# Patient Record
Sex: MALE | Race: BLACK OR AFRICAN AMERICAN | NOT HISPANIC OR LATINO | Employment: FULL TIME | URBAN - METROPOLITAN AREA
[De-identification: names, ages, dates, MRNs, and addresses within clinical notes are randomized per-mention and may not be internally consistent; named-entity substitution may affect disease eponyms.]

---

## 2023-04-27 ENCOUNTER — OFFICE VISIT (OUTPATIENT)
Dept: FAMILY MEDICINE CLINIC | Facility: CLINIC | Age: 38
End: 2023-04-27
Payer: MEDICAID

## 2023-04-27 VITALS
SYSTOLIC BLOOD PRESSURE: 176 MMHG | HEART RATE: 98 BPM | BODY MASS INDEX: 33.04 KG/M2 | HEIGHT: 68 IN | TEMPERATURE: 97.7 F | WEIGHT: 218 LBS | DIASTOLIC BLOOD PRESSURE: 102 MMHG | OXYGEN SATURATION: 98 %

## 2023-04-27 DIAGNOSIS — Z00.00 ROUTINE GENERAL MEDICAL EXAMINATION AT A HEALTH CARE FACILITY: Primary | ICD-10-CM

## 2023-04-27 DIAGNOSIS — R06.83 SNORING: ICD-10-CM

## 2023-04-27 DIAGNOSIS — R03.0 ELEVATED BLOOD PRESSURE READING IN OFFICE WITHOUT DIAGNOSIS OF HYPERTENSION: ICD-10-CM

## 2023-04-27 DIAGNOSIS — R94.31 ABNORMAL EKG: ICD-10-CM

## 2023-04-27 RX ORDER — DIPHENOXYLATE HYDROCHLORIDE AND ATROPINE SULFATE 2.5; .025 MG/1; MG/1
TABLET ORAL DAILY
COMMUNITY

## 2023-04-27 RX ORDER — HYDROCHLOROTHIAZIDE 25 MG/1
25 TABLET ORAL DAILY
Qty: 30 TABLET | Refills: 1 | Status: SHIPPED | OUTPATIENT
Start: 2023-04-27

## 2023-04-27 NOTE — PROGRESS NOTES
"Chief Complaint  Establish Care (New pt, Physical )    Subjective        John Cooper presents to [facility]  History of Present Illness patient here to establish care.  He has not had a PCP recently goes to health clinics whenever he has time or needed.  Considers himself overall healthy.    He takes no medicine.  Prefers naturopathic over prescription medications.    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  This is a very busy time of year for him.  Has current female partner.  He also has 5 daughters aging and range from 2-14.  His partner has 1 dog.    Patient has significant allergies especially this time of year.  Does not like to take allergy medicine.  He has had a weird appetite recently.  Some days hungry some days not so hungry.  He has no nausea, vomiting, belly pain.  No reflux.  He denies headaches, dizziness.  He has no chest pain, palpitation, dyspnea, cough.  No melena or hematuria.  No urinary symptoms.  No myalgia, arthralgia.  No weakness.    He does have a family history of hypertension and reports \"lots of cancers\".  Grandmother  of colon cancer, dad with polyps.  No known family history of prostate cancer.  He reports his brother  of bone cancer.    Patient denies recreational drugs or tobacco.  He is a social drinker, not frequently.        Objective   Vital Signs:  BP (!) 176/102   Pulse 98   Temp 97.7 °F (36.5 °C)   Ht 172.7 cm (68\")   Wt 98.9 kg (218 lb)   SpO2 98%   BMI 33.15 kg/m²   Estimated body mass index is 33.15 kg/m² as calculated from the following:    Height as of this encounter: 172.7 cm (68\").    Weight as of this encounter: 98.9 kg (218 lb).       BMI is >= 30 and <35. (Class 1 Obesity). The following options were offered after discussion;: weight loss educational material (shared in after visit summary)      Physical Exam  Vitals and nursing note " reviewed.   Constitutional:       General: He is not in acute distress.     Appearance: He is well-developed. He is not ill-appearing.   HENT:      Head: Normocephalic and atraumatic.      Right Ear: Ear canal and external ear normal. A middle ear effusion is present. Tympanic membrane is not erythematous.      Left Ear: Ear canal and external ear normal. A middle ear effusion is present. Tympanic membrane is not erythematous.      Mouth/Throat:      Mouth: Mucous membranes are moist.      Pharynx: Oropharynx is clear. Uvula midline.      Comments: Appears to have narrow oropharynx  Eyes:      General:         Right eye: No discharge.         Left eye: No discharge.      Conjunctiva/sclera: Conjunctivae normal.      Pupils: Pupils are equal, round, and reactive to light.   Neck:      Thyroid: No thyromegaly.   Cardiovascular:      Rate and Rhythm: Normal rate and regular rhythm.      Heart sounds: Normal heart sounds. No murmur heard.  Pulmonary:      Effort: Pulmonary effort is normal.      Breath sounds: Normal breath sounds.   Abdominal:      General: Bowel sounds are normal.      Palpations: Abdomen is soft.      Tenderness: There is no abdominal tenderness.   Musculoskeletal:         General: No deformity.      Cervical back: Neck supple.      Comments: Gait smooth and steady   Lymphadenopathy:      Cervical: No cervical adenopathy.   Skin:     General: Skin is warm and dry.   Neurological:      General: No focal deficit present.      Mental Status: He is alert and oriented to person, place, and time.   Psychiatric:         Mood and Affect: Mood normal.         Behavior: Behavior normal.         Thought Content: Thought content normal.      Comments: Pleasant,conversant        Result Review :              ECG 12 Lead    Date/Time: 4/27/2023 2:59 PM  Performed by: Nancy Mendez APRN  Authorized by: Nancy Mendez APRN   Comparison: not compared with previous ECG   Previous ECG: no previous ECG  available  Rhythm: sinus rhythm and sinus arrhythmia  Rate: normal  Conduction: conduction normal  Q waves: V1 and V2    QRS axis: normal    Clinical impression: abnormal EKG  Comments: EKG faxed to [facility] and reviewed by Dr Ga-pt feeling fine-did not appear to be acute and pt has been scheduled with cardiology as work in with Dr Ga next week-              Assessment and Plan   Diagnoses and all orders for this visit:    1. Routine general medical examination at a health care facility (Primary)  -     CBC & Differential  -     Comprehensive Metabolic Panel  -     Hemoglobin A1c  -     Lipid Panel With LDL / HDL Ratio  -     Microalbumin / Creatinine Urine Ratio - Urine, Clean Catch  -     TSH Rfx On Abnormal To Free T4  -     Hepatitis C Antibody    2. Elevated blood pressure reading in office without diagnosis of hypertension  -     ECG 12 Lead  -     SCANNED EKG  -     hydroCHLOROthiazide (HYDRODIURIL) 25 MG tablet; Take 1 tablet by mouth Daily.  Dispense: 30 tablet; Refill: 1    3. Snoring  -     Ambulatory Referral to Sleep Medicine    4. Abnormal EKG  -     Ambulatory Referral [facility]  -     SCANNED EKG    Appropriate health maintenance and prevention topics specific for this patient were discussed today.  Additionally, health goals, and health concerns addressed as appropriate.  Pt was encouraged to stay up to date on recommended screenings and vaccines based on USPSTF guidelines.     EKG done today and was abnormal.  Did not need to go to [facility] and will see cardiology next week after EKG evaluated by cardiology.  However, he has been instructed on s/s that require emergency eval.     Pt is resistant to medications and prefers alternatives when possible.  He is willing to go for sleep study.      After discussion, he is willing to start HCTZ-side effects reviewed.  After labs, may add on ARB, amlodipine for better control.  My concern is that if given too many meds at once he  will not take any.  He understands need for BP control.  If we are able to get him off meds at some point, more than willing to do so.     Recommend netti pot for chronic allergy control-prefers to avoid meds.  Cautioned to use distilled water.     I will see him back week after cardiology for eval and BP check.        Follow Up   No follow-ups on file.  Patient was given instructions and counseling regarding his condition or for health maintenance advice. Please see specific information pulled into the AVS if appropriate.

## 2023-04-28 LAB
ALBUMIN SERPL-MCNC: 4.6 G/DL (ref 3.5–5.2)
ALBUMIN/CREAT UR: 232 MG/G CREAT (ref 0–29)
ALBUMIN/GLOB SERPL: 1.5 G/DL
ALP SERPL-CCNC: 81 U/L (ref 39–117)
ALT SERPL-CCNC: 26 U/L (ref 1–41)
AST SERPL-CCNC: 22 U/L (ref 1–40)
BASOPHILS # BLD AUTO: 0.05 10*3/MM3 (ref 0–0.2)
BASOPHILS NFR BLD AUTO: 0.9 % (ref 0–1.5)
BILIRUB SERPL-MCNC: 0.4 MG/DL (ref 0–1.2)
BUN SERPL-MCNC: 14 MG/DL (ref 6–20)
BUN/CREAT SERPL: 12.6 (ref 7–25)
CALCIUM SERPL-MCNC: 9.6 MG/DL (ref 8.6–10.5)
CHLORIDE SERPL-SCNC: 102 MMOL/L (ref 98–107)
CHOLEST SERPL-MCNC: 257 MG/DL (ref 0–200)
CO2 SERPL-SCNC: 28.1 MMOL/L (ref 22–29)
CREAT SERPL-MCNC: 1.11 MG/DL (ref 0.76–1.27)
CREAT UR-MCNC: 129.5 MG/DL
EGFRCR SERPLBLD CKD-EPI 2021: 86.6 ML/MIN/1.73
EOSINOPHIL # BLD AUTO: 0.17 10*3/MM3 (ref 0–0.4)
EOSINOPHIL NFR BLD AUTO: 3.2 % (ref 0.3–6.2)
ERYTHROCYTE [DISTWIDTH] IN BLOOD BY AUTOMATED COUNT: 13.7 % (ref 12.3–15.4)
GLOBULIN SER CALC-MCNC: 3.1 GM/DL
GLUCOSE SERPL-MCNC: 91 MG/DL (ref 65–99)
HBA1C MFR BLD: 5.1 % (ref 4.8–5.6)
HCT VFR BLD AUTO: 42 % (ref 37.5–51)
HCV IGG SERPL QL IA: NON REACTIVE
HDLC SERPL-MCNC: 52 MG/DL (ref 40–60)
HGB BLD-MCNC: 14.5 G/DL (ref 13–17.7)
IMM GRANULOCYTES # BLD AUTO: 0.01 10*3/MM3 (ref 0–0.05)
IMM GRANULOCYTES NFR BLD AUTO: 0.2 % (ref 0–0.5)
LDLC SERPL CALC-MCNC: 162 MG/DL (ref 0–100)
LDLC/HDLC SERPL: 3.04 {RATIO}
LYMPHOCYTES # BLD AUTO: 2.13 10*3/MM3 (ref 0.7–3.1)
LYMPHOCYTES NFR BLD AUTO: 39.8 % (ref 19.6–45.3)
MCH RBC QN AUTO: 29.2 PG (ref 26.6–33)
MCHC RBC AUTO-ENTMCNC: 34.5 G/DL (ref 31.5–35.7)
MCV RBC AUTO: 84.5 FL (ref 79–97)
MICROALBUMIN UR-MCNC: 300 UG/ML
MONOCYTES # BLD AUTO: 0.48 10*3/MM3 (ref 0.1–0.9)
MONOCYTES NFR BLD AUTO: 9 % (ref 5–12)
NEUTROPHILS # BLD AUTO: 2.51 10*3/MM3 (ref 1.7–7)
NEUTROPHILS NFR BLD AUTO: 46.9 % (ref 42.7–76)
NRBC BLD AUTO-RTO: 0 /100 WBC (ref 0–0.2)
PLATELET # BLD AUTO: 249 10*3/MM3 (ref 140–450)
POTASSIUM SERPL-SCNC: 4 MMOL/L (ref 3.5–5.2)
PROT SERPL-MCNC: 7.7 G/DL (ref 6–8.5)
RBC # BLD AUTO: 4.97 10*6/MM3 (ref 4.14–5.8)
SODIUM SERPL-SCNC: 138 MMOL/L (ref 136–145)
TRIGL SERPL-MCNC: 234 MG/DL (ref 0–150)
TSH SERPL DL<=0.005 MIU/L-ACNC: 1.45 UIU/ML (ref 0.27–4.2)
VLDLC SERPL CALC-MCNC: 43 MG/DL (ref 5–40)
WBC # BLD AUTO: 5.35 10*3/MM3 (ref 3.4–10.8)

## 2023-04-28 RX ORDER — PRAVASTATIN SODIUM 40 MG
40 TABLET ORAL DAILY
Qty: 30 TABLET | Refills: 1 | Status: SHIPPED | OUTPATIENT
Start: 2023-04-28

## 2023-04-28 RX ORDER — OLMESARTAN MEDOXOMIL 20 MG/1
20 TABLET ORAL DAILY
Qty: 30 TABLET | Refills: 1 | Status: SHIPPED | OUTPATIENT
Start: 2023-04-28

## 2023-05-16 ENCOUNTER — OFFICE VISIT (OUTPATIENT)
Dept: CARDIOLOGY | Facility: CLINIC | Age: 38
End: 2023-05-16
Payer: MEDICAID

## 2023-05-16 VITALS
SYSTOLIC BLOOD PRESSURE: 140 MMHG | WEIGHT: 219 LBS | BODY MASS INDEX: 33.19 KG/M2 | DIASTOLIC BLOOD PRESSURE: 84 MMHG | HEIGHT: 68 IN | HEART RATE: 81 BPM

## 2023-05-16 DIAGNOSIS — E66.09 CLASS 1 OBESITY DUE TO EXCESS CALORIES WITHOUT SERIOUS COMORBIDITY WITH BODY MASS INDEX (BMI) OF 33.0 TO 33.9 IN ADULT: ICD-10-CM

## 2023-05-16 DIAGNOSIS — E78.2 MIXED HYPERLIPIDEMIA: ICD-10-CM

## 2023-05-16 DIAGNOSIS — R94.31 ABNORMAL ECG: Primary | ICD-10-CM

## 2023-05-16 DIAGNOSIS — I10 PRIMARY HYPERTENSION: ICD-10-CM

## 2023-05-16 PROBLEM — E66.811 CLASS 1 OBESITY DUE TO EXCESS CALORIES WITHOUT SERIOUS COMORBIDITY WITH BODY MASS INDEX (BMI) OF 33.0 TO 33.9 IN ADULT: Status: ACTIVE | Noted: 2023-05-16

## 2023-05-16 PROCEDURE — 3079F DIAST BP 80-89 MM HG: CPT | Performed by: INTERNAL MEDICINE

## 2023-05-16 PROCEDURE — 3077F SYST BP >= 140 MM HG: CPT | Performed by: INTERNAL MEDICINE

## 2023-05-16 PROCEDURE — 1159F MED LIST DOCD IN RCRD: CPT | Performed by: INTERNAL MEDICINE

## 2023-05-16 PROCEDURE — 99204 OFFICE O/P NEW MOD 45 MIN: CPT | Performed by: INTERNAL MEDICINE

## 2023-05-16 PROCEDURE — 1160F RVW MEDS BY RX/DR IN RCRD: CPT | Performed by: INTERNAL MEDICINE

## 2023-05-16 PROCEDURE — 93000 ELECTROCARDIOGRAM COMPLETE: CPT | Performed by: INTERNAL MEDICINE

## 2023-05-16 RX ORDER — ROSUVASTATIN CALCIUM 40 MG/1
20 TABLET, COATED ORAL NIGHTLY
Qty: 90 TABLET | Refills: 3 | Status: SHIPPED | OUTPATIENT
Start: 2023-05-16

## 2023-05-16 RX ORDER — OLMESARTAN MEDOXOMIL 40 MG/1
40 TABLET ORAL DAILY
Qty: 90 TABLET | Refills: 3 | Status: SHIPPED | OUTPATIENT
Start: 2023-05-16

## 2023-05-16 NOTE — PROGRESS NOTES
Date of Office Visit: 2023  Encounter Provider: Aleida Ga MD  Place of Service: [facility] CARDIOLOGY  Patient Name: John Cooper  :1985      Patient ID:  John Cooper is a 38 y.o. male is here for abnormal ECG.           History of Present Illness    He is here for abnormal ECG.  He has a history of hypertension, obesity and hyperlipidemia.    There is a family history of hypertension, cancer and nonischemic cardiomyopathy.  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    Labs on 2023 show normal CMP with potassium 4.0, hemoglobin A1c 5.1%, TSH 1.45, total cholesterol 257, HDL 52, , triglycerides 234, VLDL 43, normal CBC.    He snores heavily.  He was having a lot of fatigue and daytime somnolence.  He saw his PCP and was noted to have an abnormal ECG and very high blood pressure.  Blood pressure medication was initiated as well as medication for hyperlipidemia.  He does feel better on this.  His energy level is improved.  He does not feels heart racing or skipping.  He has a blood pressure cuff at home but he does not check his blood pressure.  He has no orthopnea or PND.  He has no tachycardia, dizziness or syncope.  He has no exertional chest tightness or pressure and no exertional dyspnea.    Past Medical History:   Diagnosis Date   • Allergic          Past Surgical History:   Procedure Laterality Date   • ANKLE SURGERY Right 2015   • MANDIBLE SURGERY Right 2019       Current Outpatient Medications on File Prior to Visit   Medication Sig Dispense Refill   • BLACK CURRANT SEED OIL PO Take  by mouth.     • hydroCHLOROthiazide (HYDRODIURIL) 25 MG tablet Take 1 tablet by mouth Daily. 30 tablet 1   • multivitamin (THERAGRAN) tablet tablet Take  by mouth Daily.     • [DISCONTINUED] olmesartan  "(Benicar) 20 MG tablet Take 1 tablet by mouth Daily. 30 tablet 1   • [DISCONTINUED] pravastatin (Pravachol) 40 MG tablet Take 1 tablet by mouth Daily. 30 tablet 1     No current facility-administered medications on file prior to visit.       Social History     Socioeconomic History   • Marital status: Single   Tobacco Use   • Smoking status: Unknown   • Smokeless tobacco: Never   Vaping Use   • Vaping Use: Never used   Substance and Sexual Activity   • Alcohol use: Yes     Comment: social   • Drug use: Not Currently           ROS    Procedures    ECG 12 Lead    Date/Time: 5/16/2023 9:09 AM  Performed by: Aleida Ga MD  Authorized by: Aleida Ga MD   Comparison: compared with previous ECG   Similar to previous ECG  Rhythm: sinus rhythm  Q waves: V1, V2 and V3    T inversion: V6 and aVF  Other findings: left ventricular hypertrophy    Clinical impression: abnormal EKG                Objective:      Vitals:    05/16/23 0855   BP: 140/84   Pulse: 81   Weight: 99.3 kg (219 lb)   Height: 172.7 cm (68\")     Body mass index is 33.3 kg/m².    Vitals reviewed.   Constitutional:       General: Not in acute distress.     Appearance: Well-developed. Obese. Not diaphoretic.   Eyes:      General: No scleral icterus.     Conjunctiva/sclera: Conjunctivae normal.   HENT:      Head: Normocephalic and atraumatic.   Neck:      Thyroid: No thyromegaly.      Vascular: No carotid bruit or JVD.      Lymphadenopathy: No cervical adenopathy.   Pulmonary:      Effort: Pulmonary effort is normal. No respiratory distress.      Breath sounds: Normal breath sounds. No wheezing. No rhonchi. No rales.   Chest:      Chest wall: Not tender to palpatation.   Cardiovascular:      Normal rate. Regular rhythm.      Murmurs: There is no murmur.      No gallop.   Pulses:     Intact distal pulses.   Edema:     Peripheral edema absent.   Abdominal:      General: Bowel sounds are normal. There is no distension or abdominal bruit.      " Palpations: Abdomen is soft. There is no abdominal mass.      Tenderness: There is no abdominal tenderness.   Musculoskeletal:         General: No deformity.      Extremities: No clubbing present.     Cervical back: Neck supple. Skin:     General: Skin is warm and dry. There is no cyanosis.      Coloration: Skin is not pale.      Findings: No rash.   Neurological:      Mental Status: Alert and oriented to person, place, and time.      Cranial Nerves: No cranial nerve deficit.   Psychiatric:         Judgment: Judgment normal.         Lab Review:       Assessment:      Diagnosis Plan   1. Abnormal ECG  Adult Transthoracic Echo Complete W/ Cont if Necessary Per Protocol    Treadmill Stress Test      2. Primary hypertension  Adult Transthoracic Echo Complete W/ Cont if Necessary Per Protocol    Treadmill Stress Test      3. Mixed hyperlipidemia  Adult Transthoracic Echo Complete W/ Cont if Necessary Per Protocol    Treadmill Stress Test      4. Class 1 obesity due to excess calories without serious comorbidity with body mass index (BMI) of 33.0 to 33.9 in adult          1. Hypertension, BP goal <120/80.   2. Hyperlipidemia, change pravastatin to rosuvastatin given his level of hyperlipidemia  3. Abnormal ECG, set up treadmill stress study and echocardiogram  4. Overweight  5. Possible sleep apnea  6. Family history of cardiomyopathy     Plan:       See Linda in 6 weeks, increase olmesartan to 40 mg daily to reach blood pressure goal- maintain HCTZ.  Change pravastatin to rosuvastatin since he has significant hyperlipidemia.  Set up testing for abnormal ECG and hypertension.

## 2023-06-06 ENCOUNTER — HOSPITAL ENCOUNTER (OUTPATIENT)
Dept: CARDIOLOGY | Facility: HOSPITAL | Age: 38
Discharge: HOME | End: 2023-06-06
Payer: MEDICAID

## 2023-06-06 VITALS
DIASTOLIC BLOOD PRESSURE: 90 MMHG | WEIGHT: 219 LBS | HEART RATE: 76 BPM | BODY MASS INDEX: 33.19 KG/M2 | SYSTOLIC BLOOD PRESSURE: 180 MMHG | HEIGHT: 68 IN

## 2023-06-06 DIAGNOSIS — E78.2 MIXED HYPERLIPIDEMIA: ICD-10-CM

## 2023-06-06 DIAGNOSIS — I10 PRIMARY HYPERTENSION: ICD-10-CM

## 2023-06-06 DIAGNOSIS — R06.09 DYSPNEA ON EXERTION: ICD-10-CM

## 2023-06-06 DIAGNOSIS — I10 PRIMARY HYPERTENSION: Primary | ICD-10-CM

## 2023-06-06 DIAGNOSIS — R94.31 ABNORMAL ECG: ICD-10-CM

## 2023-06-06 LAB
AORTIC ARCH: 2.4 CM
ASCENDING AORTA: 3.3 CM
BH CV ECHO LEFT VENTRICLE GLOBAL LONGITUDINAL STRAIN: -9.9 %
BH CV ECHO MEAS - ACS: 2.12 CM
BH CV ECHO MEAS - AO MAX PG: 8.4 MMHG
BH CV ECHO MEAS - AO MEAN PG: 5 MMHG
BH CV ECHO MEAS - AO ROOT DIAM: 3.5 CM
BH CV ECHO MEAS - AO V2 MAX: 145 CM/SEC
BH CV ECHO MEAS - AO V2 VTI: 25.8 CM
BH CV ECHO MEAS - AVA(I,D): 2.3 CM2
BH CV ECHO MEAS - EDV(CUBED): 166.4 ML
BH CV ECHO MEAS - EDV(MOD-SP2): 117 ML
BH CV ECHO MEAS - EDV(MOD-SP4): 142 ML
BH CV ECHO MEAS - EF(MOD-BP): 37.1 %
BH CV ECHO MEAS - EF(MOD-SP2): 31.6 %
BH CV ECHO MEAS - EF(MOD-SP4): 41.5 %
BH CV ECHO MEAS - ESV(CUBED): 75.2 ML
BH CV ECHO MEAS - ESV(MOD-SP2): 80 ML
BH CV ECHO MEAS - ESV(MOD-SP4): 83 ML
BH CV ECHO MEAS - FS: 23.2 %
BH CV ECHO MEAS - IVS/LVPW: 1 CM
BH CV ECHO MEAS - IVSD: 1.3 CM
BH CV ECHO MEAS - LAT PEAK E' VEL: 9.1 CM/SEC
BH CV ECHO MEAS - LV DIASTOLIC VOL/BSA (35-75): 66.8 CM2
BH CV ECHO MEAS - LV MASS(C)D: 304.3 GRAMS
BH CV ECHO MEAS - LV MAX PG: 2.5 MMHG
BH CV ECHO MEAS - LV MEAN PG: 1 MMHG
BH CV ECHO MEAS - LV SYSTOLIC VOL/BSA (12-30): 39.1 CM2
BH CV ECHO MEAS - LV V1 MAX: 79.7 CM/SEC
BH CV ECHO MEAS - LV V1 VTI: 14.2 CM
BH CV ECHO MEAS - LVIDD: 5.5 CM
BH CV ECHO MEAS - LVIDS: 4.2 CM
BH CV ECHO MEAS - LVOT AREA: 4.2 CM2
BH CV ECHO MEAS - LVOT DIAM: 2.31 CM
BH CV ECHO MEAS - LVPWD: 1.3 CM
BH CV ECHO MEAS - MED PEAK E' VEL: 7.3 CM/SEC
BH CV ECHO MEAS - MV A DUR: 0.1 SEC
BH CV ECHO MEAS - MV A MAX VEL: 62.6 CM/SEC
BH CV ECHO MEAS - MV DEC SLOPE: 293.4 CM/SEC2
BH CV ECHO MEAS - MV DEC TIME: 0.15 MSEC
BH CV ECHO MEAS - MV E MAX VEL: 67.3 CM/SEC
BH CV ECHO MEAS - MV E/A: 1.08
BH CV ECHO MEAS - MV MAX PG: 2.6 MMHG
BH CV ECHO MEAS - MV MEAN PG: 1.22 MMHG
BH CV ECHO MEAS - MV P1/2T: 81.8 MSEC
BH CV ECHO MEAS - MV V2 VTI: 24.4 CM
BH CV ECHO MEAS - MVA(P1/2T): 2.7 CM2
BH CV ECHO MEAS - MVA(VTI): 2.43 CM2
BH CV ECHO MEAS - PA ACC TIME: 0.12 SEC
BH CV ECHO MEAS - PA PR(ACCEL): 24.3 MMHG
BH CV ECHO MEAS - PA V2 MAX: 79.4 CM/SEC
BH CV ECHO MEAS - PULM A REVS DUR: 0.1 SEC
BH CV ECHO MEAS - PULM A REVS VEL: 25.3 CM/SEC
BH CV ECHO MEAS - PULM DIAS VEL: 70.4 CM/SEC
BH CV ECHO MEAS - PULM S/D: 0.53
BH CV ECHO MEAS - PULM SYS VEL: 37 CM/SEC
BH CV ECHO MEAS - QP/QS: 0.55
BH CV ECHO MEAS - RAP SYSTOLE: 3 MMHG
BH CV ECHO MEAS - RV MAX PG: 1.48 MMHG
BH CV ECHO MEAS - RV V1 MAX: 60.8 CM/SEC
BH CV ECHO MEAS - RV V1 VTI: 12 CM
BH CV ECHO MEAS - RVOT DIAM: 1.87 CM
BH CV ECHO MEAS - RVSP: 19 MMHG
BH CV ECHO MEAS - SI(MOD-SP2): 17.4 ML/M2
BH CV ECHO MEAS - SI(MOD-SP4): 27.8 ML/M2
BH CV ECHO MEAS - SUP REN AO DIAM: 1.8 CM
BH CV ECHO MEAS - SV(LVOT): 59.3 ML
BH CV ECHO MEAS - SV(MOD-SP2): 37 ML
BH CV ECHO MEAS - SV(MOD-SP4): 59 ML
BH CV ECHO MEAS - SV(RVOT): 32.9 ML
BH CV ECHO MEAS - TAPSE (>1.6): 2.03 CM
BH CV ECHO MEAS - TR MAX PG: 15.8 MMHG
BH CV ECHO MEAS - TR MAX VEL: 198.9 CM/SEC
BH CV ECHO MEASUREMENTS AVERAGE E/E' RATIO: 8.21
BH CV XLRA - RV BASE: 3.4 CM
BH CV XLRA - RV MID: 2.8 CM
BH CV XLRA - TDI S': 17.7 CM/SEC
LEFT ATRIUM VOLUME INDEX: 24 ML/M2
SINUS: 3.3 CM
STJ: 2.8 CM

## 2023-06-06 PROCEDURE — 93306 TTE W/DOPPLER COMPLETE: CPT | Performed by: INTERNAL MEDICINE

## 2023-06-06 PROCEDURE — 93356 MYOCRD STRAIN IMG SPCKL TRCK: CPT

## 2023-06-06 PROCEDURE — 93356 MYOCRD STRAIN IMG SPCKL TRCK: CPT | Performed by: INTERNAL MEDICINE

## 2023-06-06 PROCEDURE — 93306 TTE W/DOPPLER COMPLETE: CPT

## 2023-06-07 ENCOUNTER — TELEPHONE (OUTPATIENT)
Dept: CARDIOLOGY | Facility: CLINIC | Age: 38
End: 2023-06-07
Payer: MEDICAID

## 2023-06-07 RX ORDER — AMLODIPINE BESYLATE 5 MG/1
5 TABLET ORAL NIGHTLY
Qty: 90 TABLET | Refills: 3 | Status: SHIPPED | OUTPATIENT
Start: 2023-06-07

## 2023-06-07 NOTE — TELEPHONE ENCOUNTER
Set up stress nuclear, make sure that I ordered this- start amlodipine 5mg nightly.      May need cardiac mri  May need genetic testing for CMP and FH.   May need renin/kyrie - 24 urine for pheo for htn    Does he have appt with leonarda 6 wks after my last visit?    I called and talked with him about all of this.    RM

## 2023-06-08 ENCOUNTER — TELEPHONE (OUTPATIENT)
Dept: CARDIOLOGY | Facility: CLINIC | Age: 38
End: 2023-06-08
Payer: MEDICAID

## 2023-06-08 NOTE — TELEPHONE ENCOUNTER
06.08 PATIENT IS SCHEDULED ----- Message from MINESH Acosta sent at 6/7/2023 11:01 AM EDT -----  Please get him scheduled to see me the week of 7/17. OK to use any appointment at the main office. I also have openings at [facility] and [place].     Thanks!  MINESH Jessica

## 2023-07-18 PROBLEM — I50.21 ACUTE HFREF (HEART FAILURE WITH REDUCED EJECTION FRACTION): Status: ACTIVE | Noted: 2023-07-18

## 2023-07-18 PROBLEM — I42.0 DILATED CARDIOMYOPATHY: Status: ACTIVE | Noted: 2023-07-18

## 2023-07-20 ENCOUNTER — TELEPHONE (OUTPATIENT)
Dept: FAMILY MEDICINE CLINIC | Facility: CLINIC | Age: 38
End: 2023-07-20

## 2023-07-20 NOTE — TELEPHONE ENCOUNTER
Caller: John Cooper    Relationship to patient: Self    Best call back number: [phone]     Patient is needing: PATIENT STATES THAT HE WOULD LIKE A CALLBACK WITH STATUS ON HIS HERNIA PROCEDURE.    PLEASE ADVISE.

## 2023-07-27 ENCOUNTER — TELEPHONE (OUTPATIENT)
Dept: CARDIOLOGY | Facility: CLINIC | Age: 38
End: 2023-07-27

## 2023-07-27 ENCOUNTER — OFFICE VISIT (OUTPATIENT)
Dept: SURGERY | Facility: CLINIC | Age: 38
End: 2023-07-27
Payer: MEDICAID

## 2023-07-27 ENCOUNTER — PREP FOR SURGERY (OUTPATIENT)
Dept: OTHER | Facility: HOSPITAL | Age: 38
End: 2023-07-27
Payer: MEDICAID

## 2023-07-27 VITALS — BODY MASS INDEX: 33.77 KG/M2 | WEIGHT: 222.8 LBS | HEIGHT: 68 IN

## 2023-07-27 DIAGNOSIS — K43.6 INCARCERATED EPIGASTRIC HERNIA: Primary | ICD-10-CM

## 2023-07-27 RX ORDER — CEFAZOLIN SODIUM 2 G/100ML
2000 INJECTION, SOLUTION INTRAVENOUS ONCE
OUTPATIENT
Start: 2023-07-27 | End: 2023-07-27

## 2023-07-27 NOTE — TELEPHONE ENCOUNTER
Caller: John Cooper    Relationship: Self    Best call back number: [phone]    What is the best time to reach you: ANYTIME    Who are you requesting to speak with (clinical staff, provider,  specific staff member):     Do you know the name of the person who called: NOA    What was the call regarding: [facility] CALLED TO SEE IF PT CAN BE SEEN SOONER THAN 08/29/2023. HE NEEDS A CARDIAC CLEARANCE FOR HERNIA SURGERY.  HE IS AT INCREASED RISK OF INCARCERATION . PLEASE CALL PT WITH APPT.  IF YOU HAVE ANY QUESTIONS YOU CAN CALL [email or web address]    Is it okay if the provider responds through MyChart: NO

## 2023-07-28 NOTE — PROGRESS NOTES
General Surgery H&P/Consultation    Impression/Plan:    Mr. John Cooper is a 38 y.o. with a chronically incarcerated epigastric hernia.  He had an episode of worsening incarceration versus strangulation which has since resolved.  I recommended proceeding with open epigastric hernia repair.  He is scheduled to see cardiology for reduced ejection fraction heart failure.  We will attempt to move this appointment up to have him evaluated to determine if any optimization is necessary prior to proceeding with epigastric hernia repair.  The risk, benefits, and alternatives of the procedure were discussed with him and he is in agreement with proceeding.  We discussed the utilization of mesh if the defect is over 2 cm that time of the operation.  He expressed understanding.  We also discussed the risk of mesh complications should it be required.  He was counseled on signs and symptoms of worsening incarceration and strangulation and encouraged to present to the emergency room for evaluation should these occur.    Referring Provider: Omar Dalton MD    Chief Complaint:    Abdominal pain, hernia    History of Present Illness:    Mr. John Cooper is a 38 y.o. gentleman presenting for evaluation of a an abdominal wall hernia.  He reports that he has had a protrusion in his upper abdomen which acutely worsened last week.  It became more swollen and he was evaluated by Dr. Galo and referred to general surgery for the hernia.  He reports that the area softened up and returned to its regular size spontaneously.  He denies any emesis.    Past Medical History:   Past Medical History:   Diagnosis Date    Allergic           Past Surgical History:    Past Surgical History:   Procedure Laterality Date    ANKLE SURGERY Right 2015    MANDIBLE SURGERY Right 2019         Family History:    Family History   Problem Relation Age of Onset    Hypertension Father     Bone cancer Brother     Colon cancer Maternal Grandmother          Social  History:    Social History     Socioeconomic History    Marital status: Single   Tobacco Use    Smoking status: Some Days     Types: Cigars    Smokeless tobacco: Never    Tobacco comments:     Social cigar smoker   Vaping Use    Vaping Use: Never used   Substance and Sexual Activity    Alcohol use: Yes     Comment: social    Drug use: Not Currently    Sexual activity: Defer         Allergies:   Allergies   Allergen Reactions    Other Swelling     BANANAS, STATES LIPS BEGAN TO SWELL AND TINGLE       Medications:     Current Outpatient Medications:     amLODIPine (NORVASC) 5 MG tablet, Take 1 tablet by mouth Every Night., Disp: 90 tablet, Rfl: 3    BLACK CURRANT SEED OIL PO, Take  by mouth., Disp: , Rfl:     empagliflozin (JARDIANCE) 10 MG tablet tablet, Take 1 tablet by mouth Daily., Disp: 30 tablet, Rfl: 11    hydroCHLOROthiazide (HYDRODIURIL) 12.5 MG tablet, Take 2 tablets by mouth Daily., Disp: 30 tablet, Rfl: 11    rosuvastatin (CRESTOR) 40 MG tablet, Take 0.5 tablets by mouth Every Night., Disp: 90 tablet, Rfl: 3    sacubitril-valsartan (Entresto) 49-51 MG tablet, Take 1 tablet by mouth 2 (Two) Times a Day., Disp: 60 tablet, Rfl: 11    Radiology/Endoscopy:    No relevant radiology to review related to the hernia  Recent nuclear stress test with reduced ejection fraction    Physical Exam:   Constitutional: Well-developed well-nourished, no acute distress  Eyes: Conjunctiva normal, sclera nonicteric  ENMT: Hearing grossly normal, oral mucosa moist  Neck: Supple, trachea midline  Respiratory: No increased work of breathing, Symmetric excursion  Cardiovascular: Well pefursed, no jugular venous distention evident   Gastrointestinal: Soft, mild tenderness overlying incarcerated epigastric hernia, no overlying skin changes  Skin:  Warm, dry, no rash on visualized skin surfaces  Musculoskeletal: Symmetric strength, normal gait  Psychiatric: Alert and oriented ×3, normal affect       Davy Beebe MD  General and  Endoscopic Surgery  [facility]    [address], [address]  [place], [place], [address]  P: [phone]  F: [phone]

## 2023-08-08 ENCOUNTER — OFFICE VISIT (OUTPATIENT)
Dept: CARDIOLOGY | Facility: CLINIC | Age: 38
End: 2023-08-08
Payer: MEDICAID

## 2023-08-08 VITALS
WEIGHT: 217.4 LBS | HEIGHT: 68 IN | HEART RATE: 53 BPM | DIASTOLIC BLOOD PRESSURE: 118 MMHG | SYSTOLIC BLOOD PRESSURE: 168 MMHG | OXYGEN SATURATION: 99 % | BODY MASS INDEX: 32.95 KG/M2

## 2023-08-08 DIAGNOSIS — I50.21 ACUTE HFREF (HEART FAILURE WITH REDUCED EJECTION FRACTION): ICD-10-CM

## 2023-08-08 DIAGNOSIS — I10 PRIMARY HYPERTENSION: ICD-10-CM

## 2023-08-08 DIAGNOSIS — I42.0 DILATED CARDIOMYOPATHY: ICD-10-CM

## 2023-08-08 DIAGNOSIS — E78.2 MIXED HYPERLIPIDEMIA: ICD-10-CM

## 2023-08-08 DIAGNOSIS — R94.31 ABNORMAL ECG: Primary | ICD-10-CM

## 2023-08-08 DIAGNOSIS — E66.09 CLASS 1 OBESITY DUE TO EXCESS CALORIES WITHOUT SERIOUS COMORBIDITY WITH BODY MASS INDEX (BMI) OF 33.0 TO 33.9 IN ADULT: ICD-10-CM

## 2023-08-08 RX ORDER — SPIRONOLACTONE 25 MG/1
25 TABLET ORAL DAILY
Qty: 30 TABLET | Refills: 11 | Status: SHIPPED | OUTPATIENT
Start: 2023-08-08

## 2023-08-08 NOTE — PROGRESS NOTES
"      [facility] CARDIOLOGY  [address]  [facility] [address]  Phone: [phone]  Fax: [phone]  Patient Name: John Cooper  :1985  Age: 38 y.o.  Primary Cardiologist: Aleida Ga MD  Encounter Provider:  MINESH Acosta    History of Present Illness     John Cooper is a 38 y.o. Black male whose medical history includes hypertension, hyperlipidemia, obesity.  He is followed in our office by Dr. Ga for dilated cardiomyopathy and hypertension. I have reviewed the past medical records in preparation of today's visit.     23 Follow-up:  He is here for 2-month follow-up.  At last visit he was started on Entresto and Jardiance.  His blood pressure is quite high today but he again reports he has not had his morning medicines.  He does admit that he has trouble sticking to the medication regimen and often does not have time to check his blood pressure at home.  He is having a lot of pain and is going to require hernia repair.  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  He does not have any chest pain, dyspnea with exertion, orthopnea, palpitations, lightheadedness, or leg swelling.    Data Review     The following data was reviewed by MINESH Acosta on 23:    Vital Signs:   BP (!) 168/118 (BP Location: Left arm, Patient Position: Sitting, Cuff Size: Large Adult)   Pulse 53   Ht 172.7 cm (68\")   Wt 98.6 kg (217 lb 6.4 oz)   SpO2 99%   BMI 33.06 kg/mý       Weight:  Wt Readings from Last 3 Encounters:   23 98.6 kg (217 lb 6.4 oz)   23 101 kg (222 lb 12.8 oz)   23 98.2 kg (216 lb 9.6 oz)     Body mass index is 33.06 kg/mý.    Below is a summary of pertinent cardiology findings:  Dr. Ga also sees his father of the same name for cardiomyopathy.   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 He does not smoke, drink alcohol daily, or " use drugs.   May 2023 seen for abnormal EKG and hypertension.  Olmesartan was increased.  June 2023 echocardiogram shows EF 37%, moderately dilated LV cavity, moderate concentric LVH, normal LV diastolic function, and normal RVSP.  July 2023 pharmacological myocardial perfusion stress study showed EF 34%, abnormal LV wall motion consistent with moderate hypokinesis and global hypokinesis with dilated LV cavity, and no evidence of ischemia.    Labs:  04/27/2023:  cr 1.1, K 4.0, otherwise unremarkable CMP HgbA1c 5.1, TSH 1.450, Chol 257, HDL 52, , Trig 234, Hgb 14.5, Plt 249      ECG 12 Lead    Date/Time: 8/8/2023 9:40 AM  Performed by: Nataly Magallon APRN  Authorized by: Nataly Magallon APRN   Comparison: compared with previous ECG from 7/18/2023  Similar to previous ECG  Rhythm: sinus rhythm  Rate: normal  BPM: 87  T inversion: III, aVF, V5 and V6  Other findings: left ventricular hypertrophy        Medications     Allergies as of 08/08/2023 - Reviewed 08/08/2023   Allergen Reaction Noted    Other Swelling 02/20/2015       Current Outpatient Medications   Medication Instructions    amLODIPine (NORVASC) 5 mg, Oral, Nightly    empagliflozin (JARDIANCE) 10 mg, Oral, Daily    rosuvastatin (CRESTOR) 20 mg, Oral, Nightly    sacubitril-valsartan (Entresto) 49-51 MG tablet 1 tablet, Oral, 2 Times Daily        Past History, Review of Systems, Exam     Past Medical History:   Diagnosis Date    Allergic        Past Surgical History:   has a past surgical history that includes Mandible surgery (Right, 2019) and Ankle surgery (Right, 2015).     Social History     Socioeconomic History    Marital status: Single   Tobacco Use    Smoking status: Some Days     Types: Cigars    Smokeless tobacco: Never    Tobacco comments:     Social cigar smoker   Vaping Use    Vaping Use: Never used   Substance and Sexual Activity    Alcohol use: Yes     Comment: social    Drug use: Not Currently    Sexual activity:  Defer       Review of Systems   Cardiovascular: Negative.    Gastrointestinal:  Positive for abdominal pain.     Vitals reviewed.   Constitutional:       Appearance: Not in distress.   Eyes:      Conjunctiva/sclera: Conjunctivae normal.      Pupils: Pupils are equal, round, and reactive to light.   HENT:      Head: Normocephalic.      Nose: Nose normal.    Mouth/Throat:      Pharynx: Oropharynx is clear.   Neck:      Vascular: JVD normal.   Pulmonary:      Effort: Pulmonary effort is normal.      Breath sounds: Normal breath sounds. No wheezing. No rhonchi. No rales.   Cardiovascular:      Normal rate. Regular rhythm. Normal S1. Normal S2.       Murmurs: There is no murmur.   Pulses:     Intact distal pulses.   Edema:     Peripheral edema absent.   Abdominal:      General: Bowel sounds are normal. There is no distension.      Palpations: Abdomen is soft.      Tenderness: There is no abdominal tenderness.   Musculoskeletal: Normal range of motion.      Cervical back: Normal range of motion and neck supple. Skin:     General: Skin is warm and dry.   Neurological:      Mental Status: Alert and oriented to person, place and time.   Psychiatric:         Attention and Perception: Attention normal.         Mood and Affect: Mood normal.         Speech: Speech normal.         Behavior: Behavior is cooperative.        Assessment and Plan     Assessment:  1. Abnormal ECG    2. Dilated cardiomyopathy    3. Acute HFrEF (heart failure with reduced ejection fraction)    4. Primary hypertension    5. Mixed hyperlipidemia    6. Class 1 obesity due to excess calories without serious comorbidity with body mass index (BMI) of 33.0 to 33.9 in adult         Abnormal ECG: He was noted to have T wave abnormalities on EKG.  July 2023 pharmacological stress study showed no evidence of ischemia.  He does have deep inferior T wave inversions today which are likely due to poorly controlled blood pressure.  Stable today.  Dilated cardiomyopathy:  June 2023 echocardiogram shows EF 37%, and moderately dilated LV cavity with moderate concentric LVH.  His father also has a cardiomyopathy.  July 2023 myocardial perfusion stress study showed no evidence of ischemia but did show EF 34% and global hypokinesis with dilated LV cavity.  He is on Entresto and Jardiance.  He does not always take them as prescribed.  Acute HFrEF: Newly diagnosed cardiomyopathy with EF about 37%.  He is on Entresto and Jardiance.  Hypertension: Poorly controlled again today; he has not yet had any of his medications.  Hyperlipidemia: Lipids were elevated when checked in April 2023.  He is now on rosuvastatin.  Obesity: His weight remains stable.    Mr. Cooper is a patient of Dr. Ga's with dilated cardiomyopathy diagnosed in June 2023.  His father also has a cardiomyopathy.  We discussed this today and that he will need medication to help his heart muscle.  We also discussed that he will need to take his medication on a regular basis.    Dr. Ga recommends genetic testing; referral has been placed for this.    She also recommends cardiac MRI and I will order this after his upcoming hernia surgery.    Of asked him to try to maintain to his medication regimen so that blood pressure will be better controlled.  I am going to stop HCTZ and add 25 mg spironolactone for better BP control. I will follow up with his preop labs. I will see him back in 6 weeks for BP check. He will keep his January 2024 appointment with Dr. Ga.    Patient is considered at acceptable risk for surgery from a cardiovascular standpoint. According to the Brody's Revised Cardiac Risk Index, patient is considered low risk (0.9%) of adverse cardiovascular events occurring with moderate risk surgery.  His risk factors are nonmodifiable.    Return for 6 weeks BP check.  Orders Placed This Encounter   Procedures    ECG 12 Lead      No orders of the defined types were placed in this encounter.        Thank you  for the opportunity to participate in this patient's care.    MINESH Jessica    This office note has been dictated.

## 2023-08-16 ENCOUNTER — TELEPHONE (OUTPATIENT)
Dept: SURGERY | Facility: CLINIC | Age: 38
End: 2023-08-16
Payer: MEDICAID

## 2023-08-16 NOTE — TELEPHONE ENCOUNTER
I attempted to contact the patient to schedule his surgery with Dr. Beebe. Mr. Cooper does not have voicemail setup on his phone therefore I could not leave a message.  I verified his verbal release for alternative contacts. Voicemail box was full on [place]. I was able to leave a voicemail on Lakeshia Cooper's number and asked that she have Mr. Cooper contact our office to schedule surgery.

## 2023-11-17 ENCOUNTER — TELEPHONE (OUTPATIENT)
Dept: CARDIOLOGY | Facility: CLINIC | Age: 38
End: 2023-11-17

## 2023-11-17 NOTE — TELEPHONE ENCOUNTER
Caller: JEFF FARRELL    Relationship: Self    Best call back number: [phone]    What is the best time to reach you: ANY    Who are you requesting to speak with (clinical staff, provider,  specific staff member):ANY    What was the call regarding: LETTING  KNOW HE IS AT [facility], HE HAD A STROKE.

## 2024-01-19 ENCOUNTER — TELEPHONE (OUTPATIENT)
Dept: FAMILY MEDICINE CLINIC | Facility: CLINIC | Age: 39
End: 2024-01-19

## 2024-01-19 NOTE — TELEPHONE ENCOUNTER
Called to schedule hospital fu. No answer straight to voicemail. VM not set up to leave a message will try again later     Hub to relay

## 2024-01-19 NOTE — TELEPHONE ENCOUNTER
UNABLE TO WARM TRANSFER.     Caller: JEFF FARRELL    Relationship to patient: Mother    Best call back number: [phone]     Patient is needing:PATIENT IS NEEDING A HOSPITAL FOLLOW UP. HE WAS DISCHARGED FROM [facility] 1/18/24 HE HAD A STROKE. PLEASE CALL

## 2024-01-26 ENCOUNTER — OFFICE VISIT (OUTPATIENT)
Dept: FAMILY MEDICINE CLINIC | Facility: CLINIC | Age: 39
End: 2024-01-26
Payer: COMMERCIAL

## 2024-01-26 ENCOUNTER — TELEPHONE (OUTPATIENT)
Dept: FAMILY MEDICINE CLINIC | Facility: CLINIC | Age: 39
End: 2024-01-26

## 2024-01-26 VITALS
RESPIRATION RATE: 18 BRPM | SYSTOLIC BLOOD PRESSURE: 122 MMHG | HEIGHT: 68 IN | OXYGEN SATURATION: 98 % | TEMPERATURE: 96.6 F | DIASTOLIC BLOOD PRESSURE: 70 MMHG | WEIGHT: 187 LBS | BODY MASS INDEX: 28.34 KG/M2 | HEART RATE: 91 BPM

## 2024-01-26 DIAGNOSIS — I10 PRIMARY HYPERTENSION: ICD-10-CM

## 2024-01-26 DIAGNOSIS — Z86.73 HISTORY OF STROKE: ICD-10-CM

## 2024-01-26 DIAGNOSIS — K59.00 CONSTIPATION, UNSPECIFIED CONSTIPATION TYPE: ICD-10-CM

## 2024-01-26 DIAGNOSIS — Z74.09 LIMITED MOBILITY: ICD-10-CM

## 2024-01-26 DIAGNOSIS — Z09 HOSPITAL DISCHARGE FOLLOW-UP: Primary | ICD-10-CM

## 2024-01-26 RX ORDER — GABAPENTIN 100 MG/1
100 CAPSULE ORAL 2 TIMES DAILY
COMMUNITY

## 2024-01-26 RX ORDER — ACETAMINOPHEN 325 MG/1
650 TABLET ORAL EVERY 6 HOURS PRN
COMMUNITY

## 2024-01-26 RX ORDER — METHYLPHENIDATE HYDROCHLORIDE 10 MG/1
10 TABLET ORAL 2 TIMES DAILY
COMMUNITY

## 2024-01-26 RX ORDER — UREA 10 %
1 LOTION (ML) TOPICAL NIGHTLY PRN
COMMUNITY

## 2024-01-26 RX ORDER — OXYCODONE HYDROCHLORIDE 5 MG/1
5 TABLET ORAL EVERY 4 HOURS PRN
COMMUNITY

## 2024-01-26 RX ORDER — TIZANIDINE 4 MG/1
4 TABLET ORAL 3 TIMES DAILY
COMMUNITY

## 2024-01-26 RX ORDER — HYDRALAZINE HYDROCHLORIDE 50 MG/1
50 TABLET, FILM COATED ORAL EVERY 6 HOURS
COMMUNITY

## 2024-01-26 RX ORDER — AMLODIPINE BESYLATE 10 MG/1
10 TABLET ORAL DAILY
COMMUNITY

## 2024-01-26 RX ORDER — FLUOXETINE HYDROCHLORIDE 20 MG/1
20 CAPSULE ORAL DAILY
COMMUNITY

## 2024-01-26 RX ORDER — CARVEDILOL 12.5 MG/1
12.5 TABLET ORAL 2 TIMES DAILY WITH MEALS
COMMUNITY

## 2024-01-26 RX ORDER — TRAZODONE HYDROCHLORIDE 50 MG/1
50 TABLET ORAL NIGHTLY
COMMUNITY

## 2024-01-26 RX ORDER — HYDROCHLOROTHIAZIDE 25 MG/1
25 TABLET ORAL DAILY
COMMUNITY

## 2024-01-26 RX ORDER — AMOXICILLIN 250 MG
1 CAPSULE ORAL DAILY
COMMUNITY
End: 2024-01-26

## 2024-01-26 RX ORDER — MELATONIN
1000 DAILY
COMMUNITY

## 2024-01-26 RX ORDER — ONDANSETRON 4 MG/1
4 TABLET, FILM COATED ORAL EVERY 6 HOURS PRN
COMMUNITY

## 2024-01-26 RX ORDER — CLONIDINE HYDROCHLORIDE 0.2 MG/1
0.2 TABLET ORAL EVERY 6 HOURS
COMMUNITY

## 2024-01-26 RX ORDER — DOXAZOSIN MESYLATE 4 MG/1
4 TABLET ORAL NIGHTLY
COMMUNITY

## 2024-01-26 NOTE — PROGRESS NOTES
Transitional Care Follow Up Visit  Subjective     John Cooper is a 39 y.o. male who presents for a transitional care management visit.[facility] S/P CVA 11/18/23.  He is accompanied by mom and dad who help with caregiving.   Doing well since CVA secondary to right sided brain bleed November 18.  Patient had been at work and had had a headache and then found to have stroke.  He had significant swelling and had due to bleed and required ventilation via trach x 3 weeks as well as G-tube feeds.  He is currently wearing helmet to protect the head until plate put back in.  He does have headaches and nerve pain on his left side but medications seem to be helping this.    Patient's main complaint right now is that he is cold all the time and that he feels like he takes too much medication.      Within 48 business hours after discharge our office contacted him via telephone to coordinate his care and needs.      I reviewed and discussed the details of that call along with the discharge summary, hospital problems, inpatient lab results, inpatient diagnostic studies, and consultation reports with John.     Current outpatient and discharge medications have been reconciled for the patient.  Reviewed by: MINESH Gupta           No data to display              Risk for Readmission (LACE) No data recorded    History of Present Illness   Course During Hospital Stay:  as above     The following portions of the patient's history were reviewed and updated as appropriate: allergies, current medications, past family history, past medical history, past social history, past surgical history, and problem list.    Review of Systems patient's sleep is okay on trazodone.  Blood pressure was hard to manage and seems to be managed pretty well with current medications.  Patient's family members take it regularly and have a chart with all his blood pressure readings.  He has no chest pain or palpitations.  Sometimes maybe a little  lightheaded but not significant and able to do physical therapy.  Pain seems to be controlled pretty well on current medications.  He did have constipation on pain medication but this is being managed currently.  He is on Ritalin for focus without side effects.    Objective   Physical Exam  Vitals and nursing note reviewed.   Constitutional:       General: He is not in acute distress.     Appearance: He is well-developed. He is ill-appearing (chronically). He is not diaphoretic.   HENT:      Head: Normocephalic.      Comments: Right sided temporal fluid collection that is soft and nontender     Mouth/Throat:      Mouth: Mucous membranes are moist.      Pharynx: No posterior oropharyngeal erythema.   Eyes:      General: No scleral icterus.        Right eye: No discharge.         Left eye: No discharge.      Conjunctiva/sclera: Conjunctivae normal.   Cardiovascular:      Rate and Rhythm: Normal rate and regular rhythm.   Pulmonary:      Effort: Pulmonary effort is normal.      Breath sounds: Normal breath sounds.   Abdominal:      General: There is no distension.      Palpations: Abdomen is soft.      Comments: BS little sluggish   Musculoskeletal:         General: No deformity.      Comments: W/c   Left arm braced       Skin:     General: Skin is warm and dry.      Comments: Well healed right cranial incision   Neurological:      Mental Status: He is alert and oriented to person, place, and time.      Comments: Left sided weakness  Answers questions appropriately  Some gaps in memory and history   Psychiatric:         Mood and Affect: Mood normal.         Behavior: Behavior normal.         Assessment & Plan       Initially some confusion over meds.  We discussed pt concerns about too many meds and I reviewed discharge summary-both [facility] and hospital, plans, labs, radiology.  I have given parents copies of med lists and what meds are for.  I do recommend stopping stool softener in favor of miralax and titrate as  needed for BM and increase H2O intake.  We discussed which BP meds they may be able to go down on first and times to take as well as side effects.  All have an excellent understanding of meds, ongoing care.  Mom and dad are very engaged and taking excellent care of patient.  He has not received any HHC and will reach out to CM to see if they are able to get HHC while going to OP at [facility].  Would like to see if bathing assistance, skin care to prevent breakdown.  Mom looks at skin daily.     30 mins spent with pt counseling, getting history, reviewing chart, discussing plans

## 2024-01-26 NOTE — TELEPHONE ENCOUNTER
Spoke with Urmila morales pt does not need statins at this time pt is anemic at 10.7 but has appt to come get labs in next week and all notes reviewed and are correct and if bp is low they can consider d/c clonidine and hydralazine pt has fup appt 02/08/24 and to check with neuro if ok and if meds need any changes

## 2024-02-05 ENCOUNTER — TELEPHONE (OUTPATIENT)
Dept: FAMILY MEDICINE CLINIC | Facility: CLINIC | Age: 39
End: 2024-02-05

## 2024-02-05 NOTE — TELEPHONE ENCOUNTER
Caller: JEFF FARRELL    Relationship: Mother    Best call back number: [phone]    What is the best time to reach you: ANYTIME    Who are you requesting to speak with (clinical staff, provider,  specific staff member): NURSE    Do you know the name of the person who called: PATIENTS MOM    What was the call regarding: PATIENT HASN'T HAD A BOWEL MOVEMENT IN 9-10 DAYS    Is it okay if the provider responds through MyChart: NO

## 2024-02-05 NOTE — TELEPHONE ENCOUNTER
Called and spoke w/Lakeshia/Pt's Mom, more than likely he'll need an appt to be seen even, he does have an appt w/Andrea on Thursday, but if possible would need an Appt sooner.  She sttd would have to give us a call back.

## 2024-02-08 ENCOUNTER — TELEPHONE (OUTPATIENT)
Dept: FAMILY MEDICINE CLINIC | Facility: CLINIC | Age: 39
End: 2024-02-08

## 2024-02-08 NOTE — TELEPHONE ENCOUNTER
Caller: JEFF FARRELL    Relationship to patient: Mother    Best call back number: [phone]    Patient is needing: SHE STATES THAT HIS SURGEON IS GOING TO FILL THIS MEDICATION, THAT CANDY DOESN'T NEED TOO.

## 2024-02-08 NOTE — TELEPHONE ENCOUNTER
Lakeshia Cooper called regarding refilling medication gabapentin (NEURONTIN) 100 MG capsule. Pt will be completely out on Sunday 2/11. The pharmacy stated it not due for a refill until 2/15. Pt scheduled to see Andrea on 2/27.

## 2024-02-09 ENCOUNTER — HOME HEALTH ADMISSION (OUTPATIENT)
Dept: HOME HEALTH SERVICES | Facility: HOME HEALTHCARE | Age: 39
End: 2024-02-09
Payer: COMMERCIAL

## 2024-02-14 ENCOUNTER — TELEPHONE (OUTPATIENT)
Dept: FAMILY MEDICINE CLINIC | Facility: CLINIC | Age: 39
End: 2024-02-14
Payer: COMMERCIAL

## 2024-02-14 NOTE — TELEPHONE ENCOUNTER
Lakeshia Cooper called requesting an order for pt to get a mattress with a pump for his hospital bed. [facility] stated an order will be needed

## 2024-02-21 ENCOUNTER — TELEPHONE (OUTPATIENT)
Dept: FAMILY MEDICINE CLINIC | Facility: CLINIC | Age: 39
End: 2024-02-21
Payer: COMMERCIAL

## 2024-02-21 NOTE — TELEPHONE ENCOUNTER
Caller: JEFF FARRELL    Relationship: Mother    Best call back number: [phone]    Who are you requesting to speak with (clinical staff, provider,  specific staff member): CLINICAL     What was the call regarding: CHECKING STATUS OF ORDER FOR MATTRESS   PLEASE ADVISE

## 2024-02-21 NOTE — TELEPHONE ENCOUNTER
April called from [facility] regarding an order for a low air loss mattress insurance will not cover until a gel overlay is ruled out.     April [phone]

## 2024-02-22 NOTE — TELEPHONE ENCOUNTER
Spoke with [employer] as the order was drafted and waiting on provider signature. This was faxed back yesterday.

## 2024-02-29 ENCOUNTER — OFFICE VISIT (OUTPATIENT)
Dept: FAMILY MEDICINE CLINIC | Facility: CLINIC | Age: 39
End: 2024-02-29
Payer: COMMERCIAL

## 2024-02-29 VITALS
DIASTOLIC BLOOD PRESSURE: 82 MMHG | OXYGEN SATURATION: 98 % | TEMPERATURE: 98.4 F | WEIGHT: 187 LBS | HEIGHT: 68 IN | HEART RATE: 88 BPM | BODY MASS INDEX: 28.34 KG/M2 | RESPIRATION RATE: 14 BRPM | SYSTOLIC BLOOD PRESSURE: 111 MMHG

## 2024-02-29 DIAGNOSIS — Z74.09 LIMITED MOBILITY: ICD-10-CM

## 2024-02-29 DIAGNOSIS — Z86.73 HISTORY OF STROKE: ICD-10-CM

## 2024-02-29 DIAGNOSIS — B37.0 THRUSH: ICD-10-CM

## 2024-02-29 DIAGNOSIS — D64.9 ANEMIA, UNSPECIFIED TYPE: ICD-10-CM

## 2024-02-29 DIAGNOSIS — I10 PRIMARY HYPERTENSION: Primary | ICD-10-CM

## 2024-02-29 RX ORDER — SPIRONOLACTONE 25 MG/1
25 TABLET ORAL DAILY
Qty: 30 TABLET | Refills: 11 | Status: CANCELLED | OUTPATIENT
Start: 2024-02-29

## 2024-02-29 RX ORDER — FLUOXETINE HYDROCHLORIDE 20 MG/1
20 CAPSULE ORAL DAILY
Status: CANCELLED | OUTPATIENT
Start: 2024-02-29

## 2024-02-29 RX ORDER — DOXAZOSIN MESYLATE 4 MG/1
4 TABLET ORAL NIGHTLY
Status: CANCELLED | OUTPATIENT
Start: 2024-02-29

## 2024-02-29 RX ORDER — UREA 10 %
1 LOTION (ML) TOPICAL NIGHTLY PRN
Qty: 30 TABLET | Status: CANCELLED | OUTPATIENT
Start: 2024-02-29

## 2024-02-29 RX ORDER — ROSUVASTATIN CALCIUM 40 MG/1
20 TABLET, COATED ORAL NIGHTLY
Qty: 90 TABLET | Refills: 3 | Status: CANCELLED | OUTPATIENT
Start: 2024-02-29

## 2024-02-29 RX ORDER — HYDROCHLOROTHIAZIDE 25 MG/1
25 TABLET ORAL DAILY
Status: CANCELLED | OUTPATIENT
Start: 2024-02-29

## 2024-02-29 RX ORDER — CLONIDINE HYDROCHLORIDE 0.2 MG/1
0.2 TABLET ORAL EVERY 6 HOURS
Status: CANCELLED | OUTPATIENT
Start: 2024-02-29

## 2024-02-29 RX ORDER — TRAZODONE HYDROCHLORIDE 50 MG/1
50 TABLET ORAL NIGHTLY
Status: CANCELLED | OUTPATIENT
Start: 2024-02-29

## 2024-02-29 RX ORDER — TIZANIDINE 4 MG/1
2 TABLET ORAL 3 TIMES DAILY
Status: CANCELLED | OUTPATIENT
Start: 2024-02-29

## 2024-02-29 RX ORDER — CARVEDILOL 12.5 MG/1
12.5 TABLET ORAL 2 TIMES DAILY WITH MEALS
Status: CANCELLED | OUTPATIENT
Start: 2024-02-29

## 2024-02-29 RX ORDER — MELATONIN
1000 DAILY
Status: CANCELLED | OUTPATIENT
Start: 2024-02-29

## 2024-02-29 RX ORDER — METHYLPHENIDATE HYDROCHLORIDE 10 MG/1
10 TABLET ORAL 2 TIMES DAILY
Status: CANCELLED | OUTPATIENT
Start: 2024-02-29

## 2024-02-29 RX ORDER — HYDRALAZINE HYDROCHLORIDE 50 MG/1
50 TABLET, FILM COATED ORAL EVERY 6 HOURS
Status: CANCELLED | OUTPATIENT
Start: 2024-02-29

## 2024-02-29 RX ORDER — GABAPENTIN 100 MG/1
100 CAPSULE ORAL 2 TIMES DAILY
Status: CANCELLED | OUTPATIENT
Start: 2024-02-29

## 2024-02-29 RX ORDER — AMLODIPINE BESYLATE 10 MG/1
10 TABLET ORAL DAILY
Status: CANCELLED | OUTPATIENT
Start: 2024-02-29

## 2024-02-29 NOTE — PATIENT INSTRUCTIONS
Stop clonidine  Stop hydralazine    Carvedilol twice per day  Amlodipine at night  Doxazosin at night      Spironalactone in am  Hydroxyzine in am

## 2024-02-29 NOTE — PROGRESS NOTES
Chief Complaint  Hospital Follow Up Visit, Thrush, and Med Management (Pt is wanting to discuss blood pressure meds. Pt mother states bp has ran really low)    Subjective        John Cooper Jr. presents to [facility]  History of Present Illness patient returns today accompanied by mom and dad for follow-up on hypertension, status post stroke.  Patient continues physical therapy via [facility].  He has recently had some episodes of syncope due to orthostatic hypotension.  Mom and dad have been monitoring blood pressure which has been running low.  Patient reports that he is quite cold most of the time and also has new cramp  hamstring of left leg.  Has been working with physical therapy on stretching but this seems to worsen symptoms.    Since last visit patient had staples removed and good healing of scalp.  He has 1 suture that is sticking up that they were told should be removed by primary.  Patient continues to have left-sided weakness and has 3 times weekly follow-up with physical therapy.    Mom and dad are requesting bed to help patient with movement while in bed and to prevent any bedsores as they are not able to get any home health care with physical therapy at [facility].  They are also requesting a pillow to support legs better in wheelchair.    Mom and dad have been adjusting his blood pressure medications due to hypotension.  They were not aware that they were to decrease clonidine to twice daily but essentially have stopped it.  They have decreased carvedilol to just in the morning and hydralazine to daily.  They have continued the doxazosin and the a.m. diuretics.  Admit patient probably is not drinking enough water.  300 mg dose of gabapentin makes patient excessively drowsy and evening and hard to wake up in the morning especially when he has physical therapy.  His pain has been very manageable and have decreased gabapentin 200 mg at night.  He has continued the trazodone.   "They have been unable to get Ritalin which he was taking for focus.      Objective   Vital Signs:  /82   Pulse 88   Temp 98.4 °F (36.9 °C) (Temporal)   Resp 14   Ht 172.7 cm (68\")   Wt 84.8 kg (187 lb)   SpO2 98%   BMI 28.43 kg/m²   Estimated body mass index is 28.43 kg/m² as calculated from the following:    Height as of this encounter: 172.7 cm (68\").    Weight as of this encounter: 84.8 kg (187 lb).               Physical Exam  Vitals and nursing note reviewed.   Constitutional:       General: He is not in acute distress.     Appearance: He is well-developed. He is ill-appearing (Chronically). He is not diaphoretic.   HENT:      Head: Normocephalic and atraumatic.      Mouth/Throat:      Mouth: Mucous membranes are dry.      Comments: White coating on tongue-cottage cheese appearing  Eyes:      General: No scleral icterus.        Right eye: No discharge.         Left eye: No discharge.      Conjunctiva/sclera: Conjunctivae normal.   Cardiovascular:      Rate and Rhythm: Normal rate and regular rhythm.   Pulmonary:      Effort: Pulmonary effort is normal.      Breath sounds: Normal breath sounds. No wheezing or rales.   Abdominal:      General: Bowel sounds are normal.      Palpations: Abdomen is soft.   Musculoskeletal:      Comments: Wheelchair with flaccid left arm, left leg weakness   Skin:     General: Skin is warm and dry.      Comments: Well-healed scalp incision on right  1 suture removed from scalp by MA without problem and patient tolerated well.   Neurological:      Mental Status: He is alert and oriented to person, place, and time.   Psychiatric:      Comments: Patient is conversant and mostly pleasant but does seem mildly irritable        Result Review :                     Assessment and Plan     Diagnoses and all orders for this visit:    1. Primary hypertension (Primary)    2. History of stroke    3. Limited mobility    4. Thrush  -     nystatin (MYCOSTATIN) 100,000 unit/mL suspension; " Swish and swallow 5 mL 4 (Four) Times a Day.  Dispense: 473 mL; Refill: 0    5. Anemia, unspecified type  -     CBC & Differential  -     Iron Profile  -     Vitamin B12    Primary hypertension: Patient has been more orthostatic and hypotensive recently.  We again discussed blood pressure medications and MOA of each.  They reported higher blood pressure last night but did not give p.m. dose of Coreg which I encouraged them to give regularly twice daily.  Will have them also continue doxazosin.  They will give clonidine only if needed for hypertension.  They have decreased the hydralazine to once per day and I do not think it has seemed to make much difference in looking at the blood pressure readings.  Will have them go ahead and hold it and monitor blood pressure but may need to be restarted but may be at a lower dose to avoid hypotension.  Will have them continue diuretics.  In reviewing recent labs from [facility] it looks like his hemoglobin is still low at 8.5 which I discussed is likely causing him to feel cold and fatigued.  He had a pretty significant drop over 2 weeks and hemoglobin from 10.8-8.5.  I do not know if this has been addressed.  I cannot tell if this is being addressed by neuro surge.  Although his recent sodium was normal it was only 136 and may be causing some of his leg cramping.  His potassium is also on the lower side of normal.  Creatinine was normal.  Recommend a couple sugar-free Gatorade's or such to see if this is helpful in improving his muscle spasms.  They have found that tizanidine to be extremely helpful.    Will see if we can get assistive items for home use.    Thrush: I do not think he can use lozenge with risk of choking and will give nystatin swish and spit 4 times a day.  Keep mouth moist and adequate hydration discussed.    I agree with decreasing dosing of gabapentin 100 mg at night if patient's pain is managed and he is easier to arouse in AM.  They are agreeable to let  provider know of dosing change since it is controlled.  I discussed to they should contact for both Ritalin and gabapentin.    Later entry:  spoke with mom and dad re:  drop in hgb over 2 weeks is concerning.  They report that nobody is following up on this and they were told that I will be ordering all assistive devices and that he also needs a brace for his left hand.  They do not think that anybody has addressed his anemia.  I have ordered labs which dad will come and  so that he can have them done at [facility] when they are there for therapy tomorrow or Monday.  Is very difficult for patient to come to office without appointment I had due to transportation.         Follow Up     No follow-ups on file.  Patient was given instructions and counseling regarding his condition or for health maintenance advice. Please see specific information pulled into the AVS if appropriate.

## 2024-03-01 ENCOUNTER — TELEPHONE (OUTPATIENT)
Dept: FAMILY MEDICINE CLINIC | Facility: CLINIC | Age: 39
End: 2024-03-01

## 2024-03-01 NOTE — TELEPHONE ENCOUNTER
Caller: JEFF FARRELL    Relationship to patient: Mother    Best call back number: [phone]    Patient is needing: PATIENT'S MOTHER IS NEEDING HIS LABS ORDERS FAXED [phone] . PLEASE ADVISE PATIENT'S MOTHER WHEN THIS HAS BEEN SENT.

## 2024-03-01 NOTE — TELEPHONE ENCOUNTER
Caller: JEFF FARRELL    Relationship to patient: Mother    Best call back number: [phone]      Patient is needing: PATIENT'S MOTHER (ON [facility] VERBAL) STATES THAT THE LAB ORDERS WHICH WERE FAXED WERE MISSING PROVIDER'S ELECTRONIC SIGNATURE.  SHE REQUESTS THAT THE LAB ORDERS BE SIGNED AND RE-FAXED.    SHE DID NOT PROVIDE ANY FURTHER DETAILS.    PLEASE ADVISE.

## 2024-03-04 ENCOUNTER — TELEPHONE (OUTPATIENT)
Dept: FAMILY MEDICINE CLINIC | Facility: CLINIC | Age: 39
End: 2024-03-04

## 2024-03-04 RX ORDER — FERROUS GLUCONATE 324(38)MG
324 TABLET ORAL
Qty: 90 TABLET | Refills: 0 | Status: SHIPPED | OUTPATIENT
Start: 2024-03-04 | End: 2024-03-05 | Stop reason: SDUPTHER

## 2024-03-04 RX ORDER — MULTIVIT WITH MINERALS/LUTEIN
250 TABLET ORAL DAILY
Qty: 90 TABLET | Refills: 0 | Status: SHIPPED | OUTPATIENT
Start: 2024-03-04 | End: 2024-03-05 | Stop reason: SDUPTHER

## 2024-03-04 NOTE — TELEPHONE ENCOUNTER
Caller: JEFF FARRELL    Relationship to patient: Mother    Best call back number: [phone]     Patient is needing: PATIENT MOTHER REQUESTING A CALL BACK TO DISCUSS LABS THAT WERE DONE. PLEASE CALL AND ADVISE.

## 2024-03-05 NOTE — TELEPHONE ENCOUNTER
Called spoke to pt mother and pt in detail regarding lab results. Pt and pt mother both voiced understanding

## 2024-03-05 NOTE — TELEPHONE ENCOUNTER
Rx Refill Note  Requested Prescriptions     Pending Prescriptions Disp Refills    ferrous gluconate (FERGON) 324 MG tablet 90 tablet 0     Sig: Take 1 tablet by mouth Daily With Breakfast. Take with vitamin C    vitamin C (ASCORBIC ACID) 250 MG tablet 90 tablet 0     Sig: Take 1 tablet by mouth Daily. Take with iron      Last office visit with prescribing clinician: 2/29/2024   Last telemedicine visit with prescribing clinician: Visit date not found   Next office visit with prescribing clinician: Visit date not found                         Would you like a call back once the refill request has been completed: [] Yes [] No    If the office needs to give you a call back, can they leave a voicemail: [] Yes [] No    Geovanny Mckeon Rep  03/05/24, 13:53 EST

## 2024-03-06 ENCOUNTER — TELEPHONE (OUTPATIENT)
Dept: FAMILY MEDICINE CLINIC | Facility: CLINIC | Age: 39
End: 2024-03-06

## 2024-03-06 RX ORDER — MULTIVIT WITH MINERALS/LUTEIN
250 TABLET ORAL DAILY
Qty: 90 TABLET | Refills: 0 | Status: SHIPPED | OUTPATIENT
Start: 2024-03-06

## 2024-03-06 RX ORDER — FERROUS GLUCONATE 324(38)MG
324 TABLET ORAL
Qty: 90 TABLET | Refills: 0 | Status: SHIPPED | OUTPATIENT
Start: 2024-03-06

## 2024-03-06 NOTE — TELEPHONE ENCOUNTER
Caller: JEFF FARRELL    Relationship to patient: Mother    Best call back number: [phone]     Patient is needing: PATIENT MOTHER CALLING TO CHECK THE STATUS OF THE MATTRESS ORDERED FOR HIS BED. COMPANY RECEIVED THE CUSHION ORDER FOR WHEELCHAIR BUT NOT FOR MATTRESS.

## 2024-03-07 NOTE — TELEPHONE ENCOUNTER
Called spoke to pt mother in detail regarding the mattress for pt. Spoke to [facility] and let pt mother know that pt is needing to try and fail the gel mattress before a order can be placed for the low air loss mattress. Pt mother was made aware of this and voiced understanding. Faxed over new order to [employer] for a gel mattress.

## 2024-03-08 ENCOUNTER — TELEPHONE (OUTPATIENT)
Dept: FAMILY MEDICINE CLINIC | Facility: CLINIC | Age: 39
End: 2024-03-08

## 2024-07-02 ENCOUNTER — OFFICE VISIT (OUTPATIENT)
Dept: FAMILY MEDICINE CLINIC | Facility: CLINIC | Age: 39
End: 2024-07-02
Payer: MEDICAID

## 2024-07-02 VITALS
BODY MASS INDEX: 28.34 KG/M2 | DIASTOLIC BLOOD PRESSURE: 79 MMHG | TEMPERATURE: 98 F | OXYGEN SATURATION: 98 % | SYSTOLIC BLOOD PRESSURE: 119 MMHG | WEIGHT: 187 LBS | HEIGHT: 68 IN | RESPIRATION RATE: 12 BRPM | HEART RATE: 65 BPM

## 2024-07-02 DIAGNOSIS — Z74.09 LIMITED MOBILITY: ICD-10-CM

## 2024-07-02 DIAGNOSIS — R73.9 HYPERGLYCEMIA: ICD-10-CM

## 2024-07-02 DIAGNOSIS — R53.83 FATIGUE, UNSPECIFIED TYPE: ICD-10-CM

## 2024-07-02 DIAGNOSIS — I10 PRIMARY HYPERTENSION: ICD-10-CM

## 2024-07-02 DIAGNOSIS — Z86.73 HISTORY OF STROKE: Primary | ICD-10-CM

## 2024-07-02 DIAGNOSIS — H61.23 IMPACTED CERUMEN OF BOTH EARS: ICD-10-CM

## 2024-07-02 DIAGNOSIS — F51.04 PSYCHOPHYSIOLOGICAL INSOMNIA: ICD-10-CM

## 2024-07-02 DIAGNOSIS — D50.8 OTHER IRON DEFICIENCY ANEMIA: ICD-10-CM

## 2024-07-02 DIAGNOSIS — M79.2 NERVE PAIN: ICD-10-CM

## 2024-07-02 PROCEDURE — 1159F MED LIST DOCD IN RCRD: CPT | Performed by: NURSE PRACTITIONER

## 2024-07-02 PROCEDURE — 3078F DIAST BP <80 MM HG: CPT | Performed by: NURSE PRACTITIONER

## 2024-07-02 PROCEDURE — 1125F AMNT PAIN NOTED PAIN PRSNT: CPT | Performed by: NURSE PRACTITIONER

## 2024-07-02 PROCEDURE — 1160F RVW MEDS BY RX/DR IN RCRD: CPT | Performed by: NURSE PRACTITIONER

## 2024-07-02 PROCEDURE — 99214 OFFICE O/P EST MOD 30 MIN: CPT | Performed by: NURSE PRACTITIONER

## 2024-07-02 PROCEDURE — 3074F SYST BP LT 130 MM HG: CPT | Performed by: NURSE PRACTITIONER

## 2024-07-02 RX ORDER — CLONIDINE HYDROCHLORIDE 0.2 MG/1
0.2 TABLET ORAL EVERY 6 HOURS
Qty: 90 TABLET | Refills: 1 | Status: CANCELLED | OUTPATIENT
Start: 2024-07-02

## 2024-07-02 RX ORDER — DOXAZOSIN MESYLATE 4 MG/1
4 TABLET ORAL NIGHTLY
Qty: 90 TABLET | Refills: 1 | Status: SHIPPED | OUTPATIENT
Start: 2024-07-02

## 2024-07-02 RX ORDER — TRAZODONE HYDROCHLORIDE 50 MG/1
50-100 TABLET ORAL NIGHTLY
Qty: 180 TABLET | Refills: 1 | Status: SHIPPED | OUTPATIENT
Start: 2024-07-02

## 2024-07-02 RX ORDER — HYDROCHLOROTHIAZIDE 25 MG/1
25 TABLET ORAL DAILY
Qty: 90 TABLET | Refills: 1 | Status: SHIPPED | OUTPATIENT
Start: 2024-07-02 | End: 2024-07-18

## 2024-07-02 RX ORDER — CARVEDILOL 12.5 MG/1
12.5 TABLET ORAL 2 TIMES DAILY WITH MEALS
Qty: 180 TABLET | Refills: 1 | Status: SHIPPED | OUTPATIENT
Start: 2024-07-02

## 2024-07-02 RX ORDER — AMLODIPINE BESYLATE 10 MG/1
10 TABLET ORAL DAILY
Qty: 90 TABLET | Refills: 1 | Status: SHIPPED | OUTPATIENT
Start: 2024-07-02

## 2024-07-02 RX ORDER — GABAPENTIN 100 MG/1
100 CAPSULE ORAL 2 TIMES DAILY
Status: CANCELLED | OUTPATIENT
Start: 2024-07-02

## 2024-07-02 RX ORDER — ZOLPIDEM TARTRATE 5 MG/1
5 TABLET ORAL
COMMUNITY
Start: 2024-05-03 | End: 2025-05-08

## 2024-07-02 RX ORDER — DULOXETINE 40 MG/1
40 CAPSULE, DELAYED RELEASE ORAL DAILY
Qty: 90 CAPSULE | Refills: 1 | Status: SHIPPED | OUTPATIENT
Start: 2024-07-02

## 2024-07-02 RX ORDER — ZOLPIDEM TARTRATE 5 MG/1
5 TABLET ORAL NIGHTLY PRN
Qty: 90 TABLET | Refills: 1 | Status: CANCELLED | OUTPATIENT
Start: 2024-07-02 | End: 2025-07-07

## 2024-07-02 RX ORDER — TIZANIDINE 4 MG/1
4 TABLET ORAL 3 TIMES DAILY
Qty: 180 TABLET | Refills: 1 | Status: SHIPPED | OUTPATIENT
Start: 2024-07-02

## 2024-07-02 RX ORDER — SPIRONOLACTONE 25 MG/1
25 TABLET ORAL DAILY
Qty: 90 TABLET | Refills: 1 | Status: SHIPPED | OUTPATIENT
Start: 2024-07-02

## 2024-07-02 RX ORDER — DULOXETINE 40 MG/1
40 CAPSULE, DELAYED RELEASE ORAL DAILY
COMMUNITY
Start: 2024-05-08 | End: 2024-07-02 | Stop reason: SDUPTHER

## 2024-07-02 NOTE — PROGRESS NOTES
Chief Complaint  Follow-up    Candace Cooper Jr. presents to [facility]  History of Present Illness    History of Present Illness  The patient is a 38-year-old male who presents for evaluation of multiple medical concerns. He is accompanied by his 8-year-old daughter and mother who assists with history.    The patient recently commenced aquatic therapy, followed by an hour-long gym session at [facility] tomorrow. He resumed [facility] on 09/01/2023 due to a therapeutic break, which resulted in a 3-month hiatus. Despite this, he continues to experience pain in his left leg and foot, particularly in his foot, ankle, and toes. His left arm mobility is limited, but his condition is improving daily.    He has recently acquired a new brace for and a boot for foot elevation. He is scheduled to return on 08/08/2023 for fitting.     He remains cold.  Taking iron and vitamin C daily.  No side effects.    He has been advised to stretch his toes and ankles. He receives Botox injections for his arm and leg every 3 months, which he reports as beneficial.    The patient monitors his blood pressure at home, which typically ranges from 106/75. He denies experiencing dizziness. His current medication regimen includes amlodipine, doxazosin, spironolactone, and carvedilol.    The patient's mother reports that he snores frequently and has gained weight. She requests a sleep study.    The patient recently received new glasses. He reports dry and peeling skin. He maintains adequate hydration and sugar-free Gatorade. He denies any issues with urination or bowel movements, including hematuria or hematochezia. He denies any skin breakdown. His gabapentin prescription is refilled by Dr. Figueroa. His Prozac was switched to Cymbalta, and he takes tizanidine 3 times a day, which aids in sleep. He denies daytime somnolence. He takes trazodone at night and Ambien as needed. He wakes up at 2:00 AM for approximately  "an hour before returning to sleep. He denies experiencing chest pain, palpitations, or shortness of breath.    The patient occasionally experiences ear blockage, which he manages with earwax removal.       Objective   Vital Signs:  /79   Pulse 65   Temp 98 °F (36.7 °C) (Temporal)   Resp 12   Ht 172.7 cm (68\")   Wt 84.8 kg (187 lb)   SpO2 98%   BMI 28.43 kg/m²   Estimated body mass index is 28.43 kg/m² as calculated from the following:    Height as of this encounter: 172.7 cm (68\").    Weight as of this encounter: 84.8 kg (187 lb).       BMI is >= 25 and <30. (Overweight) The following options were offered after discussion;: Information on healthy weight added to patient's after visit summary.      Physical Exam  Vitals and nursing note reviewed.   Constitutional:       General: He is not in acute distress.     Appearance: He is well-developed. He is not ill-appearing or diaphoretic.   HENT:      Head:      Comments: Well-healed scalp incision     Right Ear: External ear normal.      Left Ear: External ear normal. There is impacted cerumen.      Mouth/Throat:      Mouth: Mucous membranes are moist.      Pharynx: No posterior oropharyngeal erythema.   Eyes:      General: No scleral icterus.        Right eye: No discharge.         Left eye: No discharge.      Conjunctiva/sclera: Conjunctivae normal.   Cardiovascular:      Rate and Rhythm: Normal rate and regular rhythm.      Heart sounds: Normal heart sounds.   Pulmonary:      Effort: Pulmonary effort is normal.      Breath sounds: Normal breath sounds.   Abdominal:      General: Bowel sounds are normal.      Palpations: Abdomen is soft.      Tenderness: There is no abdominal tenderness.   Musculoskeletal:      Cervical back: Neck supple.      Comments: Wheelchair  Has a little mobility of left arm which is new since I saw him last  More strength in lower extremities but still very weak and not ambulatory   Lymphadenopathy:      Cervical: No cervical " adenopathy.   Skin:     General: Skin is warm and dry.   Neurological:      Mental Status: He is alert and oriented to person, place, and time.   Psychiatric:         Mood and Affect: Mood normal.         Behavior: Behavior normal.          Physical Exam       Result Review :            Results  Laboratory Studies  B12 levels were good. Kidney function was good.                Assessment and Plan     Diagnoses and all orders for this visit:    1. History of stroke (Primary)    2. Primary hypertension  -     amLODIPine (NORVASC) 10 MG tablet; Take 1 tablet by mouth Daily.  Dispense: 90 tablet; Refill: 1  -     carvedilol (COREG) 12.5 MG tablet; Take 1 tablet by mouth 2 (Two) Times a Day With Meals.  Dispense: 180 tablet; Refill: 1  -     doxazosin (CARDURA) 4 MG tablet; Take 1 tablet by mouth Every Night.  Dispense: 90 tablet; Refill: 1  -     hydroCHLOROthiazide 25 MG tablet; Take 1 tablet by mouth Daily.  Dispense: 90 tablet; Refill: 1  -     spironolactone (ALDACTONE) 25 MG tablet; Take 1 tablet by mouth Daily.  Dispense: 90 tablet; Refill: 1  -     CBC (No Diff)  -     Comprehensive Metabolic Panel    3. Limited mobility    4. Psychophysiological insomnia  -     traZODone (DESYREL) 50 MG tablet; Take 1-2 tablets by mouth Every Night.  Dispense: 180 tablet; Refill: 1    5. Other iron deficiency anemia  -     Iron Profile    6. Nerve pain  -     DULoxetine HCl 40 MG capsule delayed-release particles; Take 1 capsule by mouth Daily.  Dispense: 90 capsule; Refill: 1  -     Vitamin B12    7. Fatigue, unspecified type  -     CBC (No Diff)  -     TSH Rfx On Abnormal To Free T4  -     Iron Profile  -     Vitamin D,25-Hydroxy  -     Ambulatory Referral to Sleep Medicine    8. Hyperglycemia  -     Hemoglobin A1c    9. Impacted cerumen of both ears    Other orders  -     tiZANidine (ZANAFLEX) 4 MG tablet; Take 1 tablet by mouth 3 (Three) Times a Day.  Dispense: 180 tablet; Refill: 1        Assessment & Plan  Patient  continues to improve with PT, OT, speech therapy status post stroke.  Continues follow-up with [facility] and neurosurg.    Hypertension is well-controlled with current medications.  Will continue.  Family is monitoring blood pressure regularly and doing well with making sure patient is getting blood pressure medications.  Will check labs today.    A sleep study will be ordered to assess for sleep apnea     Insomnia.  The dosage of trazodone will be increased to 1.5 tablets.  They could even increase to 2 tablets.  Would rather him use the trazodone then the Ambien.  The controlled substances are being filled by neurosurg.  Family and patient are on board with this.  They will avoid giving multiple agents causing drowsiness together.    Patient is still fatigued and was iron deficient.  Will check iron and thyroid, CBC and vitamin D.  Fatigue may also be caused by sleep apnea and have referred him.  Also he is on multiple agents that can cause drowsiness which we discussed.    He has had some hyperglycemia previously and we will check an A1c today.    Cerumen impaction  MA removed cerumen bilateral ears using lavage.  Patient tolerated well.            Follow Up     No follow-ups on file.  Patient was given instructions and counseling regarding his condition or for health maintenance advice. Please see specific information pulled into the AVS if appropriate.    Patient or patient representative verbalized consent for the use of Ambient Listening during the visit with  MINESH Gupta for chart documentation. 7/16/2024  07:30 EDT

## 2024-07-03 LAB
25(OH)D3+25(OH)D2 SERPL-MCNC: 30.8 NG/ML (ref 30–100)
ALBUMIN SERPL-MCNC: 4.6 G/DL (ref 3.5–5.2)
ALBUMIN/GLOB SERPL: 1.4 G/DL
ALP SERPL-CCNC: 95 U/L (ref 39–117)
ALT SERPL-CCNC: 11 U/L (ref 1–41)
AST SERPL-CCNC: 12 U/L (ref 1–40)
BILIRUB SERPL-MCNC: 0.2 MG/DL (ref 0–1.2)
BUN SERPL-MCNC: 24 MG/DL (ref 6–20)
BUN/CREAT SERPL: 14.1 (ref 7–25)
CALCIUM SERPL-MCNC: 10.2 MG/DL (ref 8.6–10.5)
CHLORIDE SERPL-SCNC: 100 MMOL/L (ref 98–107)
CO2 SERPL-SCNC: 26.5 MMOL/L (ref 22–29)
CREAT SERPL-MCNC: 1.7 MG/DL (ref 0.76–1.27)
EGFRCR SERPLBLD CKD-EPI 2021: 51.6 ML/MIN/1.73
ERYTHROCYTE [DISTWIDTH] IN BLOOD BY AUTOMATED COUNT: 14.8 % (ref 12.3–15.4)
GLOBULIN SER CALC-MCNC: 3.3 GM/DL
GLUCOSE SERPL-MCNC: 83 MG/DL (ref 65–99)
HBA1C MFR BLD: 5.3 % (ref 4.8–5.6)
HCT VFR BLD AUTO: 37.6 % (ref 37.5–51)
HGB BLD-MCNC: 12.3 G/DL (ref 13–17.7)
IRON SATN MFR SERPL: 15 % (ref 20–50)
IRON SERPL-MCNC: 73 MCG/DL (ref 59–158)
MCH RBC QN AUTO: 27 PG (ref 26.6–33)
MCHC RBC AUTO-ENTMCNC: 32.7 G/DL (ref 31.5–35.7)
MCV RBC AUTO: 82.6 FL (ref 79–97)
PLATELET # BLD AUTO: 236 10*3/MM3 (ref 140–450)
POTASSIUM SERPL-SCNC: 4.7 MMOL/L (ref 3.5–5.2)
PROT SERPL-MCNC: 7.9 G/DL (ref 6–8.5)
RBC # BLD AUTO: 4.55 10*6/MM3 (ref 4.14–5.8)
SODIUM SERPL-SCNC: 139 MMOL/L (ref 136–145)
TIBC SERPL-MCNC: 474 MCG/DL
TSH SERPL DL<=0.005 MIU/L-ACNC: 2.69 UIU/ML (ref 0.27–4.2)
UIBC SERPL-MCNC: 401 MCG/DL (ref 112–346)
VIT B12 SERPL-MCNC: 626 PG/ML (ref 211–946)
WBC # BLD AUTO: 5.06 10*3/MM3 (ref 3.4–10.8)

## 2024-07-11 RX ORDER — FERROUS GLUCONATE 324(38)MG
324 TABLET ORAL
Qty: 90 TABLET | Refills: 0 | Status: SHIPPED | OUTPATIENT
Start: 2024-07-11 | End: 2024-07-12 | Stop reason: SDUPTHER

## 2024-07-11 RX ORDER — MULTIVIT WITH MINERALS/LUTEIN
250 TABLET ORAL DAILY
Qty: 90 TABLET | Refills: 0 | Status: SHIPPED | OUTPATIENT
Start: 2024-07-11 | End: 2024-07-12 | Stop reason: SDUPTHER

## 2024-07-12 RX ORDER — MULTIVIT WITH MINERALS/LUTEIN
250 TABLET ORAL DAILY
Qty: 90 TABLET | Refills: 0 | Status: SHIPPED | OUTPATIENT
Start: 2024-07-12

## 2024-07-12 RX ORDER — FERROUS GLUCONATE 324(38)MG
324 TABLET ORAL
Qty: 90 TABLET | Refills: 0 | Status: SHIPPED | OUTPATIENT
Start: 2024-07-12

## 2024-07-18 ENCOUNTER — OFFICE VISIT (OUTPATIENT)
Dept: FAMILY MEDICINE CLINIC | Facility: CLINIC | Age: 39
End: 2024-07-18
Payer: MEDICAID

## 2024-07-18 VITALS
TEMPERATURE: 97.5 F | HEART RATE: 72 BPM | HEIGHT: 68 IN | BODY MASS INDEX: 28.34 KG/M2 | WEIGHT: 187 LBS | RESPIRATION RATE: 12 BRPM | OXYGEN SATURATION: 98 % | DIASTOLIC BLOOD PRESSURE: 69 MMHG | SYSTOLIC BLOOD PRESSURE: 102 MMHG

## 2024-07-18 DIAGNOSIS — Z86.73 HISTORY OF STROKE: ICD-10-CM

## 2024-07-18 DIAGNOSIS — I95.2 HYPOTENSION DUE TO DRUGS: ICD-10-CM

## 2024-07-18 DIAGNOSIS — K59.00 CONSTIPATION, UNSPECIFIED CONSTIPATION TYPE: ICD-10-CM

## 2024-07-18 DIAGNOSIS — I10 PRIMARY HYPERTENSION: ICD-10-CM

## 2024-07-18 DIAGNOSIS — R79.89 ELEVATED SERUM CREATININE: Primary | ICD-10-CM

## 2024-07-18 LAB
BUN SERPL-MCNC: 19 MG/DL (ref 6–20)
BUN/CREAT SERPL: 12 (ref 7–25)
CALCIUM SERPL-MCNC: 9.3 MG/DL (ref 8.6–10.5)
CHLORIDE SERPL-SCNC: 105 MMOL/L (ref 98–107)
CO2 SERPL-SCNC: 28.1 MMOL/L (ref 22–29)
CREAT SERPL-MCNC: 1.58 MG/DL (ref 0.76–1.27)
EGFRCR SERPLBLD CKD-EPI 2021: 56.4 ML/MIN/1.73
GLUCOSE SERPL-MCNC: 93 MG/DL (ref 65–99)
POTASSIUM SERPL-SCNC: 4.6 MMOL/L (ref 3.5–5.2)
SODIUM SERPL-SCNC: 141 MMOL/L (ref 136–145)

## 2024-07-18 PROCEDURE — 99214 OFFICE O/P EST MOD 30 MIN: CPT | Performed by: NURSE PRACTITIONER

## 2024-07-18 PROCEDURE — 1125F AMNT PAIN NOTED PAIN PRSNT: CPT | Performed by: NURSE PRACTITIONER

## 2024-07-18 PROCEDURE — 1160F RVW MEDS BY RX/DR IN RCRD: CPT | Performed by: NURSE PRACTITIONER

## 2024-07-18 PROCEDURE — 3078F DIAST BP <80 MM HG: CPT | Performed by: NURSE PRACTITIONER

## 2024-07-18 PROCEDURE — 1159F MED LIST DOCD IN RCRD: CPT | Performed by: NURSE PRACTITIONER

## 2024-07-18 PROCEDURE — 3074F SYST BP LT 130 MM HG: CPT | Performed by: NURSE PRACTITIONER

## 2024-07-18 RX ORDER — DOXAZOSIN MESYLATE 4 MG/1
4 TABLET ORAL 2 TIMES DAILY
Qty: 90 TABLET | Refills: 1 | Status: CANCELLED | OUTPATIENT
Start: 2024-07-18

## 2024-07-18 NOTE — PROGRESS NOTES
"Chief Complaint  Results (Discuss lab results)    Subjective        John Cooper Jr. presents to [facility]  History of Present Illness    History of Present Illness  The patient is a 38-year-old male who is here for follow-up with labs. He is accompanied by his parents.    He reports feeling unusually cold but denies any presence of blood in his stool or dark, tarry stools. His last lab tests were conducted in 03/2024 prior to recent. He frequently consumes sugar-free Gatorade with electrolytes and has increased his water intake since receiving his lab results. His parents believe his frequent urination is due to excessive diuretic use. Since discontinuing hydrochlorothiazide, his blood pressure has remained low, around 107/70 at home. His current medications include carvedilol twice daily, amlodipine, and doxazosin 16 mg daily which is not what I have on current med list. He denies any leg swelling since discontinuing hydrochlorothiazide. He denies experiencing chest pain, heart palpitations, headaches, dizziness, shortness of breath, or cough.    He is experiencing constipation and occasional bloating, with his last bowel movement occurring 2 days ago. He is not currently taking MiraLAX.    He has been experiencing low blood pressure. His parents have been advised to maintain a blood pressure of 110ish/70 at home or lower due to stroke. He stopped taking hydrochlorothiazide a week ago. His parents have been monitoring his blood pressure at home.    Supplemental Information  He is scheduled for a sleep study on 09/28/2024.       Objective   Vital Signs:  /69   Pulse 72   Temp 97.5 °F (36.4 °C) (Temporal)   Resp 12   Ht 172.7 cm (68\")   Wt 84.8 kg (187 lb)   SpO2 98%   BMI 28.43 kg/m²   Estimated body mass index is 28.43 kg/m² as calculated from the following:    Height as of this encounter: 172.7 cm (68\").    Weight as of this encounter: 84.8 kg (187 lb).         "       Physical Exam  Vitals and nursing note reviewed.   Constitutional:       General: He is not in acute distress.     Appearance: He is well-developed. He is not ill-appearing or diaphoretic.      Comments: Dulce on due to feeling cold   HENT:      Head: Normocephalic and atraumatic.   Eyes:      General:         Right eye: No discharge.         Left eye: No discharge.      Conjunctiva/sclera: Conjunctivae normal.   Cardiovascular:      Rate and Rhythm: Normal rate and regular rhythm.      Heart sounds: Normal heart sounds.   Pulmonary:      Effort: Pulmonary effort is normal.      Breath sounds: Normal breath sounds.   Abdominal:      General: Bowel sounds are normal. There is no distension.      Palpations: Abdomen is soft.   Musculoskeletal:      Comments: W/C   Skin:     General: Skin is warm and dry.   Neurological:      Mental Status: He is alert and oriented to person, place, and time.   Psychiatric:         Mood and Affect: Mood normal.         Behavior: Behavior normal.          Physical Exam       Result Review :            Results  Laboratory Studies  Iron levels were low, indicating anemia. Kidney function has decreased.                Assessment and Plan     Diagnoses and all orders for this visit:    1. Elevated serum creatinine (Primary)    2. Hypotension due to drugs    3. Primary hypertension  -     Basic Metabolic Panel    4. Constipation, unspecified constipation type    5. History of stroke        Assessment & Plan      Decreased kidney function.  His kidney function has decreased compared to the previous year, likely due to dehydration and extensive diuretic use. He was advised to increase his fluid intake, particularly water, and to add Crystal Light to his water if he dislikes plain water. Avoid Gatorade or other electrolyte drinks. Should his kidney function remain high, a referral to a nephrologist will be made. Recheck  BMP already ordered.     Hypotension.  His blood pressure remains  low despite discontinuing hydrochlorothiazide. He was advised to monitor his blood pressure and provide readings next week. The doxazosin dosage was reduced from 16 mg to 4 mg daily. If his blood pressure increases with the change in doxazosin, he should restart the previous dose and inform me, and we will consider reducing other medication. Recheck labs.      Anemia.  His iron levels were previously low, leading to anemia. A daily regimen of Flintstones chewable with iron was recommended.     Constipation.  He was advised to take MiraLAX every other day. Titrate for effect.          Follow Up     No follow-ups on file.  Patient was given instructions and counseling regarding his condition or for health maintenance advice. Please see specific information pulled into the AVS if appropriate.    Patient or patient representative verbalized consent for the use of Ambient Listening during the visit with  MINESH Gupta for chart documentation. 7/29/2024  13:22 EDT

## 2024-07-23 ENCOUNTER — TELEPHONE (OUTPATIENT)
Dept: FAMILY MEDICINE CLINIC | Facility: CLINIC | Age: 39
End: 2024-07-23
Payer: MEDICAID

## 2024-07-23 ENCOUNTER — TELEPHONE (OUTPATIENT)
Dept: FAMILY MEDICINE CLINIC | Facility: CLINIC | Age: 39
End: 2024-07-23

## 2024-07-23 NOTE — TELEPHONE ENCOUNTER
When calling and going over pts lab results. Pts mother had a questions about his Carvedilol.     Mother states he was taking 25mg twice a day from previous doctor    Your most recent script says 12.5mg twice a day.    Please clarify on what pt needs to be taking

## 2024-07-23 NOTE — TELEPHONE ENCOUNTER
Hub staff attempted to follow warm transfer process and was unsuccessful     Caller: JEFF FARRELL    Relationship to patient: Mother    Best call back number: [phone]     Patient is needing: PATIENTS MOTHER WAS CALLING IN AND ASKING TO SPEAK WITH THE MA. THEY WERE TOLD TO CALL BACK TO GET LAB RESULTS. ALSO, HAVING AN ISSUE WITH SOME PRESCRIPTIONS PLEASE CALL.

## 2024-07-30 DIAGNOSIS — I10 PRIMARY HYPERTENSION: ICD-10-CM

## 2024-07-30 RX ORDER — DOXAZOSIN MESYLATE 4 MG/1
8 TABLET ORAL NIGHTLY
Qty: 180 TABLET | Refills: 1 | Status: SHIPPED | OUTPATIENT
Start: 2024-07-30 | End: 2024-08-01 | Stop reason: SDUPTHER

## 2024-08-01 DIAGNOSIS — I10 PRIMARY HYPERTENSION: ICD-10-CM

## 2024-08-01 RX ORDER — DOXAZOSIN MESYLATE 4 MG/1
8 TABLET ORAL 2 TIMES DAILY
Qty: 120 TABLET | Refills: 3 | Status: SHIPPED | OUTPATIENT
Start: 2024-08-01

## 2024-08-12 ENCOUNTER — OFFICE VISIT (OUTPATIENT)
Dept: FAMILY MEDICINE CLINIC | Facility: CLINIC | Age: 39
End: 2024-08-12
Payer: MEDICAID

## 2024-08-12 VITALS
TEMPERATURE: 97.3 F | HEART RATE: 67 BPM | BODY MASS INDEX: 28.34 KG/M2 | WEIGHT: 187 LBS | SYSTOLIC BLOOD PRESSURE: 120 MMHG | RESPIRATION RATE: 12 BRPM | DIASTOLIC BLOOD PRESSURE: 78 MMHG | HEIGHT: 68 IN | OXYGEN SATURATION: 98 %

## 2024-08-12 DIAGNOSIS — R79.89 ELEVATED SERUM CREATININE: ICD-10-CM

## 2024-08-12 DIAGNOSIS — D64.9 ANEMIA, UNSPECIFIED TYPE: ICD-10-CM

## 2024-08-12 DIAGNOSIS — I10 PRIMARY HYPERTENSION: Primary | ICD-10-CM

## 2024-08-12 PROCEDURE — 3078F DIAST BP <80 MM HG: CPT | Performed by: NURSE PRACTITIONER

## 2024-08-12 PROCEDURE — 1159F MED LIST DOCD IN RCRD: CPT | Performed by: NURSE PRACTITIONER

## 2024-08-12 PROCEDURE — 1126F AMNT PAIN NOTED NONE PRSNT: CPT | Performed by: NURSE PRACTITIONER

## 2024-08-12 PROCEDURE — 3074F SYST BP LT 130 MM HG: CPT | Performed by: NURSE PRACTITIONER

## 2024-08-12 PROCEDURE — 1160F RVW MEDS BY RX/DR IN RCRD: CPT | Performed by: NURSE PRACTITIONER

## 2024-08-12 PROCEDURE — 99214 OFFICE O/P EST MOD 30 MIN: CPT | Performed by: NURSE PRACTITIONER

## 2024-08-12 RX ORDER — FERROUS GLUCONATE 324(38)MG
324 TABLET ORAL
Qty: 90 TABLET | Refills: 0 | Status: SHIPPED | OUTPATIENT
Start: 2024-08-12

## 2024-08-12 RX ORDER — DOXAZOSIN 8 MG/1
8 TABLET ORAL 2 TIMES DAILY
Qty: 180 TABLET | Refills: 1 | Status: SHIPPED | OUTPATIENT
Start: 2024-08-12

## 2024-08-12 RX ORDER — DOXAZOSIN 8 MG/1
TABLET ORAL
Qty: 90 TABLET | Refills: 1 | Status: CANCELLED | OUTPATIENT
Start: 2024-08-12

## 2024-08-19 ENCOUNTER — TELEPHONE (OUTPATIENT)
Dept: FAMILY MEDICINE CLINIC | Facility: CLINIC | Age: 39
End: 2024-08-19
Payer: MEDICAID

## 2024-08-19 DIAGNOSIS — I10 PRIMARY HYPERTENSION: Primary | ICD-10-CM

## 2024-08-19 RX ORDER — BLOOD PRESSURE TEST KIT
KIT MISCELLANEOUS
Qty: 1 EACH | Refills: 0 | Status: SHIPPED | OUTPATIENT
Start: 2024-08-19

## 2024-08-26 ENCOUNTER — OFFICE VISIT (OUTPATIENT)
Dept: SLEEP MEDICINE | Facility: HOSPITAL | Age: 39
End: 2024-08-26
Payer: MEDICAID

## 2024-08-26 VITALS — OXYGEN SATURATION: 95 % | HEART RATE: 66 BPM | BODY MASS INDEX: 28.34 KG/M2 | HEIGHT: 68 IN | WEIGHT: 187 LBS

## 2024-08-26 DIAGNOSIS — I42.0 DILATED CARDIOMYOPATHY: ICD-10-CM

## 2024-08-26 DIAGNOSIS — G47.33 OSA (OBSTRUCTIVE SLEEP APNEA): ICD-10-CM

## 2024-08-26 DIAGNOSIS — I61.9 HEMORRHAGIC STROKE: ICD-10-CM

## 2024-08-26 DIAGNOSIS — E66.09 CLASS 1 OBESITY DUE TO EXCESS CALORIES WITHOUT SERIOUS COMORBIDITY WITH BODY MASS INDEX (BMI) OF 33.0 TO 33.9 IN ADULT: ICD-10-CM

## 2024-08-26 DIAGNOSIS — I50.21 ACUTE HFREF (HEART FAILURE WITH REDUCED EJECTION FRACTION): ICD-10-CM

## 2024-08-26 DIAGNOSIS — R94.31 ABNORMAL ECG: ICD-10-CM

## 2024-08-26 DIAGNOSIS — R53.83 FATIGUE, UNSPECIFIED TYPE: Primary | ICD-10-CM

## 2024-08-26 PROCEDURE — G0463 HOSPITAL OUTPT CLINIC VISIT: HCPCS

## 2024-08-26 PROCEDURE — 99204 OFFICE O/P NEW MOD 45 MIN: CPT | Performed by: PSYCHIATRY & NEUROLOGY

## 2024-08-26 NOTE — PROGRESS NOTES
Chief Complaint  Primary hypertension (Go over medications and bp readings)    Candace Cooper Jr. presents to [facility]  History of Present Illness    History of Present Illness  The patient presents for evaluation of blood pressure. He is accompanied by mom and dad    He is seeking an order for a new blood pressure monitor. Previously, he was on 8 mg of doxazosin, which was later decreased to 4 mg upon refill. The dosage was reduced by half, as his blood pressure was decreased during a recent check.   Consequently, he was previously put on carvedilol. Clonidine was prescribed previously for blood pressure spikes, but he has not needed to take it. He denies experiencing dizziness, but occasionally experiences fatigue depending on his activity level or prolonged sitting. Overall, he feels well. His current medications include spironolactone 25 mg daily and amlodipine in the morning. He takes doxazosin 8 mg twice daily, had blood pressure spikes at lower dose. Hydrochlorothiazide was discontinued. He monitors his blood pressure twice daily, once in the morning between 7:00 AM and 8:00 AM and again between 8:00 and 9:00 PM. His leg cramps have improved. He has adjusted to drinking Gatorade, and his bloating has improved. He is taking iron pills and reports normal bowel movements. He maintains good hydration.    He occasionally experiences mood swings and feelings of sadness. He is considering mental health therapy and physical therapy until his return to [facility] on 09/01/2024. He attends [facility] sessions 3 times a week and enjoys yard work. His sleep is satisfactory, getting 7 to 7.5 hours of sleep per night. He finds being alone stressful at times. He takes trazodone at night for sleep, seems to work well.    He experiences back pain. He takes tizanidine for muscle spasms. Seems to help.     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     "    Objective   Vital Signs:  /78   Pulse 67   Temp 97.3 °F (36.3 °C) (Temporal)   Resp 12   Ht 172.7 cm (68\")   Wt 84.8 kg (187 lb)   SpO2 98%   BMI 28.43 kg/m²   Estimated body mass index is 28.43 kg/m² as calculated from the following:    Height as of this encounter: 172.7 cm (68\").    Weight as of this encounter: 84.8 kg (187 lb).               Physical Exam  Vitals and nursing note reviewed.   Constitutional:       General: He is not in acute distress.     Appearance: He is well-developed. He is not ill-appearing or diaphoretic.   HENT:      Head: Normocephalic and atraumatic.   Eyes:      General: No scleral icterus.        Right eye: No discharge.         Left eye: No discharge.      Conjunctiva/sclera: Conjunctivae normal.   Cardiovascular:      Rate and Rhythm: Normal rate and regular rhythm.      Heart sounds: Normal heart sounds.   Pulmonary:      Effort: Pulmonary effort is normal.      Breath sounds: Normal breath sounds.   Abdominal:      General: Bowel sounds are normal.      Palpations: Abdomen is soft.      Tenderness: There is no abdominal tenderness.   Musculoskeletal:      Comments: W/C   Skin:     General: Skin is warm and dry.   Neurological:      Mental Status: He is alert and oriented to person, place, and time. Mental status is at baseline.   Psychiatric:         Mood and Affect: Mood normal.         Behavior: Behavior normal.          Physical Exam  Vital Signs  Vitals show a blood pressure of 120/78.     Result Review :            Results                  Assessment and Plan     Diagnoses and all orders for this visit:    1. Primary hypertension (Primary)  -     doxazosin (CARDURA) 8 MG tablet; Take 1 tablet by mouth 2 (Two) Times a Day.  Dispense: 180 tablet; Refill: 1  -     CBC (No Diff); Future  -     Basic Metabolic Panel; Future    2. Anemia, unspecified type  -     ferrous gluconate (FERGON) 324 MG tablet; Take 1 tablet by mouth Daily With Breakfast. Take with vitamin C " " Dispense: 90 tablet; Refill: 0  -     CBC (No Diff); Future  -     Iron Profile; Future    3. Elevated serum creatinine  -     Basic Metabolic Panel; Future        Assessment & Plan  1. Hypertension.  His blood pressure readings are satisfactory, with only a few high diastolic readings. The target diastolic reading is closer to 70. The current medication regimen will be maintained, with doxazosin 8 mg twice daily. His blood pressure will be monitored in the afternoon and a few hours after taking both medications in the morning. A blood pressure cuff will be ordered tomorrow. If his blood pressure does not remain at the target range, adjustments to his medication regimen will be considered.    2. Leg cramps.  The cramping is likely due to dehydration and diuretic use. If the cramps resolve, Gatorade daily is not necessary. Once his iron pills are completed, a multivitamin should be started.    3. Mood swings.  Counseling was recommended. He was advised to consider what he enjoys doing and discussed goal setting and what he would like to do with future now that he is not so much in \"survival mode\"     4. Muscle spasms.  Massage and physical therapy were recommended. He was advised to moderate his Gatorade intake to no more than twice a week and maintain hydration.    5.  Elevated creatinine   Has been improving with better hydration and decreasing diuretics and improving hypotension.  Continue to monitor.     Follow-up  A follow-up visit is scheduled in 3 months for iron levels recheck.            Follow Up     No follow-ups on file.  Patient was given instructions and counseling regarding his condition or for health maintenance advice. Please see specific information pulled into the AVS if appropriate.    Patient or patient representative verbalized consent for the use of Ambient Listening during the visit with  MINESH Gupta for chart documentation. 8/26/2024  07:20 EDT      "

## 2024-08-26 NOTE — PROGRESS NOTES
Reason for Consultation: Sleep apnea        Patient Care Team:  Nancy Mendez APRN as PCP - General (Nurse Practitioner)  Ramos Mehta MD, MPH as Consulting Physician (Sleep Medicine)      History of present illness:    Thank you for asking me to see your patient.  The patient is a 39 y.o. male has a history of sonorous snoring but recently had a hemorrhagic stroke in the right hemisphere and concern that he might now have worsening apnea or at least another risk factor for his stroke.  He is at the visit with his wife and they provide collateral history that is concordant.  The patient's is able to communicate without apparent difficulty.  He has hypertension hyperlipidemia and dilated cardiomyopathy with reduced ejection fraction.  He had to have craniotomy to relieve pressure from the hemorrhage on the right hemisphere.  Habitual bedtime is 9:30 PM and gets up at 5:30 AM estimates he slept 7 and half hours and feels reasonably well rested in the morning.  He does wet the bed now he sometimes sleeps better when he is away from home.  He has difficulty staying asleep.  And he has pain when he sleeping.  He snores loudly and does wake up with morning headaches sometimes.    Ingalls: 3    Data Reviewed: Reviewed his medical chart, neuroimaging and sleep questionnaire.      PMH:  Past Medical History:   Diagnosis Date    Allergic     Stroke           Allergies:  Latex and Other     Medication Review:   Current Outpatient Medications on File Prior to Visit   Medication Sig Dispense Refill    acetaminophen (TYLENOL) 325 MG tablet Take 2 tablets by mouth Every 6 (Six) Hours As Needed for Mild Pain.      amLODIPine (NORVASC) 10 MG tablet Take 1 tablet by mouth Daily. 90 tablet 1    Blood Pressure kit Use as directed. Check blood pressure daily 1 each 0    carvedilol (COREG) 12.5 MG tablet Take 1 tablet by mouth 2 (Two) Times a Day With Meals. 180 tablet 1    cloNIDine (CATAPRES) 0.2 MG tablet Take 1 tablet by  "mouth Every 6 (Six) Hours. 3 Tabs po q6hrs      doxazosin (CARDURA) 8 MG tablet Take 1 tablet by mouth 2 (Two) Times a Day. 180 tablet 1    DULoxetine HCl 40 MG capsule delayed-release particles Take 1 capsule by mouth Daily. 90 capsule 1    ferrous gluconate (FERGON) 324 MG tablet Take 1 tablet by mouth Daily With Breakfast. Take with vitamin C 90 tablet 0    gabapentin (NEURONTIN) 100 MG capsule Take 1 capsule by mouth 2 (Two) Times a Day.      spironolactone (ALDACTONE) 25 MG tablet Take 1 tablet by mouth Daily. 90 tablet 1    tiZANidine (ZANAFLEX) 4 MG tablet Take 1 tablet by mouth 3 (Three) Times a Day. 180 tablet 1    traZODone (DESYREL) 50 MG tablet Take 1-2 tablets by mouth Every Night. 180 tablet 1    vitamin C (ASCORBIC ACID) 250 MG tablet Take 1 tablet by mouth Daily. Take with iron 90 tablet 0    [DISCONTINUED] zolpidem (AMBIEN) 5 MG tablet Take 1 tablet by mouth.       No current facility-administered medications on file prior to visit.         Vital Signs:    Vitals:    08/26/24 1119   Pulse: 66   SpO2: 95%   Weight: 84.8 kg (187 lb)   Height: 172.7 cm (68\")        Body mass index is 28.43 kg/m².  Neck Circumference: 16 inches  .BMIFOLLOWUP         Physical Exam:    Constitutional:  Well developed 39 y.o. male that appears in no apparent distress.  Awake & oriented times 3.  Normal mood with normal recent and remote memory and normal judgement.  Eyes:  Conjunctivae normal.  Oropharynx: Moist mucous membranes without exudate and Mallampati 4 with macroglossia  Neck: Trachea midline  Respiratory: Effort is not labored  Cardiovascular: Radial pulse regular  Musculoskeletal: Gait appears normal, no digital clubbing evident, no pre-tibial edema        Impression:   Encounter Diagnoses   Name Primary?    Fatigue, unspecified type Yes    Acute HFrEF (heart failure with reduced ejection fraction)     Abnormal ECG     Dilated cardiomyopathy     Class 1 obesity due to excess calories without serious comorbidity " with body mass index (BMI) of 33.0 to 33.9 in adult     FAUSTINO (obstructive sleep apnea)     Hemorrhagic stroke      Patient's BMI is Body mass index is 28.43 kg/m².    I think on balance given his cardiac problems and his risk for central sleep apnea after stroke that we should do an in lab split-night polysomnogram.    Plan:    In lab split-night polysomnogram because I am worried about the possibility of central sleep apnea after the right hemorrhagic.    The patient should practice good sleep hygiene measures.      Weight loss might be beneficial in this patient who has a Body mass index is 28.43 kg/m².      Pathophysiology of FAUSTINO described to the patient.  Cardiovascular complications of untreated FAUSTINO also reviewed.      The patient was cautioned about the dangers of drowsy driving.    Robi Mehta MD  Sleep Medicine  08/26/24  11:51 EDT

## 2024-09-19 ENCOUNTER — TELEPHONE (OUTPATIENT)
Dept: FAMILY MEDICINE CLINIC | Facility: CLINIC | Age: 39
End: 2024-09-19
Payer: MEDICAID

## 2024-09-19 DIAGNOSIS — Z86.73 HISTORY OF STROKE: Primary | ICD-10-CM

## 2024-09-30 ENCOUNTER — HOSPITAL ENCOUNTER (OUTPATIENT)
Dept: SLEEP MEDICINE | Facility: HOSPITAL | Age: 39
Discharge: HOME | End: 2024-09-30
Admitting: PSYCHIATRY & NEUROLOGY
Payer: MEDICAID

## 2024-09-30 DIAGNOSIS — I61.9 HEMORRHAGIC STROKE: ICD-10-CM

## 2024-09-30 DIAGNOSIS — E66.09 CLASS 1 OBESITY DUE TO EXCESS CALORIES WITHOUT SERIOUS COMORBIDITY WITH BODY MASS INDEX (BMI) OF 33.0 TO 33.9 IN ADULT: ICD-10-CM

## 2024-09-30 DIAGNOSIS — I50.21 ACUTE HFREF (HEART FAILURE WITH REDUCED EJECTION FRACTION): ICD-10-CM

## 2024-09-30 DIAGNOSIS — E66.811 CLASS 1 OBESITY DUE TO EXCESS CALORIES WITHOUT SERIOUS COMORBIDITY WITH BODY MASS INDEX (BMI) OF 33.0 TO 33.9 IN ADULT: ICD-10-CM

## 2024-09-30 DIAGNOSIS — I42.0 DILATED CARDIOMYOPATHY: ICD-10-CM

## 2024-09-30 DIAGNOSIS — R94.31 ABNORMAL ECG: ICD-10-CM

## 2024-09-30 DIAGNOSIS — G47.33 OSA (OBSTRUCTIVE SLEEP APNEA): ICD-10-CM

## 2024-09-30 PROCEDURE — 95810 POLYSOM 6/> YRS 4/> PARAM: CPT

## 2024-10-01 ENCOUNTER — TELEPHONE (OUTPATIENT)
Dept: SLEEP MEDICINE | Facility: HOSPITAL | Age: 39
End: 2024-10-01
Payer: MEDICAID

## 2024-10-01 DIAGNOSIS — I50.21 ACUTE HFREF (HEART FAILURE WITH REDUCED EJECTION FRACTION): Primary | ICD-10-CM

## 2024-10-01 DIAGNOSIS — E66.09 CLASS 1 OBESITY DUE TO EXCESS CALORIES WITHOUT SERIOUS COMORBIDITY WITH BODY MASS INDEX (BMI) OF 33.0 TO 33.9 IN ADULT: ICD-10-CM

## 2024-10-01 DIAGNOSIS — I42.0 DILATED CARDIOMYOPATHY: ICD-10-CM

## 2024-10-01 DIAGNOSIS — E66.811 CLASS 1 OBESITY DUE TO EXCESS CALORIES WITHOUT SERIOUS COMORBIDITY WITH BODY MASS INDEX (BMI) OF 33.0 TO 33.9 IN ADULT: ICD-10-CM

## 2024-10-01 DIAGNOSIS — G47.33 OSA (OBSTRUCTIVE SLEEP APNEA): ICD-10-CM

## 2024-10-01 DIAGNOSIS — I61.9 HEMORRHAGIC STROKE: ICD-10-CM

## 2024-10-01 DIAGNOSIS — R53.83 FATIGUE, UNSPECIFIED TYPE: ICD-10-CM

## 2024-10-01 PROCEDURE — 95810 POLYSOM 6/> YRS 4/> PARAM: CPT | Performed by: PSYCHIATRY & NEUROLOGY

## 2024-10-23 ENCOUNTER — TELEPHONE (OUTPATIENT)
Dept: FAMILY MEDICINE CLINIC | Facility: CLINIC | Age: 39
End: 2024-10-23

## 2024-10-23 NOTE — TELEPHONE ENCOUNTER
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  MEDICAID WILL NOT ACCEPT THE SIGNATURE OF A APRN OR A NPT.  NEEDS TO BE A MD.    ANY QUESTIONS PLEASE CALL EDITH SHE WILL BE GLAD TO ASSIST.

## 2024-11-06 ENCOUNTER — TELEPHONE (OUTPATIENT)
Dept: CARDIOLOGY | Facility: CLINIC | Age: 39
End: 2024-11-06

## 2024-11-06 NOTE — TELEPHONE ENCOUNTER
Caller: JEFF FARRELL    Relationship to patient: Mother    Best call back number: [phone]     Chief complaint: FU FROM SLEEP STUDY    Type of visit: FU    Requested date: ASAP     Additional notes: PT HAD SLEEP STUDY DONE AND THEY WAS ADVISED TO GET IN AND SEE DR VICTORIA. PT WAS LAST SEEN IN 2023, PLS CALL TO SCHEDULE.

## 2024-11-11 ENCOUNTER — OFFICE VISIT (OUTPATIENT)
Dept: FAMILY MEDICINE CLINIC | Facility: CLINIC | Age: 39
End: 2024-11-11
Payer: MEDICAID

## 2024-11-11 VITALS
DIASTOLIC BLOOD PRESSURE: 82 MMHG | TEMPERATURE: 97.1 F | BODY MASS INDEX: 28.34 KG/M2 | OXYGEN SATURATION: 100 % | HEART RATE: 81 BPM | RESPIRATION RATE: 12 BRPM | HEIGHT: 68 IN | SYSTOLIC BLOOD PRESSURE: 122 MMHG | WEIGHT: 187 LBS

## 2024-11-11 DIAGNOSIS — M79.2 NERVE PAIN: Chronic | ICD-10-CM

## 2024-11-11 DIAGNOSIS — I69.398: Chronic | ICD-10-CM

## 2024-11-11 DIAGNOSIS — F51.04 PSYCHOPHYSIOLOGICAL INSOMNIA: Chronic | ICD-10-CM

## 2024-11-11 DIAGNOSIS — G47.37: Chronic | ICD-10-CM

## 2024-11-11 DIAGNOSIS — I10 PRIMARY HYPERTENSION: Primary | Chronic | ICD-10-CM

## 2024-11-11 DIAGNOSIS — I61.9 HEMORRHAGIC STROKE: Chronic | ICD-10-CM

## 2024-11-11 PROCEDURE — 1159F MED LIST DOCD IN RCRD: CPT | Performed by: NURSE PRACTITIONER

## 2024-11-11 PROCEDURE — 1125F AMNT PAIN NOTED PAIN PRSNT: CPT | Performed by: NURSE PRACTITIONER

## 2024-11-11 PROCEDURE — 3074F SYST BP LT 130 MM HG: CPT | Performed by: NURSE PRACTITIONER

## 2024-11-11 PROCEDURE — 3079F DIAST BP 80-89 MM HG: CPT | Performed by: NURSE PRACTITIONER

## 2024-11-11 PROCEDURE — 99214 OFFICE O/P EST MOD 30 MIN: CPT | Performed by: NURSE PRACTITIONER

## 2024-11-11 PROCEDURE — 1160F RVW MEDS BY RX/DR IN RCRD: CPT | Performed by: NURSE PRACTITIONER

## 2024-11-11 RX ORDER — BACLOFEN 10 MG/1
10 TABLET ORAL 3 TIMES DAILY
COMMUNITY
Start: 2024-06-11 | End: 2024-12-06

## 2024-11-11 RX ORDER — GABAPENTIN 300 MG/1
300 CAPSULE ORAL NIGHTLY
COMMUNITY

## 2024-11-11 RX ORDER — AMLODIPINE BESYLATE 10 MG/1
10 TABLET ORAL DAILY
Qty: 90 TABLET | Refills: 1 | Status: SHIPPED | OUTPATIENT
Start: 2024-11-11

## 2024-11-11 RX ORDER — DOXAZOSIN 8 MG/1
8 TABLET ORAL 2 TIMES DAILY
Qty: 180 TABLET | Refills: 1 | Status: SHIPPED | OUTPATIENT
Start: 2024-11-11

## 2024-11-11 RX ORDER — SPIRONOLACTONE 25 MG/1
25 TABLET ORAL DAILY
Qty: 90 TABLET | Refills: 1 | Status: SHIPPED | OUTPATIENT
Start: 2024-11-11

## 2024-11-11 RX ORDER — TRAZODONE HYDROCHLORIDE 50 MG/1
50-100 TABLET, FILM COATED ORAL NIGHTLY
Qty: 180 TABLET | Refills: 1 | Status: SHIPPED | OUTPATIENT
Start: 2024-11-11

## 2024-11-11 RX ORDER — CARVEDILOL 12.5 MG/1
12.5 TABLET ORAL 2 TIMES DAILY WITH MEALS
Qty: 180 TABLET | Refills: 1 | Status: SHIPPED | OUTPATIENT
Start: 2024-11-11

## 2024-11-11 RX ORDER — DULOXETINE 40 MG/1
40 CAPSULE, DELAYED RELEASE ORAL DAILY
Qty: 90 CAPSULE | Refills: 1 | Status: SHIPPED | OUTPATIENT
Start: 2024-11-11

## 2024-11-11 NOTE — PROGRESS NOTES
"Chief Complaint  Annual Exam    Subjective        John Cooper Jr. presents to [facility]  History of Present Illness    History of Present Illness  The patient presents for a follow-up visit. He is accompanied by his mother.    He reports no issues with his blood pressure, which has been stable. He is currently undergoing physical therapy five days a week, which he finds beneficial. His mobility has improved, although he still struggles with left hand movement. He can now raise his arms, a significant improvement.    His mother expresses concern about his weight gain and inquires about potential weight loss medications.    He has been diagnosed with sleep apnea and uses a CPAP machine at home for short durations. He has switched to a different CPAP machine due to headaches caused by the first one. He finds the CPAP machine uncomfortable due to the presence of a hand brace and foot boot. He is currently taking trazodone for sleep, which he finds helpful. He breathes through his nose and does not have a runny nose or feel congested.    He has been experiencing whistling from lungs/nose? for about a week but has no history of asthma. No cough, dyspnea, recent illness    He reports no swelling in his legs. He is not experiencing chest pain or heart palpitations. He reports no gastrointestinal issues such as nausea, vomiting, diarrhea, or constipation.     He has an appointment with his cardiologist, Dr. Ga, on 11/19/2024.    FAMILY HISTORY  He denies any family history of thyroid medullary carcinoma or neuroendocrine tumors.       Objective   Vital Signs:  /82   Pulse 81   Temp 97.1 °F (36.2 °C) (Infrared)   Resp 12   Ht 172.7 cm (68\")   Wt 84.8 kg (187 lb)   SpO2 100%   BMI 28.43 kg/m²   Estimated body mass index is 28.43 kg/m² as calculated from the following:    Height as of this encounter: 172.7 cm (68\").    Weight as of this encounter: 84.8 kg (187 lb).         "       Physical Exam  Vitals and nursing note reviewed.   Constitutional:       General: He is not in acute distress.     Appearance: He is well-developed. He is not ill-appearing or diaphoretic.   HENT:      Head: Normocephalic and atraumatic.   Eyes:      General:         Right eye: No discharge.         Left eye: No discharge.      Conjunctiva/sclera: Conjunctivae normal.   Cardiovascular:      Rate and Rhythm: Normal rate and regular rhythm.   Pulmonary:      Effort: Pulmonary effort is normal.      Comments: Does not seem to be breathing as deeply and when asked to do so, breathing seemed a little disordered-whistling sound noted intermittently-no distinct wheeze  Abdominal:      General: Bowel sounds are normal. There is no distension.      Palpations: Abdomen is soft.      Tenderness: There is no abdominal tenderness.   Musculoskeletal:      Comments: Wheel chair  Immobility left arm   Skin:     General: Skin is warm and dry.   Neurological:      Mental Status: He is alert and oriented to person, place, and time.   Psychiatric:         Mood and Affect: Mood normal.         Behavior: Behavior normal.          Physical Exam  Vital Signs  Pt not been on scale-so unclear what his weight is     Result Review :            Results  Testing  Sleep study indicates mild sleep apnea                Assessment and Plan     Diagnoses and all orders for this visit:    1. Primary hypertension (Primary)  -     spironolactone (ALDACTONE) 25 MG tablet; Take 1 tablet by mouth Daily.  Dispense: 90 tablet; Refill: 1  -     doxazosin (CARDURA) 8 MG tablet; Take 1 tablet by mouth 2 (Two) Times a Day.  Dispense: 180 tablet; Refill: 1  -     carvedilol (COREG) 12.5 MG tablet; Take 1 tablet by mouth 2 (Two) Times a Day With Meals.  Dispense: 180 tablet; Refill: 1  -     amLODIPine (NORVASC) 10 MG tablet; Take 1 tablet by mouth Daily.  Dispense: 90 tablet; Refill: 1    2. Hemorrhagic stroke    3. Central sleep apnea secondary to  cerebrovascular accident (CVA)    4. Psychophysiological insomnia  -     traZODone (DESYREL) 50 MG tablet; Take 1-2 tablets by mouth Every Night.  Dispense: 180 tablet; Refill: 1    5. Nerve pain  -     DULoxetine HCl 40 MG capsule delayed-release particles; Take 1 capsule by mouth Daily.  Dispense: 90 capsule; Refill: 1        Assessment & Plan  1. Hypertension-continue current meds  His blood pressure readings are within the normal range. He should continue his current blood pressure medication. All prescriptions will be refilled. Mom monitors at home regularly    2. Weight Gain.  The use of metformin for weight loss was discussed, can aid in managing insulin resistance. He was advised to weigh himself at home regularly. The use of a CPAP machine could potentially assist in weight loss. He should gradually increase the duration of CPAP usage.     3. Disordered Breathing.  His breathing pattern appears disordered, which could be due to prolonged sitting affecting lung function. He was advised to see his cardiologist, Dr. Ga, on November 19, 2024. Would  be concerned for any worsening HF first. Immobility is a concern as well.  Likely may benefit from referral to a pulmonologist. Weight mgmt also would be helpful    4. Central Sleep Apnea.  He has been using a CPAP machine at home. He was advised to gradually increase the duration of CPAP usage. Increasing the dosage of trazodone may help improve sleep quality while getting accustomed to sleep apnea.              Follow Up     No follow-ups on file.  Patient was given instructions and counseling regarding his condition or for health maintenance advice. Please see specific information pulled into the AVS if appropriate.    Patient or patient representative verbalized consent for the use of Ambient Listening during the visit with  MINESH Gupta for chart documentation. 11/11/2024  18:02 EST

## 2024-11-19 ENCOUNTER — OFFICE VISIT (OUTPATIENT)
Dept: CARDIOLOGY | Facility: CLINIC | Age: 39
End: 2024-11-19
Payer: MEDICAID

## 2024-11-19 VITALS
HEIGHT: 68 IN | BODY MASS INDEX: 31.83 KG/M2 | WEIGHT: 210 LBS | DIASTOLIC BLOOD PRESSURE: 86 MMHG | SYSTOLIC BLOOD PRESSURE: 120 MMHG | HEART RATE: 60 BPM

## 2024-11-19 DIAGNOSIS — G47.33 OSA (OBSTRUCTIVE SLEEP APNEA): ICD-10-CM

## 2024-11-19 DIAGNOSIS — I63.9 CEREBROVASCULAR ACCIDENT (CVA), UNSPECIFIED MECHANISM: ICD-10-CM

## 2024-11-19 DIAGNOSIS — R06.89 NOISY BREATHING: Primary | ICD-10-CM

## 2024-11-19 DIAGNOSIS — I42.0 DILATED CARDIOMYOPATHY: Primary | ICD-10-CM

## 2024-11-19 DIAGNOSIS — Z00.00 ROUTINE GENERAL MEDICAL EXAMINATION AT A HEALTH CARE FACILITY: ICD-10-CM

## 2024-11-19 DIAGNOSIS — Z12.5 SCREENING FOR PROSTATE CANCER: ICD-10-CM

## 2024-11-19 DIAGNOSIS — I10 PRIMARY HYPERTENSION: ICD-10-CM

## 2024-11-19 PROCEDURE — 3074F SYST BP LT 130 MM HG: CPT | Performed by: INTERNAL MEDICINE

## 2024-11-19 PROCEDURE — 93000 ELECTROCARDIOGRAM COMPLETE: CPT | Performed by: INTERNAL MEDICINE

## 2024-11-19 PROCEDURE — 3079F DIAST BP 80-89 MM HG: CPT | Performed by: INTERNAL MEDICINE

## 2024-11-19 PROCEDURE — 99214 OFFICE O/P EST MOD 30 MIN: CPT | Performed by: INTERNAL MEDICINE

## 2024-11-19 PROCEDURE — 1159F MED LIST DOCD IN RCRD: CPT | Performed by: INTERNAL MEDICINE

## 2024-11-19 PROCEDURE — 1160F RVW MEDS BY RX/DR IN RCRD: CPT | Performed by: INTERNAL MEDICINE

## 2024-11-19 RX ORDER — AMLODIPINE BESYLATE 5 MG/1
5 TABLET ORAL NIGHTLY
Qty: 90 TABLET | Refills: 3 | Status: SHIPPED | OUTPATIENT
Start: 2024-11-19 | End: 2024-11-20 | Stop reason: SDUPTHER

## 2024-11-19 RX ORDER — SACUBITRIL AND VALSARTAN 24; 26 MG/1; MG/1
1 TABLET, FILM COATED ORAL 2 TIMES DAILY
Qty: 180 TABLET | Refills: 3 | Status: SHIPPED | OUTPATIENT
Start: 2024-11-19

## 2024-11-19 NOTE — PROGRESS NOTES
Date of Office Visit: 2024  Encounter Provider: Aleida Ga MD  Place of Service: [facility]  Patient Name: John Cooper Jr.  :1985      Patient ID:  John Cooper Jr. is a 39 y.o. male is here for  followup for hypertension, stroke.        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     There is a family history of hypertension, cancer and nonischemic cardiomyopathy.  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    Stress nuclear perfusion study done 2023 showed ejection fraction of 34% with left ventricular dilation and moderate hypokinesis, no evidence of ischemia.  Echocardiogram done 2023 showed ejection action of 37% with moderate left ventricular dilation, moderate concentric left ventricle hypertrophy, normal diastolic function, normal RVSP.    He presented with unsteady gait 2023 and developed left-sided weakness and jumbled speech and was brought to the emergency department at that time.  He has left hemiplegia with spasticity after a right basal ganglia intracranial hemorrhage 2023 requiring decompression evacuation 2023. He required tracheostomy and PEG tube placement.  He developed a severe tongue ulcer. He underwent elective right cranioplasty 2024.  He has impaired mobility and impaired ADLs.  He lives with his mom and dad.  He sees Dr. Figueroa from [facility] in physical medicine.  He does therapy every day.  He has been able to take a few steps.  He denies chest pain or difficulty breathing.  His blood pressures been well-controlled.  He does not feels heart racing or skipping.    He has sleep apnea and is being followed by sleep  medicine.Laboratory values on 2/20/2024 show normal CMP and magnesium.    Past Medical History:   Diagnosis Date    Allergic     Stroke          Past Surgical History:   Procedure Laterality Date    ANKLE SURGERY Right 2015    MANDIBLE SURGERY Right 2019       Current Outpatient Medications on File Prior to Visit   Medication Sig Dispense Refill    acetaminophen (TYLENOL) 325 MG tablet Take 2 tablets by mouth Every 6 (Six) Hours As Needed for Mild Pain.      baclofen (LIORESAL) 10 MG tablet Take 1 tablet by mouth 3 (Three) Times a Day.      Blood Pressure kit Use as directed. Check blood pressure daily 1 each 0    carvedilol (COREG) 12.5 MG tablet Take 1 tablet by mouth 2 (Two) Times a Day With Meals. 180 tablet 1    doxazosin (CARDURA) 8 MG tablet Take 1 tablet by mouth 2 (Two) Times a Day. 180 tablet 1    ferrous gluconate (FERGON) 324 MG tablet Take 1 tablet by mouth Daily With Breakfast. Take with vitamin C 90 tablet 0    gabapentin (NEURONTIN) 300 MG capsule Take 1 capsule by mouth Every Night.      spironolactone (ALDACTONE) 25 MG tablet Take 1 tablet by mouth Daily. 90 tablet 1    tiZANidine (ZANAFLEX) 4 MG tablet Take 1 tablet by mouth 3 (Three) Times a Day. 180 tablet 1    traZODone (DESYREL) 50 MG tablet Take 1-2 tablets by mouth Every Night. 180 tablet 1    vitamin C (ASCORBIC ACID) 250 MG tablet Take 1 tablet by mouth Daily. Take with iron 90 tablet 0    [DISCONTINUED] amLODIPine (NORVASC) 10 MG tablet Take 1 tablet by mouth Daily. 90 tablet 1    [DISCONTINUED] cloNIDine (CATAPRES) 0.2 MG tablet Take 1 tablet by mouth Every 6 (Six) Hours. 3 Tabs po q6hrs      [DISCONTINUED] DULoxetine HCl 40 MG capsule delayed-release particles Take 1 capsule by mouth Daily. 90 capsule 1     No current facility-administered medications on file prior to visit.       Social History     Socioeconomic History    Marital status: Single   Tobacco Use    Smoking status: Never     Passive exposure: Never    Smokeless tobacco:  "Never    Tobacco comments:     Social cigar smoker   Vaping Use    Vaping status: Never Used   Substance and Sexual Activity    Alcohol use: Yes     Comment: social    Drug use: Not Currently    Sexual activity: Defer             Procedures    ECG 12 Lead    Date/Time: 11/19/2024 11:27 AM  Performed by: Aleida Ga MD    Authorized by: Aleida Ga MD  Comparison: compared with previous ECG   Comparison to previous ECG: Upright T waves now  Rhythm: sinus rhythm  Other findings: poor R wave progression    Clinical impression: non-specific ECG            Objective:      Vitals:    11/19/24 1015   BP: 120/86   Pulse: 60   Weight: 95.3 kg (210 lb)   Height: 172.7 cm (68\")     Body mass index is 31.93 kg/m².    Vitals reviewed.   Constitutional:       General: Not in acute distress.     Appearance: Not diaphoretic.   Neck:      Vascular: No carotid bruit or JVD.      Comments: Upper airway noise  Pulmonary:      Effort: Pulmonary effort is normal.      Breath sounds: Normal breath sounds.   Cardiovascular:      Normal rate. Regular rhythm.      Murmurs: There is no murmur.      No gallop.  No rub.   Pulses:     Intact distal pulses.      Carotid: 2+ bilaterally.     Radial: 2+ bilaterally.     Dorsalis pedis: 2+ bilaterally.     Posterior tibial: 2+ bilaterally.  Edema:     Peripheral edema absent.   Neurological:      Cranial Nerves: No cranial nerve deficit.         Lab Review:       Assessment:      Diagnosis Plan   1. Dilated cardiomyopathy  Adult Transthoracic Echo Complete W/ Cont if Necessary Per Protocol      2. Primary hypertension  ECG 12 Lead    amLODIPine (NORVASC) 5 MG tablet      3. FAUSTINO (obstructive sleep apnea)        4. Cerebrovascular accident (CVA), unspecified mechanism            Hemorrhagic stroke 11/2023, status postcraniotomy with mild left-sided hemiaplasia.  Has daily therapy and is getting Botox for spasticity.    Nonischemic cardiomyopathy likely due to hypertension.  "   Hypertension, BP goal <120/80.  Now well-controlled.  Hyperlipidemia, he is on no lipid medications.  Overweight  FAUSTINO, on CPAP.  Family history of cardiomyopathy  History of tracheostomy, may have possible stenosis, recommend that he see ENT if he has not.     Plan:       He needs his lipids checked and treated if they are high.  See Isabel or myself in 8 weeks with an echocardiogram the same day.  Decrease amlodipine to 5 mg nightly and start Entresto 24/26 twice daily.  I spoke with his family about this.

## 2024-11-20 ENCOUNTER — TELEPHONE (OUTPATIENT)
Dept: FAMILY MEDICINE CLINIC | Facility: CLINIC | Age: 39
End: 2024-11-20

## 2024-11-20 DIAGNOSIS — I10 PRIMARY HYPERTENSION: ICD-10-CM

## 2024-11-20 RX ORDER — AMLODIPINE BESYLATE 5 MG/1
5 TABLET ORAL NIGHTLY
Qty: 90 TABLET | Refills: 3 | Status: SHIPPED | OUTPATIENT
Start: 2024-11-20

## 2024-11-20 NOTE — TELEPHONE ENCOUNTER
Caller: JEFF FARRELL    Relationship: Mother    Best call back number: [phone]     What orders are you requesting (i.e. lab or imaging): LIGHT WEIGHT WHEELCHAIR    Where will you receive your lab/imaging services: [facility]    Additional notes: MOTHER FOLLOWING UP ON WHEELCHAIR ORDER THAT WAS REQUESTED ON LIST OFFICE VISIT.  PLEASE CALL TO FOLLOW UP IF NEEDED.

## 2024-12-10 ENCOUNTER — TELEPHONE (OUTPATIENT)
Dept: FAMILY MEDICINE CLINIC | Facility: CLINIC | Age: 39
End: 2024-12-10

## 2024-12-10 NOTE — TELEPHONE ENCOUNTER
Caller: JEFF FARRELL    Relationship: Mother    Best call back number: [phone]     What is the best time to reach you: ANY    Who are you requesting to speak with (clinical staff, provider,  specific staff member): CANDY AQUINO OR MA        What was the call regarding: THE WHEEL CHAIR WILL NEED A PRIOR AUTHORIZATION DUE TO PATIENT NOW HAVING DIFFERENT INSURANCE.      ALSO, NEEDING A DRGalen STATEMENT THAT SAYS THAT PATIENT HAD A STROKE - BRAIN BLEED, AND IS NOT ABLE TO WORK FOR THE MOMENT DUE TO THIS.

## 2024-12-11 NOTE — TELEPHONE ENCOUNTER
Spoke with pt mother yesterday pt is needing latest office visit note stating somewhere that he is wheelchair bound, has had a stroke and head injury

## 2024-12-19 ENCOUNTER — TELEPHONE (OUTPATIENT)
Age: 39
End: 2024-12-19

## 2024-12-19 NOTE — TELEPHONE ENCOUNTER
Insurance not valid. I called pharmacy and got the BIN [id number] PNC MOHMKP, GRP [id number] ID [id number]    Maybe this insurance starts Jan 1, 2025? I faxed info back to pharmacy letting them know I cannot do PA w/o correct or active insurance.    Curt contreras  12/19/24

## 2024-12-20 NOTE — TELEPHONE ENCOUNTER
Spoke to pt mother, states that pt stroke team told pt mother that pt PCP would have to write disability letter.

## 2024-12-26 ENCOUNTER — TELEPHONE (OUTPATIENT)
Dept: CARDIOLOGY | Facility: CLINIC | Age: 39
End: 2024-12-26
Payer: COMMERCIAL

## 2024-12-26 NOTE — TELEPHONE ENCOUNTER
Patient's mom called into office to reschedule echo and appointment that was scheduled for 1/16/25. We are currently out of network. Patient's mom said he will be getting a new [employer] plan that they say Dr. Ga is in network. Spoke to pre cert and they said it would be best if they call with the new plan information at the first of the year when the new plan takes effect and we will reschedule then. Also, patient is wanting to  samples of the Entresto 24-26 at the [place] office. They would also like paperwork on financial assistance to help with the cost of the Entresto. Can we set samples and paperwork aside for patient and call when they are ready to be picked up? Thank you.

## 2025-01-27 ENCOUNTER — TELEPHONE (OUTPATIENT)
Dept: CARDIOLOGY | Facility: CLINIC | Age: 40
End: 2025-01-27
Payer: COMMERCIAL

## 2025-01-28 ENCOUNTER — HOSPITAL ENCOUNTER (OUTPATIENT)
Dept: CARDIOLOGY | Facility: HOSPITAL | Age: 40
Discharge: HOME | End: 2025-01-28
Admitting: INTERNAL MEDICINE
Payer: COMMERCIAL

## 2025-01-28 ENCOUNTER — OFFICE VISIT (OUTPATIENT)
Dept: CARDIOLOGY | Facility: CLINIC | Age: 40
End: 2025-01-28
Payer: COMMERCIAL

## 2025-01-28 ENCOUNTER — TELEPHONE (OUTPATIENT)
Dept: CARDIOLOGY | Facility: CLINIC | Age: 40
End: 2025-01-28

## 2025-01-28 VITALS
OXYGEN SATURATION: 96 % | HEART RATE: 66 BPM | SYSTOLIC BLOOD PRESSURE: 125 MMHG | WEIGHT: 195 LBS | HEIGHT: 68 IN | DIASTOLIC BLOOD PRESSURE: 80 MMHG | BODY MASS INDEX: 29.55 KG/M2

## 2025-01-28 VITALS
BODY MASS INDEX: 31.83 KG/M2 | HEART RATE: 80 BPM | HEIGHT: 68 IN | WEIGHT: 210 LBS | SYSTOLIC BLOOD PRESSURE: 130 MMHG | DIASTOLIC BLOOD PRESSURE: 86 MMHG

## 2025-01-28 DIAGNOSIS — I10 PRIMARY HYPERTENSION: ICD-10-CM

## 2025-01-28 DIAGNOSIS — I42.8 NICM (NONISCHEMIC CARDIOMYOPATHY): Primary | ICD-10-CM

## 2025-01-28 DIAGNOSIS — I42.0 DILATED CARDIOMYOPATHY: ICD-10-CM

## 2025-01-28 DIAGNOSIS — E78.2 MIXED HYPERLIPIDEMIA: ICD-10-CM

## 2025-01-28 DIAGNOSIS — R94.31 ABNORMAL ECG: ICD-10-CM

## 2025-01-28 LAB
AORTIC ARCH: 2.8 CM
ASCENDING AORTA: 2.9 CM
AV MEAN PRESS GRAD SYS DOP V1V2: 3 MMHG
AV VMAX SYS DOP: 119 CM/SEC
BH CV ECHO LEFT VENTRICLE GLOBAL LONGITUDINAL STRAIN: -21.4 %
BH CV ECHO MEAS - ACS: 2.14 CM
BH CV ECHO MEAS - AO MAX PG: 5.7 MMHG
BH CV ECHO MEAS - AO ROOT DIAM: 3.8 CM
BH CV ECHO MEAS - AO V2 VTI: 17.3 CM
BH CV ECHO MEAS - AVA(I,D): 2.22 CM2
BH CV ECHO MEAS - EDV(CUBED): 132.7 ML
BH CV ECHO MEAS - EDV(MOD-SP2): 117 ML
BH CV ECHO MEAS - EDV(MOD-SP4): 122 ML
BH CV ECHO MEAS - EF(MOD-SP2): 59.8 %
BH CV ECHO MEAS - EF(MOD-SP4): 57.4 %
BH CV ECHO MEAS - ESV(CUBED): 40.7 ML
BH CV ECHO MEAS - ESV(MOD-SP2): 47 ML
BH CV ECHO MEAS - ESV(MOD-SP4): 52 ML
BH CV ECHO MEAS - FS: 32.5 %
BH CV ECHO MEAS - IVS/LVPW: 1 CM
BH CV ECHO MEAS - IVSD: 1.1 CM
BH CV ECHO MEAS - LAT PEAK E' VEL: 8.9 CM/SEC
BH CV ECHO MEAS - LV DIASTOLIC VOL/BSA (35-75): 58.5 CM2
BH CV ECHO MEAS - LV MASS(C)D: 213.9 GRAMS
BH CV ECHO MEAS - LV MAX PG: 1.34 MMHG
BH CV ECHO MEAS - LV MEAN PG: 1 MMHG
BH CV ECHO MEAS - LV SYSTOLIC VOL/BSA (12-30): 24.9 CM2
BH CV ECHO MEAS - LV V1 MAX: 57.8 CM/SEC
BH CV ECHO MEAS - LV V1 VTI: 11.4 CM
BH CV ECHO MEAS - LVIDD: 5.1 CM
BH CV ECHO MEAS - LVIDS: 3.4 CM
BH CV ECHO MEAS - LVOT AREA: 3.4 CM2
BH CV ECHO MEAS - LVOT DIAM: 2.07 CM
BH CV ECHO MEAS - LVPWD: 1.1 CM
BH CV ECHO MEAS - MED PEAK E' VEL: 11 CM/SEC
BH CV ECHO MEAS - MR MAX PG: 10.6 MMHG
BH CV ECHO MEAS - MR MAX VEL: 162.9 CM/SEC
BH CV ECHO MEAS - MV A DUR: 0.1 SEC
BH CV ECHO MEAS - MV A MAX VEL: 63.4 CM/SEC
BH CV ECHO MEAS - MV DEC SLOPE: 342.7 CM/SEC2
BH CV ECHO MEAS - MV DEC TIME: 0.18 SEC
BH CV ECHO MEAS - MV E MAX VEL: 60.4 CM/SEC
BH CV ECHO MEAS - MV E/A: 0.95
BH CV ECHO MEAS - MV MAX PG: 2.39 MMHG
BH CV ECHO MEAS - MV MEAN PG: 0.96 MMHG
BH CV ECHO MEAS - MV P1/2T: 61.5 MSEC
BH CV ECHO MEAS - MV V2 VTI: 26.1 CM
BH CV ECHO MEAS - MVA(P1/2T): 3.6 CM2
BH CV ECHO MEAS - MVA(VTI): 1.47 CM2
BH CV ECHO MEAS - PA ACC TIME: 0.11 SEC
BH CV ECHO MEAS - PA V2 MAX: 96.4 CM/SEC
BH CV ECHO MEAS - PULM A REVS DUR: 0.1 SEC
BH CV ECHO MEAS - PULM A REVS VEL: 24.4 CM/SEC
BH CV ECHO MEAS - PULM DIAS VEL: 32.5 CM/SEC
BH CV ECHO MEAS - PULM S/D: 1.17
BH CV ECHO MEAS - PULM SYS VEL: 37.9 CM/SEC
BH CV ECHO MEAS - QP/QS: 1.07
BH CV ECHO MEAS - RAP SYSTOLE: 3 MMHG
BH CV ECHO MEAS - RV MAX PG: 1.59 MMHG
BH CV ECHO MEAS - RV V1 MAX: 63 CM/SEC
BH CV ECHO MEAS - RV V1 VTI: 14 CM
BH CV ECHO MEAS - RVOT DIAM: 1.93 CM
BH CV ECHO MEAS - RVSP: 23.3 MMHG
BH CV ECHO MEAS - SUP REN AO DIAM: 2 CM
BH CV ECHO MEAS - SV(LVOT): 38.4 ML
BH CV ECHO MEAS - SV(MOD-SP2): 70 ML
BH CV ECHO MEAS - SV(MOD-SP4): 70 ML
BH CV ECHO MEAS - SV(RVOT): 40.9 ML
BH CV ECHO MEAS - SVI(LVOT): 18.4 ML/M2
BH CV ECHO MEAS - SVI(MOD-SP2): 33.5 ML/M2
BH CV ECHO MEAS - SVI(MOD-SP4): 33.5 ML/M2
BH CV ECHO MEAS - TAPSE (>1.6): 1.85 CM
BH CV ECHO MEAS - TR MAX PG: 20.3 MMHG
BH CV ECHO MEAS - TR MAX VEL: 225.3 CM/SEC
BH CV ECHO MEASUREMENTS AVERAGE E/E' RATIO: 6.07
BH CV XLRA - RV BASE: 2.9 CM
BH CV XLRA - RV LENGTH: 8.2 CM
BH CV XLRA - RV MID: 2.9 CM
BH CV XLRA - TDI S': 14.6 CM/SEC
LEFT ATRIUM VOLUME INDEX: 16.6 ML/M2
LV EF BIPLANE MOD: 57.3 %
SINUS: 3.1 CM
STJ: 3 CM

## 2025-01-28 PROCEDURE — 93306 TTE W/DOPPLER COMPLETE: CPT | Performed by: INTERNAL MEDICINE

## 2025-01-28 PROCEDURE — 93356 MYOCRD STRAIN IMG SPCKL TRCK: CPT | Performed by: INTERNAL MEDICINE

## 2025-01-28 PROCEDURE — 99214 OFFICE O/P EST MOD 30 MIN: CPT | Performed by: FAMILY MEDICINE

## 2025-01-28 PROCEDURE — 93000 ELECTROCARDIOGRAM COMPLETE: CPT | Performed by: FAMILY MEDICINE

## 2025-01-28 PROCEDURE — 93356 MYOCRD STRAIN IMG SPCKL TRCK: CPT

## 2025-01-28 PROCEDURE — 93306 TTE W/DOPPLER COMPLETE: CPT

## 2025-01-28 NOTE — PROGRESS NOTES
Date of Office Visit: 2025  Encounter Provider: MINESH Shields  Place of Service: [facility] CARDIOLOGY  Established cardiologist: Aleida Ga MD  Patient Name: John Cooper Jr.  :1985      Patient ID:  John Cooper Jr. is a 40 y.o. male is here for  followup    With a pertinent medical history of hypertension, nonischemic cardiomyopathy, intracranial hemorrhage 2023 with left-sided hemiaplasia, hyperlipidemia    There is a family history of hypertension, cancer and nonischemic cardiomyopathy. 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     History of Present Illness  Echocardiogram 2023 with an ejection fraction 37%, moderate LV dilation, moderate concentric LVH, normal RVSP.    Stress nuclear study 2023 showed ejection fraction of 34% with LV dilation and moderate hypokinesis, no evidence of ischemia.    In 2023 he presented with an unsteady gait and developed left-sided weakness and jumbled speech.  He was brought to the emergency department and had left hemiplegia with spasticity and a right basal ganglia intracranial hemorrhage requiring decompression evacuation 2023.  He required tracheostomy and a PEG tube placement.  He developed a severe tongue ulcer.  He underwent elective right cranioplasty 2024.  Following this he has had impaired mobility and impaired ADLs.  He has been living with his mother and father.  He follows with Dr. Figueroa at [facility] physical medicine.    Echocardiogram done at [facility] 2023 showed an ejection fraction of 30 to 35%, moderate LVH, moderate global hypokinesis of the left ventricle.  Trace mitral regurgitation.  Trace tricuspid regurgitation.  Trace pulmonic regurgitation.    He has sleep apnea and is being followed by sleep  medicine     He saw Dr. Ga in the office 11/19/2024.  Amlodipine was decreased to 5 mg nightly and Entresto 24/26 mg was added twice daily.  He had vocal sounds present during this exam and it was recommended he follow with ENT for questionable stenosis.     He completed an echocardiogram today prior to our appointment 1/28/2025 and this shows recovered ejection fraction 57.3%.  Normal LV cavity size, mild concentric left ventricular hypertrophy, normal diastolic function.  Calcified aortic valve.  Mild MAC and mild anterior mitral leaflet thickening.  Trace tricuspid regurgitation.  Normal RVSP.    Kenneth is here with his parents today.  He is feeling well, he is undergoing more intense physical therapy and has been tolerating that.  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  Entresto has been quite cost prohibitive and in October 2025 he will have new insurance coverage.  Sometimes there is short windedness with physical exertion during therapy, this is stable to him.  He has not had any chest pain or pressure.  There has not been any heart racing or palpitations.  No lightheadedness or dizziness.  No presyncope/syncope.  He has not had any leg swelling.     Current Outpatient Medications on File Prior to Visit   Medication Sig Dispense Refill    acetaminophen (TYLENOL) 325 MG tablet Take 2 tablets by mouth Every 6 (Six) Hours As Needed for Mild Pain.      amLODIPine (NORVASC) 5 MG tablet Take 1 tablet by mouth Every Night. 90 tablet 3    carvedilol (COREG) 12.5 MG tablet Take 1 tablet by mouth 2 (Two) Times a Day With Meals. 180 tablet 1    doxazosin (CARDURA) 8 MG tablet Take 1 tablet by mouth 2 (Two) Times a Day. 180 tablet 1    gabapentin (NEURONTIN) 300 MG capsule Take 1 capsule by mouth Every Night.      sacubitril-valsartan (Entresto) 24-26 MG tablet Take 1 tablet by mouth 2 (Two) Times a Day. 180 tablet 3     "spironolactone (ALDACTONE) 25 MG tablet Take 1 tablet by mouth Daily. 90 tablet 1    tiZANidine (ZANAFLEX) 4 MG tablet Take 1 tablet by mouth 3 (Three) Times a Day. 180 tablet 1    traZODone (DESYREL) 50 MG tablet Take 1-2 tablets by mouth Every Night. 180 tablet 1    vitamin C (ASCORBIC ACID) 250 MG tablet Take 1 tablet by mouth Daily. Take with iron 90 tablet 0    [DISCONTINUED] Blood Pressure kit Use as directed. Check blood pressure daily 1 each 0    [DISCONTINUED] ferrous gluconate (FERGON) 324 MG tablet Take 1 tablet by mouth Daily With Breakfast. Take with vitamin C 90 tablet 0     No current facility-administered medications on file prior to visit.         Procedures    ECG 12 Lead    Date/Time: 1/28/2025 3:02 PM  Performed by: Isabel Mendoza APRN    Authorized by: Isabel Mendoza APRN  Comparison: compared with previous ECG from 8/8/2023  Comparison to previous ECG: T wave inversion in leads III and aVF is again seen.  Nonspecific ST-T wave segment abnormalities are still seen and may be slightly improved and leads V5 and V6.  Rhythm: sinus rhythm  Rate: normal  BPM: 66  QRS axis: normal              Objective:      Vitals:    01/28/25 1414   BP: 125/80   Pulse: 66   SpO2: 96%   Weight: 88.5 kg (195 lb)   Height: 172.7 cm (68\")     Body mass index is 29.65 kg/m².  Wt Readings from Last 3 Encounters:   01/28/25 88.5 kg (195 lb)   01/28/25 95.3 kg (210 lb)   11/19/24 95.3 kg (210 lb)         Constitutional:       Comments: Kenneth is a 40-year-old -American male who is well-appearing, in a wheelchair today, he with evident left sided residual weakness, he is in no acute distress   Eyes:      Pupils: Pupils are equal, round, and reactive to light.   Pulmonary:      Effort: Pulmonary effort is normal.      Breath sounds: Normal breath sounds.   Cardiovascular:      Normal rate. Regular rhythm.      Murmurs: There is no murmur.   Pulses:     Radial: 2+ bilaterally.     Dorsalis pedis: 2+ " bilaterally.     Posterior tibial: 2+ bilaterally.  Edema:     Peripheral edema absent.   Abdominal:      Palpations: Abdomen is soft.   Musculoskeletal:      Cervical back: Normal range of motion. Skin:     General: Skin is warm and dry.   Neurological:      Mental Status: Alert and oriented to person, place and time.       Lab Review:      Lab Results   Component Value Date    TSH 2.690 07/02/2024       Lab Results   Component Value Date    CHLPL 257 (H) 04/27/2023     Lab Results   Component Value Date    TRIG 234 (H) 04/27/2023     Lab Results   Component Value Date    HDL 52 04/27/2023     Lab Results   Component Value Date     (H) 04/27/2023       Lab Results   Component Value Date    WBC 5.06 07/02/2024    HGB 12.3 (L) 07/02/2024    HCT 37.6 07/02/2024    MCV 82.6 07/02/2024     07/02/2024       Lab Results   Component Value Date    GLUCOSE 93 07/18/2024    BUN 19 07/18/2024    CREATININE 1.58 (H) 07/18/2024    EGFRRESULT 56.4 (L) 07/18/2024    BCR 12.0 07/18/2024    K 4.6 07/18/2024    CO2 28.1 07/18/2024    CALCIUM 9.3 07/18/2024    PROTENTOTREF 7.9 07/02/2024    ALBUMIN 4.6 07/02/2024    BILITOT 0.2 07/02/2024    AST 12 07/02/2024    ALT 11 07/02/2024       Lab Results   Component Value Date    HGBA1C 5.30 07/02/2024       Assessment:     1. NICM (nonischemic cardiomyopathy)    2. Primary hypertension    3. Mixed hyperlipidemia    4. Abnormal ECG    NICM, due to hypertension.  He now has recovered EF on TTE 1/28/2025.  Maintain his present medication regimen.  Hypertension is controlled.  Hyperlipidemia, lipid panel is pending.   Abnormal ECG, appears unchanged from study August 2023.   Hemorrhagic stroke November 2023, status postcraniotomy with mild left-sided hemiaplasia.  He is undergoing frequent physical therapy and receiving Botox for spasticity.  History of tracheostomy saw ENT for vocal cord weakness and will be doing therapy for that  FAUSTINO on CPAP  Family history of  cardiomyopathy    Plan:   No medication changes were made  Cardiac wise he is stable today.  His EF has recovered.  Stressed the importance of continued med regimen- he is doing a great job with this.  Complete pending labs- including lipid panel   Entresto samples and $10 co-pay card was given today.  Referral to pharmacy was placed for assistance with Entresto coverage.   He will see Dr. Ga in 3 months        Thank you for allowing me to participate in this patient's care. Please call with any questions or concerns.          Dragon dictation device was utilized in this note.

## 2025-01-28 NOTE — TELEPHONE ENCOUNTER
Caller: JEFF FARRELL    Relationship: Mother    Best call back number: [phone]    What was the call regarding: PATIENT'S MOTHER CALLED LOOKING TO GET A QUOTE FOR COST OF ECHO APPOINTMENT TODAY WITH PATIENT'S INSURANCE.

## 2025-01-29 ENCOUNTER — TELEPHONE (OUTPATIENT)
Dept: CARDIOLOGY | Facility: CLINIC | Age: 40
End: 2025-01-29
Payer: COMMERCIAL

## 2025-01-29 NOTE — TELEPHONE ENCOUNTER
Caller: JEFF FARRELL    Relationship: Mother    Best call back number: [phone]      What was the call regarding: PHARMACY TOLD PATIENT HE NEEDED A PRIOR AUTH TO FILL MEDICATION.

## 2025-02-24 ENCOUNTER — OFFICE VISIT (OUTPATIENT)
Dept: SLEEP MEDICINE | Facility: HOSPITAL | Age: 40
End: 2025-02-24
Payer: COMMERCIAL

## 2025-02-24 VITALS — WEIGHT: 195 LBS | BODY MASS INDEX: 29.55 KG/M2 | HEIGHT: 68 IN | HEART RATE: 66 BPM | OXYGEN SATURATION: 95 %

## 2025-02-24 DIAGNOSIS — I50.21 ACUTE HFREF (HEART FAILURE WITH REDUCED EJECTION FRACTION): ICD-10-CM

## 2025-02-24 DIAGNOSIS — I69.398: ICD-10-CM

## 2025-02-24 DIAGNOSIS — F51.04 PSYCHOPHYSIOLOGICAL INSOMNIA: ICD-10-CM

## 2025-02-24 DIAGNOSIS — G47.33 OSA (OBSTRUCTIVE SLEEP APNEA): Primary | ICD-10-CM

## 2025-02-24 DIAGNOSIS — I10 PRIMARY HYPERTENSION: ICD-10-CM

## 2025-02-24 DIAGNOSIS — G47.37: ICD-10-CM

## 2025-02-24 PROCEDURE — G0463 HOSPITAL OUTPT CLINIC VISIT: HCPCS

## 2025-02-24 PROCEDURE — 99214 OFFICE O/P EST MOD 30 MIN: CPT | Performed by: PSYCHIATRY & NEUROLOGY

## 2025-02-24 NOTE — PROGRESS NOTES
"Follow Up [facility] Note       Primary Care Physician: Nancy Mendez, MINESH    Interval History:   The patient is a 40 y.o. male      History of Present Illness  The patient presents for evaluation of sleep apnea.    He has been utilizing a CPAP machine, specifically the AirSense 11 model, which he reports as being effective in managing his condition. He uses a nasal mask in conjunction with a chinstrap. He has been experimenting with different masks to optimize his comfort and treatment efficacy. He has not received any new supplies since his last visit.         Downloaded PAP Data Evaluated For Therapeutic Response and Compliance:  DME is [employer].  Downloads between 1/22/2025 and 2/20/2025.  Average usage is more than 4 hours for 28 days or 93%.  Average AHI is 2.8.  PAP pressure is 10.5 CWP.    The patient uses a pillows and a chinstrap interface.    Review of Systems:    A complete review of systems was done and all were negative with the exception of none    Social History:    Social History     Socioeconomic History    Marital status: Single   Tobacco Use    Smoking status: Never     Passive exposure: Never    Smokeless tobacco: Never    Tobacco comments:     Social cigar smoker   Vaping Use    Vaping status: Never Used   Substance and Sexual Activity    Alcohol use: Yes     Comment: social    Drug use: Not Currently    Sexual activity: Defer       Allergies:  Banana, Latex, and Other     Medication Review:  Reviewed.      Vital Signs:    Vitals:    02/24/25 1151   Pulse: 66   SpO2: 95%   Weight: 88.5 kg (195 lb)   Height: 172.7 cm (68\")     Body mass index is 29.65 kg/m².  .BMIFOLLOWUP    Physical Exam:    Constitutional:  Well developed 40 y.o. male that appears in no apparent distress.  Awake & oriented times 3.  Normal mood with normal recent and remote memory and normal judgement.  Eyes:  Conjunctivae normal.      Self-administered Bruno Sleepiness Scale test results: 4  0-5 Lower " normal daytime sleepiness  6-10 Higher normal daytime sleepiness  11-12 Mild, 13-15 Moderate, & 16-24 Severe excessive daytime sleepiness       I have reviewed the above results and compared them with the patient's last downloads and reviewed with the patient.    Impression:     Encounter Diagnoses   Name Primary?    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 Sleep apnea.  His sleep apnea is classified as mild, with an apnea-hypopnea index (AHI) of 9. He exhibits a preference for supine sleeping position and experiences snoring approximately 60% of the time. The absence of central apneas is noted, which is a positive indicator. He has successfully met the compliance criteria for CPAP therapy. He is advised to continue using the CPAP machine for a minimum of 7.5 to 8 hours per night. He is also encouraged to experiment with different masks to identify the one that provides optimal comfort and effectiveness. A copy of the report will be provided for his records.    Follow-up  The patient will follow up in 1 year.         Good sleep hygiene measures should be maintained.  Weight loss would be beneficial in this patient who overweight by Body mass index is 29.65 kg/m².      After evaluating the patient and assessing results available, the patient is benefiting from the treatment being provided.     The patient will continue CPAP.  Potential side effects of PAP therapy reviewed and addressed as needed.  After clinical evaluation and review of downloads, I recommend no changes to the patient's pressures.      I answered all of the patient's questions.  The patient will call the [facility] for any problems and will follow up 1 year.    Patient or patient representative verbalized consent for the use of  Ambient Listening during the visit with  Robi Mehta MD for chart documentation. 2/24/2025  13:15 EST           Robi Mehta MD  Sleep Medicine  02/24/25  12:04 EST

## 2025-03-03 ENCOUNTER — TELEPHONE (OUTPATIENT)
Dept: SLEEP MEDICINE | Facility: HOSPITAL | Age: 40
End: 2025-03-03
Payer: COMMERCIAL

## 2025-03-03 DIAGNOSIS — G47.33 OSA (OBSTRUCTIVE SLEEP APNEA): Primary | ICD-10-CM

## 2025-03-03 DIAGNOSIS — I10 PRIMARY HYPERTENSION: ICD-10-CM

## 2025-03-09 ENCOUNTER — APPOINTMENT (OUTPATIENT)
Dept: CT IMAGING | Facility: HOSPITAL | Age: 40
DRG: 101 | End: 2025-03-09
Payer: COMMERCIAL

## 2025-03-09 ENCOUNTER — HOSPITAL ENCOUNTER (INPATIENT)
Facility: HOSPITAL | Age: 40
LOS: 3 days | Discharge: HOME | DRG: 101 | End: 2025-03-14
Attending: EMERGENCY MEDICINE | Admitting: INTERNAL MEDICINE
Payer: COMMERCIAL

## 2025-03-09 ENCOUNTER — APPOINTMENT (OUTPATIENT)
Dept: NEUROLOGY | Facility: HOSPITAL | Age: 40
DRG: 101 | End: 2025-03-09
Payer: COMMERCIAL

## 2025-03-09 ENCOUNTER — APPOINTMENT (OUTPATIENT)
Dept: GENERAL RADIOLOGY | Facility: HOSPITAL | Age: 40
DRG: 101 | End: 2025-03-09
Payer: COMMERCIAL

## 2025-03-09 DIAGNOSIS — M79.605 LEFT LEG PAIN: ICD-10-CM

## 2025-03-09 DIAGNOSIS — R56.9 NEW ONSET SEIZURE: Primary | ICD-10-CM

## 2025-03-09 LAB
ALBUMIN SERPL-MCNC: 4.3 G/DL (ref 3.5–5.2)
ALBUMIN/GLOB SERPL: 1.3 G/DL
ALP SERPL-CCNC: 96 U/L (ref 39–117)
ALT SERPL W P-5'-P-CCNC: 17 U/L (ref 1–41)
AMPHET+METHAMPHET UR QL: NEGATIVE
AMPHETAMINES UR QL: NEGATIVE
ANION GAP SERPL CALCULATED.3IONS-SCNC: 18 MMOL/L (ref 5–15)
AST SERPL-CCNC: 21 U/L (ref 1–40)
BACTERIA UR QL AUTO: NORMAL /HPF
BARBITURATES UR QL SCN: NEGATIVE
BASOPHILS # BLD AUTO: 0.03 10*3/MM3 (ref 0–0.2)
BASOPHILS NFR BLD AUTO: 0.6 % (ref 0–1.5)
BENZODIAZ UR QL SCN: NEGATIVE
BILIRUB SERPL-MCNC: 0.2 MG/DL (ref 0–1.2)
BILIRUB UR QL STRIP: NEGATIVE
BUN SERPL-MCNC: 21 MG/DL (ref 6–20)
BUN/CREAT SERPL: 11.8 (ref 7–25)
BUPRENORPHINE SERPL-MCNC: NEGATIVE NG/ML
CALCIUM SPEC-SCNC: 9.2 MG/DL (ref 8.6–10.5)
CANNABINOIDS SERPL QL: NEGATIVE
CHLORIDE SERPL-SCNC: 101 MMOL/L (ref 98–107)
CK SERPL-CCNC: 806 U/L (ref 20–200)
CLARITY UR: CLEAR
CO2 SERPL-SCNC: 19 MMOL/L (ref 22–29)
COCAINE UR QL: NEGATIVE
COLOR UR: YELLOW
CREAT SERPL-MCNC: 1.78 MG/DL (ref 0.76–1.27)
D-LACTATE SERPL-SCNC: 1.2 MMOL/L (ref 0.5–2)
D-LACTATE SERPL-SCNC: 7.3 MMOL/L (ref 0.5–2)
DEPRECATED RDW RBC AUTO: 42.1 FL (ref 37–54)
EGFRCR SERPLBLD CKD-EPI 2021: 48.5 ML/MIN/1.73
EOSINOPHIL # BLD AUTO: 0.13 10*3/MM3 (ref 0–0.4)
EOSINOPHIL NFR BLD AUTO: 2.6 % (ref 0.3–6.2)
ERYTHROCYTE [DISTWIDTH] IN BLOOD BY AUTOMATED COUNT: 13.4 % (ref 12.3–15.4)
ETHANOL BLD-MCNC: <10 MG/DL (ref 0–10)
ETHANOL UR QL: <0.01 %
FENTANYL UR-MCNC: NEGATIVE NG/ML
GLOBULIN UR ELPH-MCNC: 3.4 GM/DL
GLUCOSE SERPL-MCNC: 156 MG/DL (ref 65–99)
GLUCOSE UR STRIP-MCNC: NEGATIVE MG/DL
HCT VFR BLD AUTO: 38.5 % (ref 37.5–51)
HGB BLD-MCNC: 12.7 G/DL (ref 13–17.7)
HGB UR QL STRIP.AUTO: ABNORMAL
HYALINE CASTS UR QL AUTO: NORMAL /LPF
IMM GRANULOCYTES # BLD AUTO: 0.05 10*3/MM3 (ref 0–0.05)
IMM GRANULOCYTES NFR BLD AUTO: 1 % (ref 0–0.5)
KETONES UR QL STRIP: NEGATIVE
LEUKOCYTE ESTERASE UR QL STRIP.AUTO: NEGATIVE
LYMPHOCYTES # BLD AUTO: 1.41 10*3/MM3 (ref 0.7–3.1)
LYMPHOCYTES NFR BLD AUTO: 28 % (ref 19.6–45.3)
MCH RBC QN AUTO: 28.5 PG (ref 26.6–33)
MCHC RBC AUTO-ENTMCNC: 33 G/DL (ref 31.5–35.7)
MCV RBC AUTO: 86.3 FL (ref 79–97)
METHADONE UR QL SCN: NEGATIVE
MONOCYTES # BLD AUTO: 0.18 10*3/MM3 (ref 0.1–0.9)
MONOCYTES NFR BLD AUTO: 3.6 % (ref 5–12)
NEUTROPHILS NFR BLD AUTO: 3.23 10*3/MM3 (ref 1.7–7)
NEUTROPHILS NFR BLD AUTO: 64.2 % (ref 42.7–76)
NITRITE UR QL STRIP: NEGATIVE
NRBC BLD AUTO-RTO: 0 /100 WBC (ref 0–0.2)
OPIATES UR QL: NEGATIVE
OXYCODONE UR QL SCN: NEGATIVE
PCP UR QL SCN: NEGATIVE
PH UR STRIP.AUTO: 5.5 [PH] (ref 5–8)
PLATELET # BLD AUTO: 217 10*3/MM3 (ref 140–450)
PMV BLD AUTO: 12.1 FL (ref 6–12)
POTASSIUM SERPL-SCNC: 4.8 MMOL/L (ref 3.5–5.2)
PROT SERPL-MCNC: 7.7 G/DL (ref 6–8.5)
PROT UR QL STRIP: ABNORMAL
RBC # BLD AUTO: 4.46 10*6/MM3 (ref 4.14–5.8)
RBC # UR STRIP: NORMAL /HPF
REF LAB TEST METHOD: NORMAL
SODIUM SERPL-SCNC: 138 MMOL/L (ref 136–145)
SP GR UR STRIP: 1.02 (ref 1–1.03)
SQUAMOUS #/AREA URNS HPF: NORMAL /HPF
TRICYCLICS UR QL SCN: NEGATIVE
UROBILINOGEN UR QL STRIP: ABNORMAL
WBC # UR STRIP: NORMAL /HPF
WBC NRBC COR # BLD AUTO: 5.03 10*3/MM3 (ref 3.4–10.8)

## 2025-03-09 PROCEDURE — 70450 CT HEAD/BRAIN W/O DYE: CPT

## 2025-03-09 PROCEDURE — 25010000002 LEVETRIRACETAM PER 10 MG: Performed by: PSYCHIATRY & NEUROLOGY

## 2025-03-09 PROCEDURE — 80307 DRUG TEST PRSMV CHEM ANLYZR: CPT | Performed by: EMERGENCY MEDICINE

## 2025-03-09 PROCEDURE — 99204 OFFICE O/P NEW MOD 45 MIN: CPT | Performed by: PSYCHIATRY & NEUROLOGY

## 2025-03-09 PROCEDURE — G0378 HOSPITAL OBSERVATION PER HR: HCPCS

## 2025-03-09 PROCEDURE — 83605 ASSAY OF LACTIC ACID: CPT | Performed by: EMERGENCY MEDICINE

## 2025-03-09 PROCEDURE — 81001 URINALYSIS AUTO W/SCOPE: CPT | Performed by: EMERGENCY MEDICINE

## 2025-03-09 PROCEDURE — 95816 EEG AWAKE AND DROWSY: CPT | Performed by: PSYCHIATRY & NEUROLOGY

## 2025-03-09 PROCEDURE — 99291 CRITICAL CARE FIRST HOUR: CPT

## 2025-03-09 PROCEDURE — 25010000002 LEVETRIRACETAM PER 10 MG: Performed by: EMERGENCY MEDICINE

## 2025-03-09 PROCEDURE — 25810000003 SODIUM CHLORIDE 0.9 % SOLUTION: Performed by: NURSE PRACTITIONER

## 2025-03-09 PROCEDURE — 80053 COMPREHEN METABOLIC PANEL: CPT | Performed by: EMERGENCY MEDICINE

## 2025-03-09 PROCEDURE — 95816 EEG AWAKE AND DROWSY: CPT

## 2025-03-09 PROCEDURE — 73502 X-RAY EXAM HIP UNI 2-3 VIEWS: CPT

## 2025-03-09 PROCEDURE — 82550 ASSAY OF CK (CPK): CPT

## 2025-03-09 PROCEDURE — 85025 COMPLETE CBC W/AUTO DIFF WBC: CPT | Performed by: EMERGENCY MEDICINE

## 2025-03-09 PROCEDURE — 82077 ASSAY SPEC XCP UR&BREATH IA: CPT | Performed by: EMERGENCY MEDICINE

## 2025-03-09 RX ORDER — GABAPENTIN 100 MG/1
100 CAPSULE ORAL NIGHTLY
Status: DISCONTINUED | OUTPATIENT
Start: 2025-03-09 | End: 2025-03-14 | Stop reason: HOSPADM

## 2025-03-09 RX ORDER — BISACODYL 10 MG
10 SUPPOSITORY, RECTAL RECTAL DAILY PRN
Status: DISCONTINUED | OUTPATIENT
Start: 2025-03-09 | End: 2025-03-14 | Stop reason: HOSPADM

## 2025-03-09 RX ORDER — SODIUM CHLORIDE 0.9 % (FLUSH) 0.9 %
10 SYRINGE (ML) INJECTION EVERY 12 HOURS SCHEDULED
Status: DISCONTINUED | OUTPATIENT
Start: 2025-03-09 | End: 2025-03-12

## 2025-03-09 RX ORDER — SPIRONOLACTONE 25 MG/1
25 TABLET ORAL DAILY
Status: DISCONTINUED | OUTPATIENT
Start: 2025-03-10 | End: 2025-03-14 | Stop reason: HOSPADM

## 2025-03-09 RX ORDER — BISACODYL 5 MG/1
5 TABLET, DELAYED RELEASE ORAL DAILY PRN
Status: DISCONTINUED | OUTPATIENT
Start: 2025-03-09 | End: 2025-03-14 | Stop reason: HOSPADM

## 2025-03-09 RX ORDER — CARVEDILOL 12.5 MG/1
12.5 TABLET ORAL 2 TIMES DAILY WITH MEALS
Status: DISCONTINUED | OUTPATIENT
Start: 2025-03-09 | End: 2025-03-14 | Stop reason: HOSPADM

## 2025-03-09 RX ORDER — ASCORBIC ACID 500 MG
250 TABLET ORAL DAILY
Status: DISCONTINUED | OUTPATIENT
Start: 2025-03-09 | End: 2025-03-11

## 2025-03-09 RX ORDER — AMOXICILLIN 250 MG
2 CAPSULE ORAL 2 TIMES DAILY PRN
Status: DISCONTINUED | OUTPATIENT
Start: 2025-03-09 | End: 2025-03-14 | Stop reason: HOSPADM

## 2025-03-09 RX ORDER — ONDANSETRON 4 MG/1
4 TABLET, ORALLY DISINTEGRATING ORAL EVERY 6 HOURS PRN
Status: DISCONTINUED | OUTPATIENT
Start: 2025-03-09 | End: 2025-03-14 | Stop reason: HOSPADM

## 2025-03-09 RX ORDER — ONDANSETRON 2 MG/ML
4 INJECTION INTRAMUSCULAR; INTRAVENOUS EVERY 6 HOURS PRN
Status: DISCONTINUED | OUTPATIENT
Start: 2025-03-09 | End: 2025-03-14 | Stop reason: HOSPADM

## 2025-03-09 RX ORDER — POLYETHYLENE GLYCOL 3350 17 G/17G
17 POWDER, FOR SOLUTION ORAL DAILY PRN
Status: DISCONTINUED | OUTPATIENT
Start: 2025-03-09 | End: 2025-03-14 | Stop reason: HOSPADM

## 2025-03-09 RX ORDER — AMLODIPINE BESYLATE 5 MG/1
5 TABLET ORAL NIGHTLY
Status: DISCONTINUED | OUTPATIENT
Start: 2025-03-09 | End: 2025-03-12

## 2025-03-09 RX ORDER — ACETAMINOPHEN 325 MG/1
650 TABLET ORAL EVERY 6 HOURS PRN
Status: DISCONTINUED | OUTPATIENT
Start: 2025-03-09 | End: 2025-03-14 | Stop reason: HOSPADM

## 2025-03-09 RX ORDER — SODIUM CHLORIDE 0.9 % (FLUSH) 0.9 %
10 SYRINGE (ML) INJECTION AS NEEDED
Status: DISCONTINUED | OUTPATIENT
Start: 2025-03-09 | End: 2025-03-12

## 2025-03-09 RX ORDER — LEVETIRACETAM 500 MG/5ML
1000 INJECTION, SOLUTION, CONCENTRATE INTRAVENOUS ONCE
Status: COMPLETED | OUTPATIENT
Start: 2025-03-09 | End: 2025-03-09

## 2025-03-09 RX ORDER — ACETAMINOPHEN 500 MG
500 TABLET ORAL ONCE
Status: COMPLETED | OUTPATIENT
Start: 2025-03-09 | End: 2025-03-09

## 2025-03-09 RX ORDER — TRAZODONE HYDROCHLORIDE 50 MG/1
50 TABLET ORAL NIGHTLY
Status: DISCONTINUED | OUTPATIENT
Start: 2025-03-09 | End: 2025-03-14 | Stop reason: HOSPADM

## 2025-03-09 RX ORDER — BACLOFEN 10 MG/1
20 TABLET ORAL 3 TIMES DAILY
Status: DISCONTINUED | OUTPATIENT
Start: 2025-03-09 | End: 2025-03-11

## 2025-03-09 RX ORDER — LEVETIRACETAM 500 MG/5ML
500 INJECTION, SOLUTION, CONCENTRATE INTRAVENOUS EVERY 12 HOURS SCHEDULED
Status: DISCONTINUED | OUTPATIENT
Start: 2025-03-09 | End: 2025-03-12

## 2025-03-09 RX ORDER — SODIUM CHLORIDE 9 MG/ML
40 INJECTION, SOLUTION INTRAVENOUS AS NEEDED
Status: DISCONTINUED | OUTPATIENT
Start: 2025-03-09 | End: 2025-03-12

## 2025-03-09 RX ORDER — TERAZOSIN 5 MG/1
5 CAPSULE ORAL EVERY 12 HOURS SCHEDULED
Status: DISCONTINUED | OUTPATIENT
Start: 2025-03-09 | End: 2025-03-14 | Stop reason: HOSPADM

## 2025-03-09 RX ORDER — SACUBITRIL AND VALSARTAN 24; 26 MG/1; MG/1
1 TABLET, FILM COATED ORAL 2 TIMES DAILY
Status: DISCONTINUED | OUTPATIENT
Start: 2025-03-09 | End: 2025-03-11

## 2025-03-09 RX ADMIN — LEVETIRACETAM 500 MG: 100 INJECTION INTRAVENOUS at 20:38

## 2025-03-09 RX ADMIN — TRAZODONE HYDROCHLORIDE 50 MG: 50 TABLET ORAL at 20:38

## 2025-03-09 RX ADMIN — Medication 10 ML: at 14:20

## 2025-03-09 RX ADMIN — SACUBITRIL AND VALSARTAN 1 TABLET: 24; 26 TABLET, FILM COATED ORAL at 20:39

## 2025-03-09 RX ADMIN — Medication 10 ML: at 20:47

## 2025-03-09 RX ADMIN — AMLODIPINE BESYLATE 5 MG: 5 TABLET ORAL at 20:39

## 2025-03-09 RX ADMIN — GABAPENTIN 100 MG: 100 CAPSULE ORAL at 20:38

## 2025-03-09 RX ADMIN — LEVETIRACETAM 1000 MG: 100 INJECTION INTRAVENOUS at 09:17

## 2025-03-09 RX ADMIN — BACLOFEN 20 MG: 10 TABLET ORAL at 20:38

## 2025-03-09 RX ADMIN — ACETAMINOPHEN 650 MG: 325 TABLET, FILM COATED ORAL at 19:37

## 2025-03-09 RX ADMIN — OXYCODONE HYDROCHLORIDE AND ACETAMINOPHEN 250 MG: 500 TABLET ORAL at 18:30

## 2025-03-09 RX ADMIN — SODIUM CHLORIDE 1000 ML: 0.9 INJECTION, SOLUTION INTRAVENOUS at 14:20

## 2025-03-09 RX ADMIN — ACETAMINOPHEN 500 MG: 500 TABLET, FILM COATED ORAL at 11:44

## 2025-03-09 RX ADMIN — TIZANIDINE 4 MG: 4 TABLET ORAL at 20:38

## 2025-03-09 RX ADMIN — TERAZOSIN HYDROCHLORIDE 5 MG: 5 CAPSULE ORAL at 20:39

## 2025-03-09 RX ADMIN — CARVEDILOL 12.5 MG: 12.5 TABLET, FILM COATED ORAL at 20:39

## 2025-03-09 NOTE — ED PROVIDER NOTES
EMERGENCY DEPARTMENT ENCOUNTER  Room Number:  117/1  PCP: Nancy Mendez APRN  Independent Historians: Patient and EMS and patient's parents      HPI:  Chief Complaint: Seizure, hip pain    A complete HPI/ROS/PMH/PSH/SH/FH are unobtainable due to: None    Chronic or social conditions impacting patient care (Social Determinants of Health): None      Context: John Cooper Jr. is a 40 y.o. male with a medical history of stroke, hypertension, sleep apnea and dilated cardiomyopathy who presents to the ED c/o acute seizure event this morning.  Patient lives at home with his parents.  He awoke at normal time this morning.  He had spoken with his parents.  His mother went to go prepare some breakfast.  And she heard some noise coming from his room and went to go check on him.  She saw him having a seizure, his right side of the body was banging against the rail on his bed.  She says that his left side of the body was completely stiffened.  He was unresponsive and was foaming at the mouth.  She says that the seizure lasted for very long time.  When paramedics arrived to the house, patient was postictal and required some bag ventilation assistance.  Upon arrival here, patient does not recall what happened.  He is complaining of some pain in the left hip area.  Denies any other areas of pain right now.  There have been no new changes to his medications.  He denies any recent fevers or flulike symptoms.      Review of prior external notes (non-ED) -and- Review of prior external test results outside of this encounter: I independently reviewed the CT without contrast study from February 21, 2024 which showed a developing right frontotemporal subdural hematoma with 4 mm of maximal dural separation but without significant mass effect.  There was chronic right ganglial capsular encephalomalacia as well.    Prescription drug monitoring program review: CRISTY reviewed by Annie Perdomo MD, Yury James MD       PAST  MEDICAL HISTORY  Active Ambulatory Problems     Diagnosis Date Noted    Primary hypertension 05/16/2023    Mixed hyperlipidemia 05/16/2023    Abnormal ECG 05/16/2023    Class 1 obesity due to excess calories without serious comorbidity with body mass index (BMI) of 33.0 to 33.9 in adult 05/16/2023    NICM (nonischemic cardiomyopathy) 07/18/2023    Acute HFrEF (heart failure with reduced ejection fraction) 07/18/2023    FAUSTINO (obstructive sleep apnea) 08/26/2024    Hemorrhagic stroke 08/26/2024    Central sleep apnea secondary to cerebrovascular accident (CVA) 11/11/2024    Psychophysiological insomnia 11/11/2024    Nerve pain 11/11/2024     Resolved Ambulatory Problems     Diagnosis Date Noted    No Resolved Ambulatory Problems     Past Medical History:   Diagnosis Date    Allergic     Stroke          PAST SURGICAL HISTORY  Past Surgical History:   Procedure Laterality Date    ANKLE SURGERY Right 2015    MANDIBLE SURGERY Right 2019         FAMILY HISTORY  Family History   Problem Relation Age of Onset    Hypertension Father     Bone cancer Brother     Colon cancer Maternal Grandmother          SOCIAL HISTORY  Social History     Socioeconomic History    Marital status: Single   Tobacco Use    Smoking status: Never     Passive exposure: Never    Smokeless tobacco: Never    Tobacco comments:     Social cigar smoker   Vaping Use    Vaping status: Never Used   Substance and Sexual Activity    Alcohol use: Yes     Comment: social    Drug use: Not Currently    Sexual activity: Defer         ALLERGIES  Banana, Latex, and Other      REVIEW OF SYSTEMS  Review of Systems  Included in HPI  All systems reviewed and negative except for those discussed in HPI.      PHYSICAL EXAM    I have reviewed the triage vital signs and nursing notes.    ED Triage Vitals   Temp Heart Rate Resp BP SpO2   03/09/25 0837 03/09/25 0834 03/09/25 0834 03/09/25 0834 03/09/25 0834   96.7 °F (35.9 °C) 116 18 (!) 184/96 100 %      Temp src Heart Rate  Source Patient Position BP Location FiO2 (%)   03/09/25 0837 03/09/25 0834 -- 03/09/25 0834 --   Tympanic Monitor  Left arm        Physical Exam  GENERAL: alert, no acute distress  SKIN: Warm, dry, no rashes  HENT: Normocephalic, atraumatic.  Scar noted to the right scalp from prior craniotomy.  EYES: no scleral icterus, normal conjunctivae, pupils equally round and reactive, EOMI  CV: regular rhythm, regular rate, normal pulses  RESPIRATORY: normal effort, lungs clear bilaterally  ABDOMEN: soft, nondistended, nontender, no masses  MUSCULOSKELETAL: no deformity.  Mild tenderness to palpation of the left hip area but patient can tolerate normal passive range of motion for flexion of the left hip.  NEURO: alert, follows commands, speech is normal.  Left upper and lower extremity weakness notable and consistent with prior history of hemorrhagic stroke with deficit      LAB RESULTS  Recent Results (from the past 24 hours)   Comprehensive Metabolic Panel    Collection Time: 03/09/25  9:10 AM    Specimen: Blood   Result Value Ref Range    Glucose 156 (H) 65 - 99 mg/dL    BUN 21 (H) 6 - 20 mg/dL    Creatinine 1.78 (H) 0.76 - 1.27 mg/dL    Sodium 138 136 - 145 mmol/L    Potassium 4.8 3.5 - 5.2 mmol/L    Chloride 101 98 - 107 mmol/L    CO2 19.0 (L) 22.0 - 29.0 mmol/L    Calcium 9.2 8.6 - 10.5 mg/dL    Total Protein 7.7 6.0 - 8.5 g/dL    Albumin 4.3 3.5 - 5.2 g/dL    ALT (SGPT) 17 1 - 41 U/L    AST (SGOT) 21 1 - 40 U/L    Alkaline Phosphatase 96 39 - 117 U/L    Total Bilirubin 0.2 0.0 - 1.2 mg/dL    Globulin 3.4 gm/dL    A/G Ratio 1.3 g/dL    BUN/Creatinine Ratio 11.8 7.0 - 25.0    Anion Gap 18.0 (H) 5.0 - 15.0 mmol/L    eGFR 48.5 (L) >60.0 mL/min/1.73   Ethanol    Collection Time: 03/09/25  9:10 AM    Specimen: Blood   Result Value Ref Range    Ethanol <10 0 - 10 mg/dL    Ethanol % <0.010 %   CBC Auto Differential    Collection Time: 03/09/25  9:10 AM    Specimen: Blood   Result Value Ref Range    WBC 5.03 3.40 - 10.80  10*3/mm3    RBC 4.46 4.14 - 5.80 10*6/mm3    Hemoglobin 12.7 (L) 13.0 - 17.7 g/dL    Hematocrit 38.5 37.5 - 51.0 %    MCV 86.3 79.0 - 97.0 fL    MCH 28.5 26.6 - 33.0 pg    MCHC 33.0 31.5 - 35.7 g/dL    RDW 13.4 12.3 - 15.4 %    RDW-SD 42.1 37.0 - 54.0 fl    MPV 12.1 (H) 6.0 - 12.0 fL    Platelets 217 140 - 450 10*3/mm3    Neutrophil % 64.2 42.7 - 76.0 %    Lymphocyte % 28.0 19.6 - 45.3 %    Monocyte % 3.6 (L) 5.0 - 12.0 %    Eosinophil % 2.6 0.3 - 6.2 %    Basophil % 0.6 0.0 - 1.5 %    Immature Grans % 1.0 (H) 0.0 - 0.5 %    Neutrophils, Absolute 3.23 1.70 - 7.00 10*3/mm3    Lymphocytes, Absolute 1.41 0.70 - 3.10 10*3/mm3    Monocytes, Absolute 0.18 0.10 - 0.90 10*3/mm3    Eosinophils, Absolute 0.13 0.00 - 0.40 10*3/mm3    Basophils, Absolute 0.03 0.00 - 0.20 10*3/mm3    Immature Grans, Absolute 0.05 0.00 - 0.05 10*3/mm3    nRBC 0.0 0.0 - 0.2 /100 WBC   Lactic Acid, Plasma    Collection Time: 03/09/25  9:10 AM    Specimen: Blood   Result Value Ref Range    Lactate 7.3 (C) 0.5 - 2.0 mmol/L   Urinalysis With Microscopic If Indicated (No Culture) - Urine, Clean Catch    Collection Time: 03/09/25 11:47 AM    Specimen: Urine, Clean Catch   Result Value Ref Range    Color, UA Yellow Yellow, Straw    Appearance, UA Clear Clear    pH, UA 5.5 5.0 - 8.0    Specific Gravity, UA 1.017 1.005 - 1.030    Glucose, UA Negative Negative    Ketones, UA Negative Negative    Bilirubin, UA Negative Negative    Blood, UA Small (1+) (A) Negative    Protein, UA 30 mg/dL (1+) (A) Negative    Leuk Esterase, UA Negative Negative    Nitrite, UA Negative Negative    Urobilinogen, UA 0.2 E.U./dL 0.2 - 1.0 E.U./dL   Urine Drug Screen - Urine, Clean Catch    Collection Time: 03/09/25 11:47 AM    Specimen: Urine, Clean Catch   Result Value Ref Range    THC, Screen, Urine Negative Negative    Phencyclidine (PCP), Urine Negative Negative    Cocaine Screen, Urine Negative Negative    Methamphetamine, Ur Negative Negative    Opiate Screen Negative  Negative    Amphetamine Screen, Urine Negative Negative    Benzodiazepine Screen, Urine Negative Negative    Tricyclic Antidepressants Screen Negative Negative    Methadone Screen, Urine Negative Negative    Barbiturates Screen, Urine Negative Negative    Oxycodone Screen, Urine Negative Negative    Buprenorphine, Screen, Urine Negative Negative   Fentanyl, Urine - Urine, Clean Catch    Collection Time: 03/09/25 11:47 AM    Specimen: Urine, Clean Catch   Result Value Ref Range    Fentanyl, Urine Negative Negative   Urinalysis, Microscopic Only - Urine, Clean Catch    Collection Time: 03/09/25 11:47 AM    Specimen: Urine, Clean Catch   Result Value Ref Range    RBC, UA 0-2 None Seen, 0-2 /HPF    WBC, UA 0-2 None Seen, 0-2 /HPF    Bacteria, UA None Seen None Seen /HPF    Squamous Epithelial Cells, UA 0-2 None Seen, 0-2 /HPF    Hyaline Casts, UA 0-2 None Seen /LPF    Methodology Automated Microscopy    STAT Lactic Acid, Reflex    Collection Time: 03/09/25  2:22 PM    Specimen: Arm, Right; Blood   Result Value Ref Range    Lactate 1.2 0.5 - 2.0 mmol/L         RADIOLOGY  CT Head Without Contrast  Result Date: 3/9/2025  CT HEAD WO CONTRAST-  DATE OF EXAM: 3/9/2025 10:34 AM  INDICATION: Seizure. Head trauma.  COMPARISON: None available. Correlation is made with the reports from the prior head CTs performed at an outside facility on 2/21/2024 and 1/23/2024 with the imaging not currently available for direct comparison.  TECHNIQUE: Multiple contiguous axial images were acquired through the head without the intravenous administration of contrast. Reformatted coronal and sagittal sequences were also reviewed. Radiation dose reduction techniques were utilized, including automated exposure control and exposure modulation based on body size.  FINDINGS: No acute intracranial hemorrhage or mass/mass effect. Postoperative changes from right calvarial craniotomy and pterional craniectomy with a 4 cm x 1.5 cm subcutaneous/subgaleal  CSF attenuation collection overlying the pterional craniectomy consistent with a pseudomeningocele. Large region of encephalomalacia in the right frontal and temporal lobes and the right basal ganglia, likely from old infarct or hemorrhage. Associated ex vacuo dilatation of the right lateral ventricle. No hydrocephalus. Minimal 6 mm left to right midline shift. The basilar cisterns are patent. Gray-white matter differentiation is otherwise grossly maintained. Limited imaging of the orbits, paranasal sinuses, and mastoid air cells is unremarkable. No acute osseous abnormality is identified.       1. No acute intracranial hemorrhage or mass/mass effect. 2. Right frontal temporal and right basal ganglia encephalomalacia, likely from old infarct or hemorrhage, with associated ex vacuo dilatation of the right lateral ventricle and mild left to right midline shift. 3. Postoperative changes from right calvarial craniotomy and right pterional craniectomy with a 4 cm x 1.5 cm subcutaneous/subgaleal CSF attenuation collection overlying the pterional craniectomy, consistent with a pseudomeningocele. Recommend comparison to prior imaging if available  This report was finalized on 3/9/2025 11:21 AM by Mino Estrada MD on Workstation: [id number]      XR Hip With or Without Pelvis 2 - 3 View Left  Result Date: 3/9/2025  XR HIP W OR WO PELVIS 2-3 VIEW LEFT-  INDICATIONS: Pain  TECHNIQUE: 5 VIEWS INCLUDING THE PELVIS AND LEFT HIP  COMPARISON: None available  FINDINGS:  No acute fracture, erosion, or dislocation is identified. Left hip joint space appears preserved. Mild right hip joint space narrowing is apparent. Nonspecific elongated sclerotic focus is apparent in the right intertrochanteric region. Follow-up/further evaluation can be obtained as indications persist.       As described.    This report was finalized on 3/9/2025 10:27 AM by Dr. Maco Wright M.D on Workstation: [id number]          MEDICATIONS GIVEN IN  ER  Medications   sodium chloride 0.9 % flush 10 mL (has no administration in time range)   sodium chloride 0.9 % flush 10 mL (10 mL Intravenous Given 3/9/25 1420)   sodium chloride 0.9 % flush 10 mL (has no administration in time range)   sodium chloride 0.9 % infusion 40 mL (has no administration in time range)   ondansetron ODT (ZOFRAN-ODT) disintegrating tablet 4 mg (has no administration in time range)     Or   ondansetron (ZOFRAN) injection 4 mg (has no administration in time range)   sennosides-docusate (PERICOLACE) 8.6-50 MG per tablet 2 tablet (has no administration in time range)     And   polyethylene glycol (MIRALAX) packet 17 g (has no administration in time range)     And   bisacodyl (DULCOLAX) EC tablet 5 mg (has no administration in time range)     And   bisacodyl (DULCOLAX) suppository 10 mg (has no administration in time range)   levETIRAcetam (KEPPRA) injection 500 mg (has no administration in time range)   spironolactone (ALDACTONE) tablet 25 mg ( Oral Dose Auto Held 3/18/25 0900)   acetaminophen (TYLENOL) tablet 650 mg (has no administration in time range)   tiZANidine (ZANAFLEX) tablet 4 mg (has no administration in time range)   ascorbic acid (VITAMIN C) tablet 250 mg (has no administration in time range)   gabapentin (NEURONTIN) capsule 100 mg (has no administration in time range)   traZODone (DESYREL) tablet 50 mg (has no administration in time range)   carvedilol (COREG) tablet 12.5 mg (has no administration in time range)   sacubitril-valsartan (ENTRESTO) 24-26 MG tablet 1 tablet (has no administration in time range)   amLODIPine (NORVASC) tablet 5 mg (has no administration in time range)   terazosin (HYTRIN) capsule 5 mg (has no administration in time range)   levETIRAcetam (KEPPRA) injection 1,000 mg (1,000 mg Intravenous Given 3/9/25 0917)   acetaminophen (TYLENOL) tablet 500 mg (500 mg Oral Given 3/9/25 1144)   sodium chloride 0.9 % bolus 1,000 mL (1,000 mL Intravenous New Bag 3/9/25  1420)         ORDERS PLACED DURING THIS VISIT:  Orders Placed This Encounter   Procedures    XR Hip With or Without Pelvis 2 - 3 View Left    CT Head Without Contrast    CT Outside Films    CT Outside Films    CT Outside Films    Comprehensive Metabolic Panel    Urinalysis With Microscopic If Indicated (No Culture) - Urine, Clean Catch    Ethanol    Urine Drug Screen - Urine, Clean Catch    CBC Auto Differential    Lactic Acid, Plasma    STAT Lactic Acid, Reflex    CBC Auto Differential    Basic Metabolic Panel    Fentanyl, Urine - Urine, Clean Catch    Urinalysis, Microscopic Only - Urine, Clean Catch    Diet: Regular/House; Fluid Consistency: Thin (IDDSI 0)    Vital Signs    Up With Assistance    Advance Diet As Tolerated -    Intake & Output    Weigh Patient    Neuro Checks    Saline Lock & Maintain IV Access    Place Sequential Compression Device    Maintain Sequential Compression Device    Code Status and Medical Interventions: CPR (Attempt to Resuscitate); Full Support    Inpatient Neurology Consult General    Neurosurgery (on-call MD unless specified)    Inpatient Neurosurgery Consult    Inpatient Neurology Consult General    OT Consult: Eval & Treat ADL Performance Below Baseline    PT Consult: Eval & Treat Functional Mobility Below Baseline    EEG    Insert Peripheral IV    Insert Peripheral IV    Initiate Emergency Department Observation Status    Fall Precautions    Aspiration Precautions    Seizure Precautions    CBC & Differential         OUTPATIENT MEDICATION MANAGEMENT:  Current Facility-Administered Medications Ordered in Epic   Medication Dose Route Frequency Provider Last Rate Last Admin    acetaminophen (TYLENOL) tablet 650 mg  650 mg Oral Q6H PRN Amanda Proctor APRN        amLODIPine (NORVASC) tablet 5 mg  5 mg Oral Nightly Amanda Proctor APRN        ascorbic acid (VITAMIN C) tablet 250 mg  250 mg Oral Daily Amanda Proctor APRN        sennosides-docusate (PERICOLACE) 8.6-50 MG per tablet 2 tablet  2  tablet Oral BID PRN Amanda Proctor APRN        And    polyethylene glycol (MIRALAX) packet 17 g  17 g Oral Daily PRN Amanda Proctor APRN        And    bisacodyl (DULCOLAX) EC tablet 5 mg  5 mg Oral Daily PRN Amanda Proctor APRN        And    bisacodyl (DULCOLAX) suppository 10 mg  10 mg Rectal Daily PRN Amanda Proctor APRN        carvedilol (COREG) tablet 12.5 mg  12.5 mg Oral BID With Meals Amanda Proctor APRN        gabapentin (NEURONTIN) capsule 100 mg  100 mg Oral Nightly Amanda Proctor APRN        levETIRAcetam (KEPPRA) injection 500 mg  500 mg Intravenous Q12H Robi Vasquez MD        ondansetron ODT (ZOFRAN-ODT) disintegrating tablet 4 mg  4 mg Oral Q6H PRN Amanda Proctor APRN        Or    ondansetron (ZOFRAN) injection 4 mg  4 mg Intravenous Q6H PRN Amanda Proctor APRN        sacubitril-valsartan (ENTRESTO) 24-26 MG tablet 1 tablet  1 tablet Oral BID Amanda Proctor APRN        sodium chloride 0.9 % flush 10 mL  10 mL Intravenous PRN Amanda Proctor APRN        sodium chloride 0.9 % flush 10 mL  10 mL Intravenous Q12H Amanda Proctor APRN   10 mL at 03/09/25 1420    sodium chloride 0.9 % flush 10 mL  10 mL Intravenous PRN Amanda Proctor APRN        sodium chloride 0.9 % infusion 40 mL  40 mL Intravenous PRN Amanda Proctor APRN        [Held by provider] spironolactone (ALDACTONE) tablet 25 mg  25 mg Oral Daily Amanda Proctor APRN        terazosin (HYTRIN) capsule 5 mg  5 mg Oral Q12H Amanda Proctor APRN        tiZANidine (ZANAFLEX) tablet 4 mg  4 mg Oral TID Amanda Proctor APRN        traZODone (DESYREL) tablet 50 mg  50 mg Oral Nightly Amanda Proctor APRN         No current Epic-ordered outpatient medications on file.         PROCEDURES  Procedures      Critical care provider statement:    Critical care time (minutes): 33.   Critical care time was exclusive of:  Separately billable procedures and treating other patients   Critical care was necessary to treat or prevent imminent or life-threatening deterioration of  the following conditions:  CNS Failure   Critical care was time spent personally by me on the following activities:  Development of treatment plan with patient or surrogate, discussions with consultants, evaluation of patient's response to treatment, examination of patient, obtaining history from patient or surrogate, ordering and performing treatments and interventions, ordering and review of laboratory studies, ordering and review of radiographic studies, pulse oximetry, re-evaluation of patient's condition and review of old charts. Critical Care indicators: Seizure, new onset or with disorder with intensive drug management       PROGRESS, DATA ANALYSIS, CONSULTS, AND MEDICAL DECISION MAKING  All labs have been independently interpreted by me.  All radiology studies have been reviewed by me. All EKG's have been independently viewed and interpreted by me.  Discussion below represents my analysis of pertinent findings related to patient's condition, differential diagnosis, treatment plan and final disposition.    Differential diagnosis includes but is not limited to subdural hematoma, subarachnoid hemorrhage, epileptic seizure, hypoglycemia, hyponatremia.    Clinical Scores:                   ED Course as of 03/09/25 1542   Sun Mar 09, 2025   1017 Lactate(!!): 7.3 [REBECCA]   1017 BUN(!): 21 [REBECCA]   1017 Creatinine(!): 1.78 [REBECCA]   1045 I independently interpreted the x-ray of the left hip and pelvis and my findings are: No fracture or dislocation. [REBECCA]   1132 Sodium: 138 [REBECCA]   1133 Independently interpreted the CT head without contrast study and my findings are: No acute hemorrhage.  Right frontotemporal encephalomalacia is noted [REBECCA]   1150 Discussed with Dr. Vasquez from neurology about this patient.  He agrees to consult.  He recommends that we also consult neurosurgery for further recommendation about possible additional imaging that is necessary. [REBECCA]   1150 I discussed with Amanda Proctor in the observation unit  about this patient.  She agrees to admit him for further medical management today on behalf of Dr. Perdomo [REBECCA]      ED Course User Index  [REBECCA] Yury James MD         AS OF 15:42 EDT VITALS:    BP - 117/76  HR - 76  TEMP - 96.7 °F (35.9 °C) (Tympanic)  O2 SATS - 96%    COMPLEXITY OF CARE  The patient requires admission.      DIAGNOSIS  Final diagnoses:   New onset seizure   Left leg pain         DISPOSITION  ED Disposition       ED Disposition   Intended Admit    Condition   --    Comment   --                Please note that portions of this document were completed with a voice recognition program.    Note Disclaimer: At [facility], we believe that sharing information builds trust and better relationships. You are receiving this note because you recently visited [facility]. It is possible you will see health information before a provider has talked with you about it. This kind of information can be easy to misunderstand. To help you fully understand what it means for your health, we urge you to discuss this note with your provider.         Yury James MD  03/09/25 1541       Yury James MD  03/09/25 1542

## 2025-03-09 NOTE — ED NOTES
Pt to ed from home via EMS    Pt family reports seizure like activity. Unknown how long it lasted. When EMS arrived fire was bagging pt and pt was postictal. Pt alert and oriented in triage. Pt family reports hx seizure and stroke. Pt was in bed during seizure, no injuries reported.

## 2025-03-09 NOTE — H&P
[facility]   HISTORY AND PHYSICAL    Patient Name: John Cooper Jr.  : 1985  MRN: [id number]  Primary Care Physician:  Nancy Mendez APRN  Date of admission: 3/9/2025    Subjective   Subjective     Chief Complaint:   Chief Complaint   Patient presents with    Seizures         HPI:    John Cooper Jr. is a pleasant afebrile 40 y.o. 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    He presents to the emergency department at [facility] today with complaint of new onset seizure.  He has been admitted to the ED observation unit for further testing and evaluation.    Patient is accompanied by his parents whom he resides with.  At baseline patient ambulates via wheelchair.  This morning he reports he was in his usual state of health, he subsequently remembers waking up on the floor.  Patient's mother reports that he had prolonged episode of tonic-clonic movement.  They had difficulty rolling him onto his side while awaiting EMS.    At present patient denies any discomfort, no tongue biting or musculoskeletal injury.  He does endorse feeling fatigued.  No recent gastrointestinal or respiratory illness    Review of Systems   All systems were reviewed and negative except for: what is mentioned above    Personal History     Past Medical History:   Diagnosis Date    Allergic     Stroke        Past Surgical History:   Procedure Laterality Date    ANKLE SURGERY Right 2015    MANDIBLE SURGERY Right 2019       Family History: family history includes Bone cancer in his brother; Colon cancer in his maternal grandmother; Hypertension in his father. Otherwise pertinent FHx was reviewed and not pertinent to current issue.    Social History:  reports that he has never smoked. He has never been exposed to tobacco smoke. He has never used  smokeless tobacco. He reports current alcohol use. He reports that he does not currently use drugs.    Home Medications:  acetaminophen, amLODIPine, carvedilol, doxazosin, gabapentin, sacubitril-valsartan, spironolactone, tiZANidine, traZODone, and vitamin C    Allergies:  Allergies   Allergen Reactions    Banana Unknown - High Severity     no problems with anesthesia    Latex Angioedema    Other Swelling     BANANAS, STATES LIPS BEGAN TO SWELL AND TINGLE       Objective   Objective     Vitals:   Temp:  [96.7 °F (35.9 °C)] 96.7 °F (35.9 °C)  Heart Rate:  [116] 116  Resp:  [18] 18  BP: (184)/(96) 184/96  Flow (L/min) (Oxygen Therapy):  [6] 6  Physical Exam    Constitutional: Awake, alert   Eyes: PERRLA, sclerae anicteric, no conjunctival injection   HENT: NCAT, mucous membranes moist   Neck: Supple, no thyromegaly, no lymphadenopathy, trachea midline   Respiratory: Clear to auscultation bilaterally, nonlabored respirations    Cardiovascular: RRR, no murmurs, rubs, or gallops, palpable pedal pulses bilaterally   Gastrointestinal: Positive bowel sounds, soft, nontender, nondistended   Musculoskeletal: No bilateral ankle edema, no clubbing or cyanosis to extremities   Psychiatric: Depressed/flat affect, cooperative   Neurologic: Oriented x 3, L sided hemiplegia, speech clear   Skin: No rashes     Result Review    Result Review:  I have personally reviewed the results from the time of this admission to 3/9/2025 12:03 EDT and agree with these findings:  [x]  Laboratory list / accordion  []  Microbiology  [x]  Radiology  []  EKG/Telemetry   []  Cardiology/Vascular   []  Pathology  []  Old records  []  Other:  Most notable findings include: lactic 7.3, creatinine 1.78, ,       Assessment & Plan   The ASCVD Risk score (Elisha DK, et al., 2019) failed to calculate for the following reasons:    Risk score cannot be calculated because patient has a medical history suggesting prior/existing ASCVD    Assessment / Plan      Brief Patient Summary:  John Cooper Jr. is a 40 y.o. male who is being evaluated for new onset seizure    Active Hospital Problems:  Active Hospital Problems    Diagnosis     **New onset seizure      Plan:     New onset seizure  Ct head shows postoperative findings from remote craniotomy but no acute findings  Consult to neurology  Consult to neurosurgery  Seizure precautions  Neuro checks every 4 hours    Acute kidney injury  Creatinine 1.78, trend with AM labs  1l bolus ordered    Hypertension  Vital signs every 4 hours  Hold spironolactone for JNAET otherwise continue home medications    VTE Prophylaxis:  Mechanical VTE prophylaxis orders are present.        CODE STATUS:    Code Status (Patient has no pulse and is not breathing): CPR (Attempt to Resuscitate)  Medical Interventions (Patient has pulse or is breathing): Full Support  Level Of Support Discussed With: Patient    Admission Status:  I believe this patient meets observation status.    Electronically signed by MINESH Henriquez, 03/09/25, 12:03 PM EDT.    80 minutes has been spent by [facility] providers in the care of this patient while under observation status      I have worn appropriate PPE during this patient encounter, sanitized my hands both with entering and exiting patient's room.

## 2025-03-09 NOTE — CONSULTS
Neurology Consult Note    Consult Date: 3/9/2025    Referring MD: Dr. Perdomo    Reason for Consult I have been asked to see the patient in neurological consultation to render advice and opinion regarding new onset seizure    John Cooper Jr. is a 40 y.o. male with reported history of intracerebral hemorrhage seen previously for this at [facility] where he underwent craniectomy.  He recovered and subsequently had a cranioplasty.  He apparently had some seizure-like spells during his initial hospitalization but these never recurred.  He has done well since that time without any clinical seizures.  He sees Zack for management of spasticity.  He lives at home with his parents who assist him with mobility and toileting.  The patient at baseline has normal language but significant hemiparesis.    Last night his mother gave him some extra trazodone for daylight savings to help him sleep.  This morning he seemed to be at his neurologic baseline but then had an unprovoked generalized seizure.  This lasted 7 to 10 minutes but stopped on its own around the time when EMS arrived.  Currently he seems to be back to his neurologic baseline.    Past Medical History:   Diagnosis Date    Allergic     Stroke        Exam  /76   Pulse 76   Temp 96.7 °F (35.9 °C) (Tympanic)   Resp 18   SpO2 96%   Gen: NAD, vitals reviewed  HEENT: Small area of fluid on the scalp superficial to the area of his cranioplasty posteriorly.  MS: Awake, alert, oriented, mild left-sided neglect, language normal  CN: visual acuity grossly normal, PERRL, EOMI, mild left facial droop, mild to moderate dysarthria  Motor: 2/5 left upper extremity increased tone, 0/5 left lower extremity increased tone, moving right side spontaneously    DATA:    Lab Results   Component Value Date    GLUCOSE 156 (H) 03/09/2025    CALCIUM 9.2 03/09/2025     03/09/2025    K 4.8 03/09/2025    CO2 19.0 (L) 03/09/2025     03/09/2025    BUN 21 (H)  03/09/2025    CREATININE 1.78 (H) 03/09/2025    BCR 11.8 03/09/2025    ANIONGAP 18.0 (H) 03/09/2025     Lab Results   Component Value Date    WBC 5.03 03/09/2025    HGB 12.7 (L) 03/09/2025    HCT 38.5 03/09/2025    MCV 86.3 03/09/2025     03/09/2025       Lab review: CBC, BMP reviewed, creatinine 1.8    Imaging review: I personally reviewed his CT of the head performed in the emergency department which shows chronic post craniectomy changes with significant right temporal encephalomalacia, 4 x 1.5 cm subcutaneous CSF collection overlying craniectomy.  Radiology report reviewed.    Diagnoses:  New onset focal epilepsy  Chronic right cortical intracerebral hemorrhage  History of craniectomy and cranioplasty  Subgaleal CSF collection with pseudomeningocele    Comment: Late post ICH epilepsy with unprovoked seizure. Less likely this could have been a provoked spell from the extra dose of trazodone, but I think that is less likely given his extensive encephalomalacia    PLAN:  Keppra 500 bid, got 1g load in ED  Routine EEG  Monitor until 24H seizure free, then ok for discharge  Defer to [facility]INE if his right scalp fluid collection is cause for concern    Discussed with patient and family. Ok for discharge tomorrow from my standpoint if seizure free 24 hours.

## 2025-03-10 ENCOUNTER — APPOINTMENT (OUTPATIENT)
Dept: MRI IMAGING | Facility: HOSPITAL | Age: 40
DRG: 101 | End: 2025-03-10
Payer: COMMERCIAL

## 2025-03-10 LAB
ANION GAP SERPL CALCULATED.3IONS-SCNC: 10 MMOL/L (ref 5–15)
BASOPHILS # BLD AUTO: 0.02 10*3/MM3 (ref 0–0.2)
BASOPHILS NFR BLD AUTO: 0.4 % (ref 0–1.5)
BUN SERPL-MCNC: 17 MG/DL (ref 6–20)
BUN/CREAT SERPL: 10.3 (ref 7–25)
CALCIUM SPEC-SCNC: 8.5 MG/DL (ref 8.6–10.5)
CHLORIDE SERPL-SCNC: 108 MMOL/L (ref 98–107)
CK SERPL-CCNC: 8167 U/L (ref 20–200)
CK SERPL-CCNC: 8797 U/L (ref 20–200)
CK SERPL-CCNC: 8977 U/L (ref 20–200)
CO2 SERPL-SCNC: 22 MMOL/L (ref 22–29)
CREAT SERPL-MCNC: 1.65 MG/DL (ref 0.76–1.27)
DEPRECATED RDW RBC AUTO: 42.2 FL (ref 37–54)
EGFRCR SERPLBLD CKD-EPI 2021: 53.2 ML/MIN/1.73
EOSINOPHIL # BLD AUTO: 0.1 10*3/MM3 (ref 0–0.4)
EOSINOPHIL NFR BLD AUTO: 2 % (ref 0.3–6.2)
ERYTHROCYTE [DISTWIDTH] IN BLOOD BY AUTOMATED COUNT: 13.5 % (ref 12.3–15.4)
GLUCOSE SERPL-MCNC: 101 MG/DL (ref 65–99)
HCT VFR BLD AUTO: 35.4 % (ref 37.5–51)
HGB BLD-MCNC: 11.7 G/DL (ref 13–17.7)
IMM GRANULOCYTES # BLD AUTO: 0.01 10*3/MM3 (ref 0–0.05)
IMM GRANULOCYTES NFR BLD AUTO: 0.2 % (ref 0–0.5)
LYMPHOCYTES # BLD AUTO: 1.8 10*3/MM3 (ref 0.7–3.1)
LYMPHOCYTES NFR BLD AUTO: 35.4 % (ref 19.6–45.3)
MCH RBC QN AUTO: 28.3 PG (ref 26.6–33)
MCHC RBC AUTO-ENTMCNC: 33.1 G/DL (ref 31.5–35.7)
MCV RBC AUTO: 85.7 FL (ref 79–97)
MONOCYTES # BLD AUTO: 0.29 10*3/MM3 (ref 0.1–0.9)
MONOCYTES NFR BLD AUTO: 5.7 % (ref 5–12)
NEUTROPHILS NFR BLD AUTO: 2.87 10*3/MM3 (ref 1.7–7)
NEUTROPHILS NFR BLD AUTO: 56.3 % (ref 42.7–76)
NRBC BLD AUTO-RTO: 0 /100 WBC (ref 0–0.2)
PLATELET # BLD AUTO: 212 10*3/MM3 (ref 140–450)
PMV BLD AUTO: 12.2 FL (ref 6–12)
POTASSIUM SERPL-SCNC: 4.5 MMOL/L (ref 3.5–5.2)
RBC # BLD AUTO: 4.13 10*6/MM3 (ref 4.14–5.8)
SODIUM SERPL-SCNC: 140 MMOL/L (ref 136–145)
WBC NRBC COR # BLD AUTO: 5.09 10*3/MM3 (ref 3.4–10.8)

## 2025-03-10 PROCEDURE — A9577 INJ MULTIHANCE: HCPCS | Performed by: EMERGENCY MEDICINE

## 2025-03-10 PROCEDURE — 70553 MRI BRAIN STEM W/O & W/DYE: CPT

## 2025-03-10 PROCEDURE — 80048 BASIC METABOLIC PNL TOTAL CA: CPT | Performed by: NURSE PRACTITIONER

## 2025-03-10 PROCEDURE — 85025 COMPLETE CBC W/AUTO DIFF WBC: CPT | Performed by: NURSE PRACTITIONER

## 2025-03-10 PROCEDURE — G0378 HOSPITAL OBSERVATION PER HR: HCPCS

## 2025-03-10 PROCEDURE — 36415 COLL VENOUS BLD VENIPUNCTURE: CPT | Performed by: NURSE PRACTITIONER

## 2025-03-10 PROCEDURE — 97165 OT EVAL LOW COMPLEX 30 MIN: CPT

## 2025-03-10 PROCEDURE — 99214 OFFICE O/P EST MOD 30 MIN: CPT | Performed by: PHYSICIAN ASSISTANT

## 2025-03-10 PROCEDURE — 25810000003 SODIUM CHLORIDE 0.9 % SOLUTION

## 2025-03-10 PROCEDURE — 25810000003 SODIUM CHLORIDE 0.9 % SOLUTION: Performed by: NURSE PRACTITIONER

## 2025-03-10 PROCEDURE — 25510000002 GADOBENATE DIMEGLUMINE 529 MG/ML SOLUTION: Performed by: EMERGENCY MEDICINE

## 2025-03-10 PROCEDURE — 97535 SELF CARE MNGMENT TRAINING: CPT

## 2025-03-10 PROCEDURE — 25010000002 LEVETRIRACETAM PER 10 MG: Performed by: PSYCHIATRY & NEUROLOGY

## 2025-03-10 PROCEDURE — 82550 ASSAY OF CK (CPK): CPT

## 2025-03-10 PROCEDURE — 99221 1ST HOSP IP/OBS SF/LOW 40: CPT

## 2025-03-10 RX ORDER — DULOXETIN HYDROCHLORIDE 20 MG/1
40 CAPSULE, DELAYED RELEASE ORAL DAILY
Status: DISCONTINUED | OUTPATIENT
Start: 2025-03-10 | End: 2025-03-14 | Stop reason: HOSPADM

## 2025-03-10 RX ORDER — SODIUM CHLORIDE 9 MG/ML
150 INJECTION, SOLUTION INTRAVENOUS CONTINUOUS
Status: ACTIVE | OUTPATIENT
Start: 2025-03-10 | End: 2025-03-12

## 2025-03-10 RX ORDER — DULOXETINE 40 MG/1
40 CAPSULE, DELAYED RELEASE ORAL DAILY
COMMUNITY
Start: 2025-01-17

## 2025-03-10 RX ADMIN — TERAZOSIN HYDROCHLORIDE 5 MG: 5 CAPSULE ORAL at 21:19

## 2025-03-10 RX ADMIN — GABAPENTIN 100 MG: 100 CAPSULE ORAL at 21:19

## 2025-03-10 RX ADMIN — OXYCODONE HYDROCHLORIDE AND ACETAMINOPHEN 250 MG: 500 TABLET ORAL at 14:12

## 2025-03-10 RX ADMIN — SODIUM CHLORIDE 1000 ML: 9 INJECTION, SOLUTION INTRAVENOUS at 00:29

## 2025-03-10 RX ADMIN — SACUBITRIL AND VALSARTAN 1 TABLET: 24; 26 TABLET, FILM COATED ORAL at 21:19

## 2025-03-10 RX ADMIN — TERAZOSIN HYDROCHLORIDE 5 MG: 5 CAPSULE ORAL at 08:21

## 2025-03-10 RX ADMIN — CARVEDILOL 12.5 MG: 12.5 TABLET, FILM COATED ORAL at 21:19

## 2025-03-10 RX ADMIN — TRAZODONE HYDROCHLORIDE 50 MG: 50 TABLET ORAL at 21:19

## 2025-03-10 RX ADMIN — SODIUM CHLORIDE 150 ML/HR: 9 INJECTION, SOLUTION INTRAVENOUS at 18:35

## 2025-03-10 RX ADMIN — SODIUM CHLORIDE 150 ML/HR: 9 INJECTION, SOLUTION INTRAVENOUS at 08:10

## 2025-03-10 RX ADMIN — Medication 10 ML: at 21:18

## 2025-03-10 RX ADMIN — AMLODIPINE BESYLATE 5 MG: 5 TABLET ORAL at 21:18

## 2025-03-10 RX ADMIN — GADOBENATE DIMEGLUMINE 20 ML: 529 INJECTION, SOLUTION INTRAVENOUS at 01:57

## 2025-03-10 RX ADMIN — LEVETIRACETAM 500 MG: 100 INJECTION INTRAVENOUS at 08:21

## 2025-03-10 RX ADMIN — TIZANIDINE 4 MG: 4 TABLET ORAL at 21:19

## 2025-03-10 RX ADMIN — BACLOFEN 20 MG: 10 TABLET ORAL at 08:21

## 2025-03-10 RX ADMIN — LEVETIRACETAM 500 MG: 100 INJECTION INTRAVENOUS at 21:17

## 2025-03-10 RX ADMIN — TIZANIDINE 4 MG: 4 TABLET ORAL at 14:12

## 2025-03-10 RX ADMIN — CARVEDILOL 12.5 MG: 12.5 TABLET, FILM COATED ORAL at 08:21

## 2025-03-10 RX ADMIN — BACLOFEN 20 MG: 10 TABLET ORAL at 21:19

## 2025-03-10 RX ADMIN — Medication 10 ML: at 08:21

## 2025-03-10 RX ADMIN — TIZANIDINE 4 MG: 4 TABLET ORAL at 08:21

## 2025-03-10 RX ADMIN — BACLOFEN 20 MG: 10 TABLET ORAL at 14:12

## 2025-03-10 RX ADMIN — SODIUM CHLORIDE 1000 ML: 9 INJECTION, SOLUTION INTRAVENOUS at 10:46

## 2025-03-10 RX ADMIN — SACUBITRIL AND VALSARTAN 1 TABLET: 24; 26 TABLET, FILM COATED ORAL at 08:21

## 2025-03-10 NOTE — PLAN OF CARE
Goal Outcome Evaluation:         MRI completed. Neurology following. Seizure precautions maintained, Keppra continued. Pt is alert and oriented x4. Will continue plan of care.      Problem: Adult Inpatient Plan of Care  Goal: Plan of Care Review  Outcome: Progressing  Goal: Patient-Specific Goal (Individualized)  Outcome: Progressing  Goal: Absence of Hospital-Acquired Illness or Injury  Outcome: Progressing  Intervention: Identify and Manage Fall Risk  Recent Flowsheet Documentation  Taken 3/10/2025 0439 by Christopher Mathew RN  Safety Promotion/Fall Prevention:   safety round/check completed   room organization consistent   clutter free environment maintained   assistive device/personal items within reach   activity supervised  Taken 3/10/2025 0204 by Christopher Mathew RN  Safety Promotion/Fall Prevention: patient off unit  Taken 3/10/2025 0033 by Christopher Mathew RN  Safety Promotion/Fall Prevention:   safety round/check completed   room organization consistent   clutter free environment maintained   assistive device/personal items within reach  Taken 3/9/2025 2222 by Christopher Mathew RN  Safety Promotion/Fall Prevention:   safety round/check completed   room organization consistent   assistive device/personal items within reach   clutter free environment maintained   activity supervised  Taken 3/9/2025 2039 by Christopher Mathew RN  Safety Promotion/Fall Prevention:   safety round/check completed   room organization consistent   clutter free environment maintained   assistive device/personal items within reach  Intervention: Prevent Skin Injury  Recent Flowsheet Documentation  Taken 3/10/2025 0335 by Christopher Mathew RN  Body Position:   turned   right  Taken 3/9/2025 2039 by Christopher Mathew RN  Body Position: turned  Goal: Optimal Comfort and Wellbeing  Outcome: Progressing  Intervention: Monitor Pain and Promote Comfort  Recent Flowsheet Documentation  Taken 3/9/2025 2039 by Christopher Mathew RN  Pain Management  Interventions: pain medication given  Intervention: Provide Person-Centered Care  Recent Flowsheet Documentation  Taken 3/9/2025 2039 by Christopher Mathew RN  Trust Relationship/Rapport:   care explained   choices provided  Goal: Readiness for Transition of Care  Outcome: Progressing     Problem: Comorbidity Management  Goal: Blood Pressure in Desired Range  Outcome: Progressing  Goal: Maintenance of Seizure Control  Outcome: Progressing     Problem: Skin Injury Risk Increased  Goal: Skin Health and Integrity  Outcome: Progressing  Intervention: Optimize Skin Protection  Recent Flowsheet Documentation  Taken 3/9/2025 2039 by Christopher Mathew RN  Pressure Reduction Techniques:   weight shift assistance provided   heels elevated off bed  Head of Bed (HOB) Positioning: HOB elevated  Pressure Reduction Devices: positioning supports utilized     Problem: Fall Injury Risk  Goal: Absence of Fall and Fall-Related Injury  Outcome: Progressing  Intervention: Promote Injury-Free Environment  Recent Flowsheet Documentation  Taken 3/10/2025 0439 by Christopher Mathew RN  Safety Promotion/Fall Prevention:   safety round/check completed   room organization consistent   clutter free environment maintained   assistive device/personal items within reach   activity supervised  Taken 3/10/2025 0204 by Christopher Mathew RN  Safety Promotion/Fall Prevention: patient off unit  Taken 3/10/2025 0033 by Christopher Mathew RN  Safety Promotion/Fall Prevention:   safety round/check completed   room organization consistent   clutter free environment maintained   assistive device/personal items within reach  Taken 3/9/2025 2222 by Christopher Mathew RN  Safety Promotion/Fall Prevention:   safety round/check completed   room organization consistent   assistive device/personal items within reach   clutter free environment maintained   activity supervised  Taken 3/9/2025 2039 by Christopher Mathew RN  Safety Promotion/Fall Prevention:   safety round/check  completed   room organization consistent   clutter free environment maintained   assistive device/personal items within reach

## 2025-03-10 NOTE — PROGRESS NOTES
ED OBSERVATION PROGRESS/DISCHARGE SUMMARY    Date of Admission: 3/9/2025   LOS: 0 days   PCP: Nancy Mendez, MINESH      Hospital Outcome:     John Cooper Jr. is a 40 y.o. male presents status post witnessed tonic-clonic seizure lasting 7 to 10 minutes and required BVM support when EMS arrived.  He had 1 prior seizure-like episode during recent hospitalization for intracranial bleed and cranioplasty.  His EEG during this hospitalization was abnormal due to mild to moderate diffuse background slowing more focal right hemisphere and inner ear canal discharge.  CT head MRI brain without acute findings, there is right sided encephalomalacia consistent with recent infarct or hemorrhage as well as wallerian degeneration.  Lactic acid was initially 7.3, improved to 1.2 after IV hydration.  Initial CK was 806 and this morning went up to 8797 therefore patient received 1 L of NS and was started on NS at 150 mL/hr. surgery evaluated patient and recommends that he follows up with neurosurgery at [facility].  Neurology also reevaluated patient and plans for outpatient follow-up but and has since signed off.  We will repeat CK in a.m. and anticipate that he will be discharged home if it is trending downward.        Review of Systems:   Constitutional:  No weight changes, fever, or chills. No night sweats, no fatigue, no malaise.    Cardiovascular:  No chest pain, no palpitations, no edema.      Respiratory:  No cough, no smoke exposure, no dyspnea, no orthopnea.   Gastrointestinal:  No nausea, vomiting, or diarrhea. No constipation, or GI discomfort. No reflux pain, no anorexia, no dysphagia. No hematochezia or melena.    Neuro:  No dizziness, no headache.     Objective   Physical Exam:   Constitutional: Awake, alert. Well developed for age. Nontoxic appearing.   Eyes: PERRL x2, sclerae anicteric, no conjunctival injection. No EOM abnormalities noted.   HENT: NCAT, mucous membranes moist,   Neck: Supple, no thyromegaly, no  lymphadenopathy, trachea midline  Respiratory: Clear to auscultation bilaterally, nonlabored respirations   Cardiovascular: RRR, no murmurs, rubs, or gallops, palpable pedal pulses bilaterally. No appreciable edema.   Gastrointestinal: Positive bowel sounds, soft, nontender, not distended.   Musculoskeletal: No bilateral ankle edema, no clubbing or cyanosis to extremities. No obvious deformities.   Psychiatric: Appropriate affect, cooperative. Converses appropriately for age.   Neurologic: Oriented x 3, strength symmetric in all extremities. Cranial nerves grossly intact to confrontation, speech clear  Skin: No rashes, skin intact.     Results Review:    I have reviewed the labs, radiology results and diagnostic studies.    Results from last 7 days   Lab Units 03/09/25  0910   WBC 10*3/mm3 5.03   HEMOGLOBIN g/dL 12.7*   HEMATOCRIT % 38.5   PLATELETS 10*3/mm3 217     Results from last 7 days   Lab Units 03/09/25  0910   SODIUM mmol/L 138   POTASSIUM mmol/L 4.8   CHLORIDE mmol/L 101   CO2 mmol/L 19.0*   BUN mg/dL 21*   CREATININE mg/dL 1.78*   CALCIUM mg/dL 9.2   BILIRUBIN mg/dL 0.2   ALK PHOS U/L 96   ALT (SGPT) U/L 17   AST (SGOT) U/L 21   GLUCOSE mg/dL 156*     Imaging Results (Last 24 Hours)       Procedure Component Value Units Date/Time    MRI Brain With & Without Contrast [[id number]] Collected: 03/10/25 0230     Updated: 03/10/25 0230    Narrative:        Patient: BUD, SILVIA  Time Out: 02:29  Exam(s): MRI HEAD W WO Contrast     EXAM:    MR Head Without and With Intravenous Contrast    CLINICAL HISTORY:     Reason for exam: new onset seizure.    TECHNIQUE:    Magnetic resonance images of the head brain without and with   intravenous contrast in multiple planes.    COMPARISON:  3 9 2025    FINDINGS:    Brain:  No mass.  No acute hemorrhage.  No restricted diffusion.  Right-  sided encephalomalacia.  Right sided wallerian degeneration.  Scattered   chronic microhemorrhages.  Right-sided pachymeningeal  thickening, likely   reactive from prior surgery.    Ventricles:  No hydrocephalus.    Bones joints: Postop changes.    Sinuses:  No acute sinusitis.    Mastoid air cells:  No effusion.    Orbits:  Unremarkable.    IMPRESSION:         No acute infarct, hemorrhage, or hydrocephalus.      Impression:          Electronically signed by Sarah Ramon MD on 03-10-25 at 0229    CT Outside Films [[id number]] Resulted: 03/09/25 1145     Updated: 03/09/25 1145    Narrative:      This procedure was auto-finalized with no dictation required.    CT Outside Films [[id number]] Resulted: 03/09/25 1145     Updated: 03/09/25 1145    Narrative:      This procedure was auto-finalized with no dictation required.    CT Outside Films [[id number]] Resulted: 03/09/25 1145     Updated: 03/09/25 1145    Narrative:      This procedure was auto-finalized with no dictation required.    CT Head Without Contrast [[id number]] Collected: 03/09/25 1110     Updated: 03/09/25 1124    Narrative:      CT HEAD WO CONTRAST-     DATE OF EXAM: 3/9/2025 10:34 AM     INDICATION: Seizure. Head trauma.     COMPARISON: None available. Correlation is made with the reports from  the prior head CTs performed at an outside facility on 2/21/2024 and  1/23/2024 with the imaging not currently available for direct  comparison.     TECHNIQUE: Multiple contiguous axial images were acquired through the  head without the intravenous administration of contrast. Reformatted  coronal and sagittal sequences were also reviewed. Radiation dose  reduction techniques were utilized, including automated exposure control  and exposure modulation based on body size.     FINDINGS:  No acute intracranial hemorrhage or mass/mass effect. Postoperative  changes from right calvarial craniotomy and pterional craniectomy with a  4 cm x 1.5 cm subcutaneous/subgaleal CSF attenuation collection  overlying the pterional craniectomy consistent with a pseudomeningocele.  Large region of  encephalomalacia in the right frontal and temporal lobes  and the right basal ganglia, likely from old infarct or hemorrhage.  Associated ex vacuo dilatation of the right lateral ventricle. No  hydrocephalus. Minimal 6 mm left to right midline shift. The basilar  cisterns are patent. Gray-white matter differentiation is otherwise  grossly maintained. Limited imaging of the orbits, paranasal sinuses,  and mastoid air cells is unremarkable. No acute osseous abnormality is  identified.       Impression:         1. No acute intracranial hemorrhage or mass/mass effect.  2. Right frontal temporal and right basal ganglia encephalomalacia,  likely from old infarct or hemorrhage, with associated ex vacuo  dilatation of the right lateral ventricle and mild left to right midline  shift.  3. Postoperative changes from right calvarial craniotomy and right  pterional craniectomy with a 4 cm x 1.5 cm subcutaneous/subgaleal CSF  attenuation collection overlying the pterional craniectomy, consistent  with a pseudomeningocele. Recommend comparison to prior imaging if  available     This report was finalized on 3/9/2025 11:21 AM by Mino Estrada MD on  Workstation: [id number]       XR Hip With or Without Pelvis 2 - 3 View Left [[id number]] Collected: 03/09/25 1025     Updated: 03/09/25 1030    Narrative:      XR HIP W OR WO PELVIS 2-3 VIEW LEFT-     INDICATIONS: Pain     TECHNIQUE: 5 VIEWS INCLUDING THE PELVIS AND LEFT HIP     COMPARISON: None available     FINDINGS:     No acute fracture, erosion, or dislocation is identified. Left hip joint  space appears preserved. Mild right hip joint space narrowing is  apparent. Nonspecific elongated sclerotic focus is apparent in the right  intertrochanteric region. Follow-up/further evaluation can be obtained  as indications persist.       Impression:         As described.           This report was finalized on 3/9/2025 10:27 AM by Dr. Maco Wright M.D on Workstation: [id number]                I have reviewed the medications.     Discharge Medications        ASK your doctor about these medications        Instructions Start Date   acetaminophen 325 MG tablet  Commonly known as: TYLENOL   650 mg, Every 6 Hours PRN      amLODIPine 5 MG tablet  Commonly known as: NORVASC   5 mg, Oral, Nightly      carvedilol 12.5 MG tablet  Commonly known as: COREG   12.5 mg, Oral, 2 Times Daily With Meals      doxazosin 8 MG tablet  Commonly known as: CARDURA   8 mg, Oral, 2 Times Daily      Entresto 24-26 MG tablet  Generic drug: sacubitril-valsartan   1 tablet, Oral, 2 Times Daily      gabapentin 300 MG capsule  Commonly known as: NEURONTIN   100 mg, Nightly      spironolactone 25 MG tablet  Commonly known as: ALDACTONE   25 mg, Oral, Daily      tiZANidine 4 MG tablet  Commonly known as: ZANAFLEX   4 mg, Oral, 3 Times Daily      traZODone 50 MG tablet  Commonly known as: DESYREL    mg, Oral, Nightly      vitamin C 250 MG tablet  Commonly known as: ASCORBIC ACID   250 mg, Oral, Daily, Take with iron              ---------------------------------------------------------------------------------------------  Assessment & Plan   Assessment/Problem List    New onset seizure      Plan:    New onset seizure  Ct head shows postoperative findings from remote craniotomy but no acute findings  MRI no evidence right-sided encephalomalacia and malarian changes.  Scattered chronic microhemorrhages as well as patchy meningeal thickening.  Neurology following, loaded Keppra and okay to discharge with follow-up once seizure-free for 24 hours  Consult to neurosurgery for right scalp fluid collection.  Seizure precautions  Neuro checks every 4 hours     Acute kidney injury  Creatinine 1.78, received 1 L of IVF and improved down to 1.65 this morning     Hypertension  Vital signs every 4 hours  Hold spironolactone for JANET otherwise continue home medications     VTE Prophylaxis:  Mechanical VTE prophylaxis orders are  present      This note will serve as a progress note    Greg Garrido, MINESH 03/10/25 04:39 EDT    I have worn appropriate PPE during this patient encounter, sanitized my hands both with entering and exiting patient's room.      30 minutes has been spent by [facility] providers in the care of this patient while under observation status

## 2025-03-10 NOTE — PLAN OF CARE
Goal Outcome Evaluation:  Plan of Care Reviewed With: patient, parent           Outcome Evaluation: Pt admitted to [facility] for seizure-like activity. Medical hx includes ICH s/p craniectomy and cranioplasty in 2023 w/ residual L hemiparesis. Pt lives w/ his parents, transfers only at baseline and requires assistance for all BADLs from bed/chair level. Presents w/ significant L sides tone/spasticity which pt reports is chronic from ICH. Today, pt was SBA for supine>sit. Completed UB grooming/hygiene w/ setup. Pt politely declined transfers/OOB, scooting to HOB w/ SBA and returned to supine w/ Min A. Pt and family report pt is performing at his baseline, pt is current w/ OP therapy at [facility] and prefers to continue w/ this at d/c. No further acute OT needs at this time.    Anticipated Discharge Disposition (OT): home with 24/7 care (Cont OP at [facility] per pt request)

## 2025-03-10 NOTE — SIGNIFICANT NOTE
03/10/25 0914   OTHER   Discipline physical therapist   Rehab Time/Intention   Session Not Performed other (see comments)  (Patient worked with OT. Spoke with patient and family who feel patient is at his baseline. No skilled acute PT needs at this time. PT will sign off.)   Therapy Assessment/Plan (PT)   Criteria for Skilled Interventions Met (PT) no;no problems identified which require skilled intervention

## 2025-03-10 NOTE — CONSULTS
[facility] CONSULT NOTE    Patient name: John Cooper Jr.  Referring Provider: Amanda Mcgregor  Reason for Consultation: prior hemorrhagic cva, new onset seizure    Patient Care Team:  Nancy Mendez APRN as PCP - General (Nurse Practitioner)    Chief complaint: seizure    Subjective .     History of present illness:    Patient is a 40 y.o.  male with HTN, HLD, heart failure, and FAUSTINO who underwent craniectomy at [facility] for hemorrhagic stroke. He has since recovered and had follow-up cranioplasty performed February 2024. He had witnessed seizure at home yesterday that lasted about 10 minutes per family. He had seizures before with his prior hemorrhage but apparently they have been well controlled since. He has since been seen by neurology who have since started him on Keppra. Elevated CK this morning, getting IVF. Neurosurgery consulted given history of craniectomy/plasty.     Review of Systems  Review of Systems   Constitutional:  Positive for fatigue.   Eyes:  Negative for photophobia and visual disturbance.   Neurological:  Positive for seizures. Negative for headaches.       History  PAST MEDICAL HISTORY  Past Medical History:   Diagnosis Date    Allergic     Stroke        PAST SURGICAL HISTORY  Past Surgical History:   Procedure Laterality Date    ANKLE SURGERY Right 2015    MANDIBLE SURGERY Right 2019       FAMILY HISTORY  Family History   Problem Relation Age of Onset    Hypertension Father     Bone cancer Brother     Colon cancer Maternal Grandmother        SOCIAL HISTORY  Social History     Tobacco Use    Smoking status: Never     Passive exposure: Never    Smokeless tobacco: Never    Tobacco comments:     Social cigar smoker   Vaping Use    Vaping status: Never Used   Substance Use Topics    Alcohol use: Yes     Comment: social    Drug use: Not Currently       Allergies:  Banana, Latex, and Other    MEDICATIONS:  Medications Prior to Admission   Medication Sig Dispense Refill Last Dose/Taking     acetaminophen (TYLENOL) 325 MG tablet Take 2 tablets by mouth Every 6 (Six) Hours As Needed for Mild Pain.   Past Month    amLODIPine (NORVASC) 5 MG tablet Take 1 tablet by mouth Every Night. 90 tablet 3 3/8/2025    carvedilol (COREG) 12.5 MG tablet Take 1 tablet by mouth 2 (Two) Times a Day With Meals. 180 tablet 1 3/8/2025 at  8:00 PM    doxazosin (CARDURA) 8 MG tablet Take 1 tablet by mouth 2 (Two) Times a Day. 180 tablet 1 3/8/2025    gabapentin (NEURONTIN) 300 MG capsule Take 100 mg by mouth Every Night.   3/8/2025    sacubitril-valsartan (Entresto) 24-26 MG tablet Take 1 tablet by mouth 2 (Two) Times a Day. 180 tablet 3 3/8/2025    spironolactone (ALDACTONE) 25 MG tablet Take 1 tablet by mouth Daily. 90 tablet 1 3/9/2025 at 11:30 AM    tiZANidine (ZANAFLEX) 4 MG tablet Take 1 tablet by mouth 3 (Three) Times a Day. 180 tablet 1 3/8/2025 at  8:30 PM    traZODone (DESYREL) 50 MG tablet Take 1-2 tablets by mouth Every Night. 180 tablet 1 3/8/2025    vitamin C (ASCORBIC ACID) 250 MG tablet Take 1 tablet by mouth Daily. Take with iron 90 tablet 0 3/8/2025 at  2:00 PM         Current Facility-Administered Medications:     acetaminophen (TYLENOL) tablet 650 mg, 650 mg, Oral, Q6H PRN, Herth, Amanda, APRN, 650 mg at 03/09/25 1937    amLODIPine (NORVASC) tablet 5 mg, 5 mg, Oral, Nightly, Herth, Amanda, APRN, 5 mg at 03/09/25 2039    ascorbic acid (VITAMIN C) tablet 250 mg, 250 mg, Oral, Daily, Herth, Amanda, APRN, 250 mg at 03/09/25 1830    baclofen (LIORESAL) tablet 20 mg, 20 mg, Oral, TID, Herth, Amanda, APRN, 20 mg at 03/10/25 0821    sennosides-docusate (PERICOLACE) 8.6-50 MG per tablet 2 tablet, 2 tablet, Oral, BID PRN **AND** polyethylene glycol (MIRALAX) packet 17 g, 17 g, Oral, Daily PRN **AND** bisacodyl (DULCOLAX) EC tablet 5 mg, 5 mg, Oral, Daily PRN **AND** bisacodyl (DULCOLAX) suppository 10 mg, 10 mg, Rectal, Daily PRN, Amanda Proctor, MINESH    carvedilol (COREG) tablet 12.5 mg, 12.5 mg, Oral, BID With Meals,  Amanda Proctor APRN, 12.5 mg at 03/10/25 0821    gabapentin (NEURONTIN) capsule 100 mg, 100 mg, Oral, Nightly, Amanda Proctor APRN, 100 mg at 03/09/25 2038    levETIRAcetam (KEPPRA) injection 500 mg, 500 mg, Intravenous, Q12H, Robi Vasquez MD, 500 mg at 03/10/25 0821    ondansetron ODT (ZOFRAN-ODT) disintegrating tablet 4 mg, 4 mg, Oral, Q6H PRN **OR** ondansetron (ZOFRAN) injection 4 mg, 4 mg, Intravenous, Q6H PRN, Amanda Proctor APRN    sacubitril-valsartan (ENTRESTO) 24-26 MG tablet 1 tablet, 1 tablet, Oral, BID, Amanda Proctor APRN, 1 tablet at 03/10/25 0821    Insert Peripheral IV, , , Once **AND** sodium chloride 0.9 % flush 10 mL, 10 mL, Intravenous, PRN, Amanda Proctor, APRTREY    sodium chloride 0.9 % flush 10 mL, 10 mL, Intravenous, Q12H, Amanda Proctor APRN, 10 mL at 03/10/25 0821    sodium chloride 0.9 % flush 10 mL, 10 mL, Intravenous, PRN, Amanda Proctor, APRTREY    sodium chloride 0.9 % infusion 40 mL, 40 mL, Intravenous, PRN, Amanda Proctor, APRTREY    sodium chloride 0.9 % infusion, 150 mL/hr, Intravenous, Continuous, Greg Garrido APRN, Last Rate: 150 mL/hr at 03/10/25 0810, 150 mL/hr at 03/10/25 0810    [Held by provider] spironolactone (ALDACTONE) tablet 25 mg, 25 mg, Oral, Daily, Amanda Proctor APRN    terazosin (HYTRIN) capsule 5 mg, 5 mg, Oral, Q12H, Amanda Proctor APRN, 5 mg at 03/10/25 0821    tiZANidine (ZANAFLEX) tablet 4 mg, 4 mg, Oral, TID, Amanda Proctor APRN, 4 mg at 03/10/25 0821    traZODone (DESYREL) tablet 50 mg, 50 mg, Oral, Nightly, Amanda Proctor APRN, 50 mg at 03/09/25 2038    COMORBID CONDITIONS:  Craniectomy, Heart failure, History of Intracranial Hemorrhage, Hypertension, and FAUSTINO, HLD, and Seizure    Objective     Results Review:  LABS:  Results from last 7 days   Lab Units 03/10/25  0530 03/09/25  0910   WBC 10*3/mm3 5.09 5.03   HEMOGLOBIN g/dL 11.7* 12.7*   HEMATOCRIT % 35.4* 38.5   PLATELETS 10*3/mm3 212 217     Results from last 7 days   Lab Units 03/10/25  0530 03/09/25  0910    SODIUM mmol/L 140 138   POTASSIUM mmol/L 4.5 4.8   CHLORIDE mmol/L 108* 101   CO2 mmol/L 22.0 19.0*   BUN mg/dL 17 21*   CREATININE mg/dL 1.65* 1.78*   CALCIUM mg/dL 8.5* 9.2   BILIRUBIN mg/dL  --  0.2   ALK PHOS U/L  --  96   ALT (SGPT) U/L  --  17   AST (SGOT) U/L  --  21   GLUCOSE mg/dL 101* 156*         DIAGNOSTICS:  MRI Brain With & Without Contrast  Result Date: 3/10/2025  Patient: LAYLA FARRELLY  Time Out: 02:29 Exam(s): MRI HEAD W WO Contrast EXAM:   MR Head Without and With Intravenous Contrast CLINICAL HISTORY:    Reason for exam: new onset seizure. TECHNIQUE:   Magnetic resonance images of the head brain without and with intravenous contrast in multiple planes. COMPARISON: 3 9 2025 FINDINGS:   Brain:  No mass.  No acute hemorrhage.  No restricted diffusion.  Right- sided encephalomalacia.  Right sided wallerian degeneration.  Scattered chronic microhemorrhages.  Right-sided pachymeningeal thickening, likely reactive from prior surgery.   Ventricles:  No hydrocephalus.   Bones joints: Postop changes.   Sinuses:  No acute sinusitis.   Mastoid air cells:  No effusion.   Orbits:  Unremarkable. IMPRESSION:       No acute infarct, hemorrhage, or hydrocephalus.     Electronically signed by Sarah Ramon MD on 03-10-25 at 0229    EEG  Result Date: 3/9/2025  Date of onset: 3/9/2025 Date of offset: 3/9/2025 Indication: 40 year old man with new onset seizure.  Technical description:  This is a 21 channel digital EEG recording that began on 1638 and ended on 1704.  Electrodes are placed based on the 10-20 international electrode placement system.  Background:  The EEG recording demonstrates mild to moderate diffuse background slowing with mainly theta and delta frequencies.  7-8 Hz frequencies are present posteriorly over the left hemisphere with more focal slowing noted over the right hemisphere.  The patient enters the drowsy state, but no well formed stage 2 sleep architecture is present.  Hyperventilation was  not performed but photic stimulation was performed.  There is focal right hemispheric slowing with interictal discharges maximum in the right parietal-temporal head region.  No seizure is recorded. The EKG monitor shows a heart rate is around 70 beats per minute. Clinical interpretation:  This routine EEG is abnormal due to the presence of mild to moderate diffuse background slowing with more focal right hemispheric slowing and interictal discharges.  The results of this study may indicate mild to moderate encephalopathy, medication effect, excessive drowsiness or bi-hemispheric dysfunction.  The focal right hemispheric slowing is indicative of underlying structural or functional abnormality and the right parietal-temporal discharges place patient at increased risk for focal and secondarily generalized seizures.  No seizure was recorded.  Findings ere discussed with ordering neuro-hospitalist.  Careful clinical correlation is advised.       CT Head Without Contrast  Result Date: 3/9/2025  CT HEAD WO CONTRAST-  DATE OF EXAM: 3/9/2025 10:34 AM  INDICATION: Seizure. Head trauma.  COMPARISON: None available. Correlation is made with the reports from the prior head CTs performed at an outside facility on 2/21/2024 and 1/23/2024 with the imaging not currently available for direct comparison.  TECHNIQUE: Multiple contiguous axial images were acquired through the head without the intravenous administration of contrast. Reformatted coronal and sagittal sequences were also reviewed. Radiation dose reduction techniques were utilized, including automated exposure control and exposure modulation based on body size.  FINDINGS: No acute intracranial hemorrhage or mass/mass effect. Postoperative changes from right calvarial craniotomy and pterional craniectomy with a 4 cm x 1.5 cm subcutaneous/subgaleal CSF attenuation collection overlying the pterional craniectomy consistent with a pseudomeningocele. Large region of  encephalomalacia in the right frontal and temporal lobes and the right basal ganglia, likely from old infarct or hemorrhage. Associated ex vacuo dilatation of the right lateral ventricle. No hydrocephalus. Minimal 6 mm left to right midline shift. The basilar cisterns are patent. Gray-white matter differentiation is otherwise grossly maintained. Limited imaging of the orbits, paranasal sinuses, and mastoid air cells is unremarkable. No acute osseous abnormality is identified.       1. No acute intracranial hemorrhage or mass/mass effect. 2. Right frontal temporal and right basal ganglia encephalomalacia, likely from old infarct or hemorrhage, with associated ex vacuo dilatation of the right lateral ventricle and mild left to right midline shift. 3. Postoperative changes from right calvarial craniotomy and right pterional craniectomy with a 4 cm x 1.5 cm subcutaneous/subgaleal CSF attenuation collection overlying the pterional craniectomy, consistent with a pseudomeningocele. Recommend comparison to prior imaging if available  This report was finalized on 3/9/2025 11:21 AM by Mino Estrada MD on Workstation: [id number]             Results Review:   I reviewed the patient's new clinical results.  I personally viewed and interpreted the patient's Chart, labs, medications, and imaging has been reviewed by and discussed with Dr. Pinedo     Vital Signs   Temp:  [97.6 °F (36.4 °C)-98.4 °F (36.9 °C)] 98 °F (36.7 °C)  Heart Rate:  [73-92] 83  Resp:  [18] 18  BP: (106-141)/(74-99) 136/92    Physical Exam:  Physical Exam  Vitals reviewed.   Constitutional:       General: He is awake. He is not in acute distress.     Appearance: He is not ill-appearing.      Comments: Drowsy. Awakens for a few minutes but easily falls back to sleep   HENT:      Head:      Comments: Large well healed right cranial scar. Small soft raised area near incision, non-tender to touch.  Eyes:      Extraocular Movements: Extraocular movements intact.       Pupils: Pupils are equal, round, and reactive to light.   Pulmonary:      Effort: Pulmonary effort is normal.   Skin:     General: Skin is warm and dry.   Neurological:      GCS: GCS eye subscore is 4. GCS verbal subscore is 5. GCS motor subscore is 6.      Cranial Nerves: Dysarthria present.       Neurological Exam  Mental Status  Awake and drowsy. Oriented to person, place and time. Recent and remote memory are intact. Mild dysarthria present. Language is fluent with no aphasia. Attention and concentration are normal.    Cranial Nerves  CN III, IV, VI: Extraocular movements intact bilaterally. Pupils equal round and reactive to light bilaterally.  Some left facial droop.    Motor    Moves all extremities. Weaker on the left. .    Sensory  Sensation is intact to light touch, pinprick, vibration and proprioception in all four extremities.    Gait    Not tested.      Assessment & Plan       New onset seizure      Problem List Items Addressed This Visit       * (Principal) New onset seizure - Primary     Other Visit Diagnoses         Left leg pain               Pleasant 40-year-old male with complex medical history and s/p craniectomy and cranioplasty at [facility] from [facility] last year, presents with new onset seizure.  Started on Keppra per neurology and no additional seizures noted.  He is drowsy on exam but otherwise at baseline.  No acute findings on MRI or CT head but small area of fluid collection overlying craniectomy site consistent with pseudomeningocele.  Cranial incision is well-healed with small soft area of fluid underneath scalp near incision.  No need for urgent neurosurgical intervention, recommend that he follow-up with his neurosurgeons at [facility] for this. Otherwise will defer seizure management to neurology. No further recommendations at this time.  Please feel free to contact us for any questions or concerns.     PLAN:     Seizure management per neurology  Recommend to f/u with neurosurgeon at [facility]  "  Elevated CK per primary    I discussed the patient's findings and my recommendations with patient, family, primary care team, and Dr. Pinedo    During patient visit, I utilized appropriate personal protective equipment including gloves and mask.  Mask used was standard procedure mask. Appropriate PPE was worn during the entire visit.  Hand hygiene was completed before and after.     Mami Cantrell, APRN  03/10/25  08:42 EDT    \"Dictated utilizing Dragon dictation\".     "

## 2025-03-10 NOTE — PROGRESS NOTES
This is a gentleman that was admitted yesterday for new onset seizure.  This is a 40-year-old male who in November 2023 had a cerebral hemorrhage on the right with craniectomy.  Approximately 1 year later had a cranioplasty.  He is nonambulatory at baseline he has left-sided weakness at baseline.  Has chronic cognitive impairment.  He had a new onset seizure yesterday that was prolonged lasted up to 10 minutes.  He was admitted for neurologic evaluation for new onset seizure.  Seeing him this morning with his parents at bedside he is acting at baseline.  He is arousable he is able to tell me his name.  He denies any complaint at this time.  He is had no events throughout the night and has been seizure-free.  On exam vital signs are unremarkable.  He is a male that looks older than his stated age he is lying in bed.  He is morbidly obese.  On palpation to his scalp to the right posterior lateral aspect I do not feel any obvious bulging or any drainage.  There is no erythema or discharge.  His heart is regular rate and rhythm lungs are clear to auscultation with no respiratory distress abdomen obese soft nontender  Neurologically he is got left hemiparesis with some chronic cognitive impairment.  Assessment and plan.  New onset seizure.  Neurology has seen and evaluated him and we have reviewed their note.  We started the patient on IV Keppra.  Patient's lactic acid yesterday was 7.3 it is gone down to 1.2 very likely from seizure.  Patient has some renal insufficiency with a creatinine of 1.65 which is fairly chronic.  His CT of the head and MRI has been done.  He has chronic significant encephalomalacia as well as postsurgical changes and a 4 x 1.5 cm subcutaneous CSF collection.  No obvious acute hemorrhage.  His MRI showed no acute hemorrhage or infarct and chronic changes.  Patient's creatinine kinase went from 800-8000.  Very likely related to the seizures.  Will sukumar continue with IV fluids and continue to  monitor creatinine.  Urinalysis yesterday showed a little bit of protein and small amount of blood.  Neurosurgery has been consulted to weigh in on the fluid collection and neurology is following.  I anticipate he is going to need to be here for at least 1 more day until we see some improvement of the CK.  Talk with the patient as well as the family which would be the parents at bedside.  All questions answered at this time.

## 2025-03-10 NOTE — PROGRESS NOTES
DOS: 3/10/2025  NAME: John Cooper Jr.   : 1985  PCP: Nancy Mendez APRN  Chief Complaint   Patient presents with    Seizures       Chief complaint: seizure  Subjective: Family at bedside.  No further seizure events.  Tolerating keppra.  CK >8k    Objective:  Vital signs: /92 (BP Location: Right arm, Patient Position: Sitting)   Pulse 83   Temp 98 °F (36.7 °C) (Oral)   Resp 18   SpO2 97%      Gen: NAD, vitals reviewed  MS: oriented x3, recent/remote memory intact, normal attention/concentration, language intact, no neglect.  CN: visual acuity grossly normal, PERRL, EOMI, L facial , + dysarthria  Motor: 4/5 LUE delt, biceps, 2/5 distal LUE, 4/5 LLE, 5/5 on R  Sensory: intact to light touch all 4 ext.  Reflexes: +4 L patella, +3 L biceps and brachioradialis    ROS:  No weakness, numbness  No fevers, chills      Laboratory results:  Lab Results   Component Value Date    GLUCOSE 101 (H) 03/10/2025    CALCIUM 8.5 (L) 03/10/2025     03/10/2025    K 4.5 03/10/2025    CO2 22.0 03/10/2025     (H) 03/10/2025    BUN 17 03/10/2025    CREATININE 1.65 (H) 03/10/2025    BCR 10.3 03/10/2025    ANIONGAP 10.0 03/10/2025     Lab Results   Component Value Date    WBC 5.09 03/10/2025    HGB 11.7 (L) 03/10/2025    HCT 35.4 (L) 03/10/2025    MCV 85.7 03/10/2025     03/10/2025     Lab Results   Component Value Date     (H) 2023            Review of labs: CK 8700    Review and interpretation of imaging:   Head CT with no hemorrhage or acute finding there is right frontal temporal and right basal ganglia encephalomalacia with ex vacuo dilation of right lateral ventricle right calvarial craniotomy and right pterional craniectomy 4 by 1.5 cm CSF attenuation over pterional crani site    MRI brain w and w/o reviewed with pt and family-right sided wallerian degeneration, scattered microhemorrhages, encephalomalacia of right basal ganglia to temporal lobe pachymeningeal thickening reactive from  surgery    Workup to date:    Diagnoses:  Post stroke epilepsy  H/o R BG ICH  Rhabdomyolysis  HTN    Impression: 40-year-old AAM with past medical history of hypertension, hyperlipidemia, nonischemic cardiomyopathy, heart failure, reduced ejection fraction, FAUSTINO, prior basal ganglia ICH with residual left hemiparesis.  He comes to the emergency room over the weekend with new onset seizure.  Head CT showed postoperative changes no acute finding.  There was concern for CSF attenuation over the prior crani site.  Neurology was consulted for seizures and was started on Keppra.  He is tolerating well with no further events.  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  Neurosurgery consulted for abnormal imaging and no further inpatient recommendations from their standpoint.  Other things include JANET with elevated CK    Will send electronic message to f/u in clinic with epileptologist.  Seizure precautions and first-aid were described    No further neuro inpt recommendations      Thank you for this consultation.  Discussed above plan with neuro attending, Dr. Treviño who agrees with above plan.  Neurology team is available for concerns or questions.

## 2025-03-10 NOTE — THERAPY EVALUATION
Patient Name: John Cooper Jr.  : 1985    MRN: [id number]                              Today's Date: 3/10/2025       Admit Date: 3/9/2025    Visit Dx:     ICD-10-CM ICD-9-CM   1. New onset seizure  R56.9 780.39   2. Left leg pain  M79.605 729.5     Patient Active Problem List   Diagnosis    Primary hypertension    Mixed hyperlipidemia    Abnormal ECG    Class 1 obesity due to excess calories without serious comorbidity with body mass index (BMI) of 33.0 to 33.9 in adult    NICM (nonischemic cardiomyopathy)    Acute HFrEF (heart failure with reduced ejection fraction)    FAUSTINO (obstructive sleep apnea)    Hemorrhagic stroke    Central sleep apnea secondary to cerebrovascular accident (CVA)    Psychophysiological insomnia    Nerve pain    New onset seizure     Past Medical History:   Diagnosis Date    Allergic     Stroke      Past Surgical History:   Procedure Laterality Date    ANKLE SURGERY Right 2015    MANDIBLE SURGERY Right 2019      General Information       Row Name 03/10/25 1150          OT Time and Intention    Subjective Information no complaints  -KG     Document Type evaluation  -KG     Mode of Treatment individual therapy;occupational therapy  -KG     Patient Effort good  -KG     Symptoms Noted During/After Treatment none  -KG       Row Name 03/10/25 1150          General Information    Patient Profile Reviewed yes  -KG     Prior Level of Function --  Assist for all BADLs from bed/chair level. Transfers only at baseline w/ Mod (I), however family helps as needed  -KG     Barriers to Rehab none identified  -KG       Row Name 03/10/25 1150          Living Environment    Current Living Arrangements home  -KG     People in Home parent(s)  -KG       Row Name 03/10/25 1150          Home Main Entrance    Number of Stairs, Main Entrance none  -KG       Row Name 03/10/25 1150          Cognition    Orientation Status (Cognition) oriented x 4  -KG       Row Name 03/10/25 1150          Safety Issues/Impairments  Affecting Functional Mobility    Impairments Affecting Function (Mobility) muscle tone abnormal;range of motion (ROM);strength  -KG               User Key  (r) = Recorded By, (t) = Taken By, (c) = Cosigned By      Initials Name Provider Type    Cristóbal Rojas OT Occupational Therapist                     Mobility/ADL's       Row Name 03/10/25 1151          Bed Mobility    Bed Mobility supine-sit;sit-supine  -KG     Supine-Sit [place] (Bed Mobility) standby assist  -KG     Sit-Supine [place] (Bed Mobility) minimum assist (75% patient effort)  -KG       Row Name 03/10/25 1151          Transfers    Comment, (Transfers) pt declined at this time  -KG       Row Name 03/10/25 1151          Functional Mobility    Functional Mobility- Comment pt declined at this time -- tfs only at baseline  -KG       Row Name 03/10/25 1151          Activities of Daily Living    BADL Assessment/Intervention grooming  -KG       Row Name 03/10/25 1151          Grooming Assessment/Training    [place] Level (Grooming) wash face, hands;set up  -KG     Position (Grooming) edge of bed sitting  -KG               User Key  (r) = Recorded By, (t) = Taken By, (c) = Cosigned By      Initials Name Provider Type    Cristóbal Rojas OT Occupational Therapist                   Obj/Interventions       Row Name 03/10/25 1153          Sensory Assessment (Somatosensory)    Sensory Assessment (Somatosensory) sensation intact  -KG       Row Name 03/10/25 1153          Vision Assessment/Intervention    Visual Impairment/Limitations WNL  -KG       Row Name 03/10/25 1153          Range of Motion Comprehensive    Comment, General Range of Motion RUE/RLE WNL ; LUE/LLE impaired, increased spasticity and tone from prior ICH  -KG       Row Name 03/10/25 1153          Strength Comprehensive (MMT)    Comment, General Manual Muscle Testing (MMT) Assessment RUE 4+/5 grossly, LUE 1/5 chronically  -KG       Row Name 03/10/25 1153          Motor Skills     Motor Skills functional endurance  -KG     Functional Endurance WFL  -KG       Row Name 03/10/25 1153          Balance    Balance Assessment sitting static balance;sitting dynamic balance  -KG     Static Sitting Balance standby assist  -KG     Dynamic Sitting Balance standby assist  -KG     Position, Sitting Balance sitting edge of bed  -KG     Balance Interventions sitting;static;dynamic;occupation based/functional task  -KG               User Key  (r) = Recorded By, (t) = Taken By, (c) = Cosigned By      Initials Name Provider Type    Cristóbal Rojas OT Occupational Therapist                   Goals/Plan       Row Name 03/10/25 1200          Bed Mobility Goal 1 (OT)    Activity/Assistive Device (Bed Mobility Goal 1, OT) supine to sit  -KG     [place] Level/Cues Needed (Bed Mobility Goal 1, OT) standby assist  -KG     Time Frame (Bed Mobility Goal 1, OT) short term goal (STG);2 weeks  -KG     Progress/Outcomes (Bed Mobility Goal 1, OT) new goal;goal met  -KG               User Key  (r) = Recorded By, (t) = Taken By, (c) = Cosigned By      Initials Name Provider Type    Cristóbal Rojas OT Occupational Therapist                   Clinical Impression       Row Name 03/10/25 1155          Pain Assessment    Pretreatment Pain Rating 0/10 - no pain  -KG     Posttreatment Pain Rating 0/10 - no pain  -KG       Row Name 03/10/25 1155          Plan of Care Review    Plan of Care Reviewed With patient;parent  -KG     Outcome Evaluation Pt admitted to [facility] for seizure-like activity. Medical hx includes ICH s/p craniectomy and cranioplasty in 2023 w/ residual L hemiparesis. Pt lives w/ his parents, transfers only at baseline and requires assistance for all BADLs from bed/chair level. Presents w/ significant L sides tone/spasticity which pt reports is chronic from ICH. Today, pt was SBA for supine>sit. Completed UB grooming/hygiene w/ setup. Pt politely declined transfers/OOB, scooting to HOB w/ SBA and returned to  supine w/ Min A. Pt and family report pt is performing at his baseline, pt is current w/ OP therapy at [facility] and prefers to continue w/ this at d/c. No further acute OT needs at this time.  -KG       Row Name 03/10/25 1155          Therapy Assessment/Plan (OT)    Therapy Frequency (OT) evaluation only  -KG       Row Name 03/10/25 1155          Therapy Plan Review/Discharge Plan (OT)    Anticipated Discharge Disposition (OT) home with 24/7 care  Cont OP at [facility] per pt request  -KG       Row Name 03/10/25 1155          Vital Signs    Pre Patient Position Supine  -KG     Intra Patient Position Sitting  -KG     Post Patient Position Supine  -KG       Row Name 03/10/25 1155          Positioning and Restraints    Pre-Treatment Position in bed  -KG     Post Treatment Position bed  -KG     In Bed notified nsg;supine;call light within reach;encouraged to call for assist;exit alarm on;with family/caregiver  -KG               User Key  (r) = Recorded By, (t) = Taken By, (c) = Cosigned By      Initials Name Provider Type    KG Cristóbal Venegas, OT Occupational Therapist                   Outcome Measures       Row Name 03/10/25 1200          How much help from another is currently needed...    Putting on and taking off regular lower body clothing? 2  -KG     Bathing (including washing, rinsing, and drying) 2  -KG     Toileting (which includes using toilet bed pan or urinal) 2  -KG     Putting on and taking off regular upper body clothing 2  -KG     Taking care of personal grooming (such as brushing teeth) 3  -KG     Eating meals 3  -KG     AM-PAC 6 Clicks Score (OT) 14  -KG       Row Name 03/10/25 0810          How much help from another person do you currently need...    Turning from your back to your side while in flat bed without using bedrails? 1  -JJ     Moving from lying on back to sitting on the side of a flat bed without bedrails? 1  -JJ     Moving to and from a bed to a chair (including a wheelchair)? 1  -JJ      Standing up from a chair using your arms (e.g., wheelchair, bedside chair)? 1  -JJ     Climbing 3-5 steps with a railing? 1  -JJ     To walk in hospital room? 1  -JJ     AM-PAC 6 Clicks Score (PT) 6  -JJ       Row Name 03/10/25 1200          Modified Newberry Scale    Modified Santos Scale 4 - Moderately severe disability.  Unable to walk without assistance, and unable to attend to own bodily needs without assistance.  -KG       Row Name 03/10/25 1200          Functional Assessment    Outcome Measure Options AM-PAC 6 Clicks Daily Activity (OT);Modified Newberry  -KG               User Key  (r) = Recorded By, (t) = Taken By, (c) = Cosigned By      Initials Name Provider Type    Cristóbal Rojas OT Occupational Therapist    Shima Cleveland, RN Registered Nurse                    Occupational Therapy Education       Title: PT OT SLP Therapies (Not Started)       Topic: Occupational Therapy (Not Started)       Point: ADL training (Not Started)       Description:   Instruct learner(s) on proper safety adaptation and remediation techniques during self care or transfers.   Instruct in proper use of assistive devices.                  Learner Progress:  Not documented in this visit.              Point: Home exercise program (Not Started)       Description:   Instruct learner(s) on appropriate technique for monitoring, assisting and/or progressing therapeutic exercises/activities.                  Learner Progress:  Not documented in this visit.              Point: Precautions (Not Started)       Description:   Instruct learner(s) on prescribed precautions during self-care and functional transfers.                  Learner Progress:  Not documented in this visit.              Point: Body mechanics (Not Started)       Description:   Instruct learner(s) on proper positioning and spine alignment during self-care, functional mobility activities and/or exercises.                  Learner Progress:  Not documented in this visit.                                   OT Recommendation and Plan  Therapy Frequency (OT): evaluation only  Plan of Care Review  Plan of Care Reviewed With: patient, parent  Outcome Evaluation: Pt admitted to [facility] for seizure-like activity. Medical hx includes ICH s/p craniectomy and cranioplasty in 2023 w/ residual L hemiparesis. Pt lives w/ his parents, transfers only at baseline and requires assistance for all BADLs from bed/chair level. Presents w/ significant L sides tone/spasticity which pt reports is chronic from ICH. Today, pt was SBA for supine>sit. Completed UB grooming/hygiene w/ setup. Pt politely declined transfers/OOB, scooting to HOB w/ SBA and returned to supine w/ Min A. Pt and family report pt is performing at his baseline, pt is current w/ OP therapy at [facility] and prefers to continue w/ this at d/c. No further acute OT needs at this time.     Time Calculation:   Evaluation Complexity (OT)  Review Occupational Profile/Medical/Therapy History Complexity: brief/low complexity  Assessment, Occupational Performance/Identification of Deficit Complexity: 1-3 performance deficits  Clinical Decision Making Complexity (OT): problem focused assessment/low complexity  Overall Complexity of Evaluation (OT): low complexity     Time Calculation- OT       Row Name 03/10/25 1201             Time Calculation- OT    OT Start Time 0820  -KG      OT Stop Time 0844  -KG      OT Time Calculation (min) 24 min  -KG      Total Timed Code Minutes- OT 14 minute(s)  -KG      OT Non-Billable Time (min) 10 min  -KG      OT Received On 03/10/25  -KG         Timed Charges    97733 - OT Self Care/Mgmt Minutes 14  -KG         Untimed Charges    OT Eval/Re-eval Minutes 10  -KG         Total Minutes    Timed Charges Total Minutes 14  -KG      Untimed Charges Total Minutes 10  -KG       Total Minutes 24  -KG                User Key  (r) = Recorded By, (t) = Taken By, (c) = Cosigned By      Initials Name Provider Type    Cristóbal Rojas  OT Occupational Therapist                  Therapy Charges for Today       Code Description Service Date Service Provider Modifiers Qty    24645791014 HC OT SELF CARE/MGMT/TRAIN EA 15 MIN 3/10/2025 Cristóbal Venegas OT GO 1    [id number]  OT EVAL LOW COMPLEXITY 2 3/10/2025 Cristóbal Venegas OT GO 1                 Cristóbal Venegas OT  3/10/2025

## 2025-03-10 NOTE — PLAN OF CARE
Goal Outcome Evaluation:  Plan of Care Reviewed With: patient, family        Progress: improving  Outcome Evaluation: pt came in for seizure like activity. Seen by PT and OT today. Neurology and neurosurgery seen today. CK check every 6hrs, still on IVF of NS at 150cc/hr.

## 2025-03-11 PROBLEM — M62.82 RHABDOMYOLYSIS: Status: ACTIVE | Noted: 2025-03-11

## 2025-03-11 LAB
ANION GAP SERPL CALCULATED.3IONS-SCNC: 12.1 MMOL/L (ref 5–15)
BUN SERPL-MCNC: 12 MG/DL (ref 6–20)
BUN/CREAT SERPL: 8.9 (ref 7–25)
CALCIUM SPEC-SCNC: 8.3 MG/DL (ref 8.6–10.5)
CHLORIDE SERPL-SCNC: 108 MMOL/L (ref 98–107)
CK SERPL-CCNC: 8652 U/L (ref 20–200)
CK SERPL-CCNC: 8786 U/L (ref 20–200)
CK SERPL-CCNC: 8885 U/L (ref 20–200)
CO2 SERPL-SCNC: 21.9 MMOL/L (ref 22–29)
CREAT SERPL-MCNC: 1.35 MG/DL (ref 0.76–1.27)
DEPRECATED RDW RBC AUTO: 42.9 FL (ref 37–54)
EGFRCR SERPLBLD CKD-EPI 2021: 67.6 ML/MIN/1.73
ERYTHROCYTE [DISTWIDTH] IN BLOOD BY AUTOMATED COUNT: 13.7 % (ref 12.3–15.4)
GLUCOSE SERPL-MCNC: 96 MG/DL (ref 65–99)
HCT VFR BLD AUTO: 37.1 % (ref 37.5–51)
HGB BLD-MCNC: 12.2 G/DL (ref 13–17.7)
MCH RBC QN AUTO: 28.2 PG (ref 26.6–33)
MCHC RBC AUTO-ENTMCNC: 32.9 G/DL (ref 31.5–35.7)
MCV RBC AUTO: 85.9 FL (ref 79–97)
PLATELET # BLD AUTO: 191 10*3/MM3 (ref 140–450)
PMV BLD AUTO: 12.1 FL (ref 6–12)
POTASSIUM SERPL-SCNC: 4 MMOL/L (ref 3.5–5.2)
RBC # BLD AUTO: 4.32 10*6/MM3 (ref 4.14–5.8)
SODIUM SERPL-SCNC: 142 MMOL/L (ref 136–145)
WBC NRBC COR # BLD AUTO: 5.03 10*3/MM3 (ref 3.4–10.8)

## 2025-03-11 PROCEDURE — 25010000002 ONDANSETRON PER 1 MG: Performed by: NURSE PRACTITIONER

## 2025-03-11 PROCEDURE — 82550 ASSAY OF CK (CPK): CPT

## 2025-03-11 PROCEDURE — 25810000003 SODIUM CHLORIDE 0.9 % SOLUTION: Performed by: NURSE PRACTITIONER

## 2025-03-11 PROCEDURE — 25810000003 SODIUM CHLORIDE 0.9 % SOLUTION

## 2025-03-11 PROCEDURE — 85027 COMPLETE CBC AUTOMATED: CPT | Performed by: PHYSICIAN ASSISTANT

## 2025-03-11 PROCEDURE — 80048 BASIC METABOLIC PNL TOTAL CA: CPT | Performed by: PHYSICIAN ASSISTANT

## 2025-03-11 PROCEDURE — 25010000002 LEVETRIRACETAM PER 10 MG: Performed by: PSYCHIATRY & NEUROLOGY

## 2025-03-11 RX ORDER — BACLOFEN 10 MG/1
15 TABLET ORAL 3 TIMES DAILY
Status: DISCONTINUED | OUTPATIENT
Start: 2025-03-11 | End: 2025-03-14 | Stop reason: HOSPADM

## 2025-03-11 RX ORDER — ASCORBIC ACID 500 MG
250 TABLET ORAL DAILY
Status: DISCONTINUED | OUTPATIENT
Start: 2025-03-12 | End: 2025-03-14 | Stop reason: HOSPADM

## 2025-03-11 RX ADMIN — AMLODIPINE BESYLATE 5 MG: 5 TABLET ORAL at 21:24

## 2025-03-11 RX ADMIN — SODIUM CHLORIDE 1000 ML: 9 INJECTION, SOLUTION INTRAVENOUS at 09:53

## 2025-03-11 RX ADMIN — SODIUM CHLORIDE 150 ML/HR: 9 INJECTION, SOLUTION INTRAVENOUS at 08:23

## 2025-03-11 RX ADMIN — TIZANIDINE 4 MG: 4 TABLET ORAL at 21:24

## 2025-03-11 RX ADMIN — ONDANSETRON 4 MG: 2 INJECTION, SOLUTION INTRAMUSCULAR; INTRAVENOUS at 04:45

## 2025-03-11 RX ADMIN — Medication 10 ML: at 21:43

## 2025-03-11 RX ADMIN — CARVEDILOL 12.5 MG: 12.5 TABLET, FILM COATED ORAL at 08:24

## 2025-03-11 RX ADMIN — SODIUM CHLORIDE 150 ML/HR: 9 INJECTION, SOLUTION INTRAVENOUS at 12:33

## 2025-03-11 RX ADMIN — BACLOFEN 20 MG: 10 TABLET ORAL at 08:24

## 2025-03-11 RX ADMIN — TERAZOSIN HYDROCHLORIDE 5 MG: 5 CAPSULE ORAL at 08:25

## 2025-03-11 RX ADMIN — Medication 10 ML: at 08:26

## 2025-03-11 RX ADMIN — BACLOFEN 20 MG: 10 TABLET ORAL at 15:05

## 2025-03-11 RX ADMIN — LEVETIRACETAM 500 MG: 100 INJECTION INTRAVENOUS at 21:24

## 2025-03-11 RX ADMIN — GABAPENTIN 100 MG: 100 CAPSULE ORAL at 21:24

## 2025-03-11 RX ADMIN — TERAZOSIN HYDROCHLORIDE 5 MG: 5 CAPSULE ORAL at 21:24

## 2025-03-11 RX ADMIN — SODIUM CHLORIDE 150 ML/HR: 9 INJECTION, SOLUTION INTRAVENOUS at 22:14

## 2025-03-11 RX ADMIN — SODIUM CHLORIDE 1000 ML: 9 INJECTION, SOLUTION INTRAVENOUS at 10:45

## 2025-03-11 RX ADMIN — SODIUM CHLORIDE 150 ML/HR: 9 INJECTION, SOLUTION INTRAVENOUS at 01:51

## 2025-03-11 RX ADMIN — SACUBITRIL AND VALSARTAN 1 TABLET: 24; 26 TABLET, FILM COATED ORAL at 12:30

## 2025-03-11 RX ADMIN — TRAZODONE HYDROCHLORIDE 50 MG: 50 TABLET ORAL at 21:24

## 2025-03-11 RX ADMIN — LEVETIRACETAM 500 MG: 100 INJECTION INTRAVENOUS at 08:24

## 2025-03-11 RX ADMIN — TIZANIDINE 4 MG: 4 TABLET ORAL at 15:05

## 2025-03-11 RX ADMIN — DULOXETINE 40 MG: 20 CAPSULE, DELAYED RELEASE ORAL at 12:30

## 2025-03-11 RX ADMIN — TIZANIDINE 4 MG: 4 TABLET ORAL at 08:32

## 2025-03-11 RX ADMIN — BACLOFEN 15 MG: 10 TABLET ORAL at 21:24

## 2025-03-11 NOTE — PLAN OF CARE
Goal Outcome Evaluation:            Here for new onset seizure. Pt has been started on Keppra. Family concerned about increased drowsiness since starting medication. Pt c/o back pain and had 1 episode of nausea and vomiting overnight relieved with Zofran. He has been turned on request and has a purewick in place. Family at bedside. Pt alert and oriented with periods of increased sleepiness. Partial paralysis on left side with Valery boot to left foot. CK has decreased slightly with NS @150ml/hr. To see neurology in the morning.

## 2025-03-11 NOTE — PROGRESS NOTES
ED OBSERVATION PROGRESS/DISCHARGE SUMMARY    Date of Admission: 3/9/2025   LOS: 0 days   PCP: Nancy Mendez, MINESH      Hospital Outcome:     John Cooper Jr. is a 40 y.o. male presents status post witnessed tonic-clonic seizure lasting 7 to 10 minutes and required BVM support when EMS arrived.  He had 1 prior seizure-like episode during recent hospitalization for intracranial bleed and cranioplasty.  His EEG during this hospitalization was abnormal due to mild to moderate diffuse background slowing more focal right hemisphere and inner ear canal discharge.  CT head MRI brain without acute findings, there is right sided encephalomalacia consistent with recent infarct or hemorrhage as well as wallerian degeneration.  Lactic acid was initially 7.3, improved to 1.2 after IV hydration.  Initial CK was 806 and this morning went up to 8797 therefore patient received 1 L of NS and was started on NS at 150 mL/hr. surgery evaluated patient and recommends that he follows up with neurosurgery at [facility].  Neurology also reevaluated patient and plans for outpatient follow-up but and has since signed off.  We will repeat CK in a.m. and anticipate that he will be discharged home if it is trending downward.     3/11/2025  Patient had an episode of vomiting overnight.  CK still elevated overnight at 8652.  Otherwise patient sleeping overnight.     1600  Patient CK continues to be elevated despite receiving 2 L of fluids today with repeat CK this afternoon of 8885.  Discussed the case with Dr. Byrd with [facility] and he graciously accepted patient.    ROS:  General: no fevers, chills  Respiratory: no cough, dyspnea  Cardiovascular: no chest pain, palpitations  Abdomen: No abdominal pain, nausea, vomiting, or diarrhea  Neurologic: No focal weakness    Objective   Physical Exam:  I have reviewed the vital signs.  Temp:  [97.9 °F (36.6 °C)-98.8 °F (37.1 °C)] 97.9 °F (36.6 °C)  Heart Rate:  [67-84] 71  Resp:  [18] 18  BP: (109-136)/(70-92)  121/85  General Appearance:    Alert, cooperative, no distress  Head:    Normocephalic, atraumatic  Eyes:    Sclerae anicteric  Neck:   Supple, no mass  Lungs: Clear to auscultation bilaterally, respirations unlabored  Heart: Regular rate and rhythm, S1 and S2 normal, no murmur, rub or gallop  Abdomen:  Soft, nontender, bowel sounds active, nondistended  Extremities: No clubbing, cyanosis, or edema to lower extremities  Pulses:  2+ and symmetric in distal lower extremities  Skin: No rashes   Neurologic: Oriented x3, Normal strength to extremities    Results Review:    I have reviewed the labs, radiology results and diagnostic studies.    Results from last 7 days   Lab Units 03/11/25  0547   WBC 10*3/mm3 5.03   HEMOGLOBIN g/dL 12.2*   HEMATOCRIT % 37.1*   PLATELETS 10*3/mm3 191     Results from last 7 days   Lab Units 03/10/25  0530 03/09/25  0910   SODIUM mmol/L 140 138   POTASSIUM mmol/L 4.5 4.8   CHLORIDE mmol/L 108* 101   CO2 mmol/L 22.0 19.0*   BUN mg/dL 17 21*   CREATININE mg/dL 1.65* 1.78*   CALCIUM mg/dL 8.5* 9.2   BILIRUBIN mg/dL  --  0.2   ALK PHOS U/L  --  96   ALT (SGPT) U/L  --  17   AST (SGOT) U/L  --  21   GLUCOSE mg/dL 101* 156*     Imaging Results (Last 24 Hours)       ** No results found for the last 24 hours. **            I have reviewed the medications.     Discharge Medications        ASK your doctor about these medications        Instructions Start Date   acetaminophen 325 MG tablet  Commonly known as: TYLENOL   650 mg, Every 6 Hours PRN      amLODIPine 5 MG tablet  Commonly known as: NORVASC   5 mg, Oral, Nightly      carvedilol 12.5 MG tablet  Commonly known as: COREG   12.5 mg, Oral, 2 Times Daily With Meals      doxazosin 8 MG tablet  Commonly known as: CARDURA   8 mg, Oral, 2 Times Daily      DULoxetine HCl 40 MG capsule delayed-release particles   40 mg, Daily      Entresto 24-26 MG tablet  Generic drug: sacubitril-valsartan   1 tablet, Oral, 2 Times Daily      gabapentin 300 MG  capsule  Commonly known as: NEURONTIN   100 mg, Nightly      spironolactone 25 MG tablet  Commonly known as: ALDACTONE   25 mg, Oral, Daily      tiZANidine 4 MG tablet  Commonly known as: ZANAFLEX   4 mg, Oral, 3 Times Daily      traZODone 50 MG tablet  Commonly known as: DESYREL    mg, Oral, Nightly      vitamin C 250 MG tablet  Commonly known as: ASCORBIC ACID   250 mg, Oral, Daily, Take with iron              ---------------------------------------------------------------------------------------------  Assessment & Plan   Assessment/Problem List    New onset seizure      Plan:    New onset seizure  Ct head shows postoperative findings from remote craniotomy but no acute findings  MRI no evidence right-sided encephalomalacia and malarian changes.  Scattered chronic microhemorrhages as well as patchy meningeal thickening.  Neurology following, loaded Keppra and okay to discharge with follow-up once seizure-free for 24 hours  Consult to neurosurgery for right scalp fluid collection.  Seizure precautions  Neuro checks every 4 hours    Rhabdomyolysis  Elevated CK  -Initially 800, recheck 8797, minimally improved now to 8652, trend     Acute kidney injury, improved  Creatinine 1.78, received 1 L of IVF and improved down to 1.65 this morning     Hypertension  Vital signs every 4 hours  Hold spironolactone for JANET otherwise continue home medications     VTE Prophylaxis:  Mechanical VTE prophylaxis orders are present      This note will serve as a progress note    MAIK Whittaker 03/11/25 06:20 EDT    I have worn appropriate PPE during this patient encounter, sanitized my hands both with entering and exiting patient's room.      30 minutes has been spent by [facility] providers in the care of this patient while under observation status

## 2025-03-11 NOTE — CONSULTS
Internal Medicine Consult  INTERNAL MEDICINE   [facility]       Patient Identification:  Name: John Cooper Jr.  Age: 40 y.o.  Sex: male  :  1985  MRN: [id number]                   Primary Care Physician: Nancy Mendez APRN                               Requesting physician: Nahid Bar APRN  Date of consultation: 2025    Reason for consultation: Continued need for inpatient hospitalization for rhabdomyolysis management and persistently elevated CPK    History of Present Illness:   Patient is a 40-year-old male who has past medical history remarkable for hemorrhagic stroke requiring craniotomy and evacuation of hematoma and cranioplasty with residual left-sided hemiplegia, history of hypertension dyslipidemia nonischemic cardiomyopathy and history of heart failure with reduced ejection fraction and sleep apnea presented to the hospital on 3/9/2025 for further treatment of seizure of new onset.  Patient was admitted to observation unit and seen by neurology service and neurosurgery service.  Patient was noted to have elevated glucose of 156 normal white blood cell count creatinine of 1.78 and CPK level of 806 with lactate of 7.3.  Patient received IV fluids with close watch of volume overload given her history of cardiomyopathy and congestive heart failure but his CPK continues to decline with subsequent CPK level going in the range of 8797 and today 8786.  Because of that nephrology service was consulted and they recommended hydration and close monitoring of CPK level and inpatient admission.  Internal medicine service consulted for continued stay in the hospital as inpatient.  Patient at present denies any new complaints.  According to his mother at the bedside he is at his baseline self.      Past Medical History:  Past Medical History:   Diagnosis Date    Allergic     Stroke      Past Surgical History:  Past Surgical History:   Procedure Laterality Date    ANKLE SURGERY  Right 2015    MANDIBLE SURGERY Right 2019      Home Meds:  Medications Prior to Admission   Medication Sig Dispense Refill Last Dose/Taking    acetaminophen (TYLENOL) 325 MG tablet Take 2 tablets by mouth Every 6 (Six) Hours As Needed for Mild Pain.   Past Month    amLODIPine (NORVASC) 5 MG tablet Take 1 tablet by mouth Every Night. 90 tablet 3 3/8/2025    carvedilol (COREG) 12.5 MG tablet Take 1 tablet by mouth 2 (Two) Times a Day With Meals. 180 tablet 1 3/8/2025 at  8:00 PM    doxazosin (CARDURA) 8 MG tablet Take 1 tablet by mouth 2 (Two) Times a Day. 180 tablet 1 3/8/2025    DULoxetine HCl 40 MG capsule delayed-release particles Take 1 capsule by mouth Daily.   Taking    gabapentin (NEURONTIN) 300 MG capsule Take 100 mg by mouth Every Night.   3/8/2025    sacubitril-valsartan (Entresto) 24-26 MG tablet Take 1 tablet by mouth 2 (Two) Times a Day. 180 tablet 3 3/8/2025    spironolactone (ALDACTONE) 25 MG tablet Take 1 tablet by mouth Daily. 90 tablet 1 3/9/2025 at 11:30 AM    tiZANidine (ZANAFLEX) 4 MG tablet Take 1 tablet by mouth 3 (Three) Times a Day. 180 tablet 1 3/8/2025 at  8:30 PM    traZODone (DESYREL) 50 MG tablet Take 1-2 tablets by mouth Every Night. 180 tablet 1 3/8/2025    vitamin C (ASCORBIC ACID) 250 MG tablet Take 1 tablet by mouth Daily. Take with iron 90 tablet 0 3/8/2025 at  2:00 PM     Current Meds:     Current Facility-Administered Medications:     acetaminophen (TYLENOL) tablet 650 mg, 650 mg, Oral, Q6H PRN, Hitesh, Amanda, APRN, 650 mg at 03/09/25 1937    amLODIPine (NORVASC) tablet 5 mg, 5 mg, Oral, Nightly, Amanda Proctor, APRN, 5 mg at 03/10/25 2118    [START ON 3/12/2025] ascorbic acid (VITAMIN C) tablet 250 mg, 250 mg, Oral, Daily, Stefnay Lopez MD    baclofen (LIORESAL) tablet 20 mg, 20 mg, Oral, TID, Amanda Proctor, MINESH, 20 mg at 03/11/25 1505    sennosides-docusate (PERICOLACE) 8.6-50 MG per tablet 2 tablet, 2 tablet, Oral, BID PRN **AND** polyethylene glycol (MIRALAX) packet 17 g,  17 g, Oral, Daily PRN **AND** bisacodyl (DULCOLAX) EC tablet 5 mg, 5 mg, Oral, Daily PRN **AND** bisacodyl (DULCOLAX) suppository 10 mg, 10 mg, Rectal, Daily PRN, Hitesh, Amanda, APRN    carvedilol (COREG) tablet 12.5 mg, 12.5 mg, Oral, BID With Meals, Amanda Proctor, APRN, 12.5 mg at 03/11/25 0824    DULoxetine (CYMBALTA) DR capsule 40 mg, 40 mg, Oral, Daily, Qadah, Abdalhakim, APRN, 40 mg at 03/11/25 1230    gabapentin (NEURONTIN) capsule 100 mg, 100 mg, Oral, Nightly, Amanda Proctor, APRN, 100 mg at 03/10/25 2119    levETIRAcetam (KEPPRA) injection 500 mg, 500 mg, Intravenous, Q12H, Robi Vasquez MD, 500 mg at 03/11/25 0824    ondansetron ODT (ZOFRAN-ODT) disintegrating tablet 4 mg, 4 mg, Oral, Q6H PRN **OR** ondansetron (ZOFRAN) injection 4 mg, 4 mg, Intravenous, Q6H PRN, Amanda Proctor, APRN, 4 mg at 03/11/25 0445    Insert Peripheral IV, , , Once **AND** sodium chloride 0.9 % flush 10 mL, 10 mL, Intravenous, PRN, Hitesh, Amanda, APRN    sodium chloride 0.9 % flush 10 mL, 10 mL, Intravenous, Q12H, Hitesh, Amanda, APRN, 10 mL at 03/11/25 0826    sodium chloride 0.9 % flush 10 mL, 10 mL, Intravenous, PRN, Hitesh, Amanda, APRN    sodium chloride 0.9 % infusion 40 mL, 40 mL, Intravenous, PRN, Herth, Amanda, APRN    sodium chloride 0.9 % infusion, 150 mL/hr, Intravenous, Continuous, Greg Garrido APRN, Last Rate: 150 mL/hr at 03/11/25 1603, 150 mL/hr at 03/11/25 1603    [Held by provider] spironolactone (ALDACTONE) tablet 25 mg, 25 mg, Oral, Daily, Amanda Proctor APRN    terazosin (HYTRIN) capsule 5 mg, 5 mg, Oral, Q12H, Amanda Proctor, MINESH, 5 mg at 03/11/25 0825    tiZANidine (ZANAFLEX) tablet 4 mg, 4 mg, Oral, TID, Amanda Proctor APRN, 4 mg at 03/11/25 1505    traZODone (DESYREL) tablet 50 mg, 50 mg, Oral, Nightly, Amanda Proctor, APRN, 50 mg at 03/10/25 2119  Allergies:  Allergies   Allergen Reactions    Banana Unknown - High Severity     no problems with anesthesia    Latex Angioedema    Other Swelling     BANANAS,  STATES LIPS BEGAN TO SWELL AND TINGLE     Social History:   Social History     Tobacco Use    Smoking status: Never     Passive exposure: Never    Smokeless tobacco: Never    Tobacco comments:     Social cigar smoker   Substance Use Topics    Alcohol use: Yes     Comment: social      Family History:  Family History   Problem Relation Age of Onset    Hypertension Father     Bone cancer Brother     Colon cancer Maternal Grandmother           Review of Systems  See history of present illness and past medical history.        Vitals:   /76 (BP Location: Right arm, Patient Position: Lying)   Pulse 71   Temp 98.3 °F (36.8 °C) (Oral)   Resp 19   SpO2 95%   I/O:   Intake/Output Summary (Last 24 hours) at 3/11/2025 1705  Last data filed at 3/11/2025 0824  Gross per 24 hour   Intake 1657.2 ml   Output 2000 ml   Net -342.8 ml     Exam:  Patient is examined using the personal protective equipment as per guidelines from infection control for this particular patient as enacted.  Hand washing was performed before and after patient interaction.  General Appearance:    Alert, cooperative, no distress, appears stated age   Head:    Normocephalic, without obvious abnormality, atraumatic   Eyes:    PERRL, conjunctiva/corneas clear, EOM's intact, both eyes   Ears:    Normal external ear canals, both ears   Nose:   Nares normal, septum midline, mucosa normal, no drainage    or sinus tenderness   Throat:   Lips, tongue, gums normal; oral mucosa pink and moist   Neck:   Supple, symmetrical, trachea midline, no adenopathy;     thyroid:  no enlargement/tenderness/nodules; no carotid    bruit or JVD   Back:     Symmetric, no curvature, ROM normal, no CVA tenderness   Lungs:     Clear to auscultation bilaterally, respirations unlabored   Chest Wall:    No tenderness or deformity    Heart:  S1-S2 regular   Abdomen:   Soft nontender   Extremities:   Extremities normal, atraumatic, no cyanosis or edema   Pulses:   Pulses palpable in all  extremities; symmetric all extremities   Skin:   Skin color normal, Skin is warm and dry,  no rashes or palpable lesions   Neurologic: Residual left hemiparesis, awake and oriented x 3       Data Review:      I reviewed the patient's new clinical results.  Results from last 7 days   Lab Units 03/11/25  0547 03/10/25  0530 03/09/25  0910   WBC 10*3/mm3 5.03 5.09 5.03   HEMOGLOBIN g/dL 12.2* 11.7* 12.7*   PLATELETS 10*3/mm3 191 212 217     Results from last 7 days   Lab Units 03/11/25  0547 03/10/25  0530 03/09/25  0910   SODIUM mmol/L 142 140 138   POTASSIUM mmol/L 4.0 4.5 4.8   CHLORIDE mmol/L 108* 108* 101   CO2 mmol/L 21.9* 22.0 19.0*   BUN mg/dL 12 17 21*   CREATININE mg/dL 1.35* 1.65* 1.78*   CALCIUM mg/dL 8.3* 8.5* 9.2   GLUCOSE mg/dL 96 101* 156*     CPK          3/11/2025    00:00 3/11/2025    05:47 3/11/2025    12:18   Common Labs   Creatine Kinase 8,652  8,786  8,885        Assessment:  Active Hospital Problems    Diagnosis  POA    **New onset seizure [R56.9]  Yes    Rhabdomyolysis [M62.82]  Yes   Acute kidney injury  History of hemorrhagic CVA with residual left-sided hemiparesis  History of craniotomy and cranioplasty and evacuation of hematoma  History of cardiomyopathy and congestive heart failure due to reduced ejection fraction  Plan:  To continue his hospital stay as inpatient under [facility]  Continue with IV fluids close monitoring for volume overload in the setting of prior history of cardiomyopathy  Monitor serial CPK  Avoid myotoxic agent and statins  Continue with physical therapy  Follow neurology and nephrology advice going forward  Seizure precautions    Mel Lopez MD   3/11/2025  17:05 EDT    Parts of this note may be an electronic transcription/translation of spoken language to printed text using the Dragon dictation system.

## 2025-03-11 NOTE — CONSULTS
Referring Provider: Dr. Lechuga.  Reason for Consultation: JANET, rhabdomyolysis.    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  Subsequent left side hemiplegia with spasticity.  On multiple muscle relaxers at home.  Has recently been weaning off of gabapentin and has increased baclofen from 10 mg 3 times daily to 20 mg 3 times daily.  Admitted 3/9/2025 after seizure at home.  The only new medication was an extra dose of trazodone for sleep the night prior to the seizure.  His CK on admission was 806.  It subsequently lorin to 8700 and has really stayed in that range the past 3 days.  He is on IV fluid normal saline 150 cc/h with excellent urine output.  His initial creatinine was 1.78 but it has improved to 1.35 today.  Blood pressure initially very hypertensive but has improved.  He is on Entresto and spironolactone at home.  He was started on Keppra here in the hospital.  Neurology has been following him.  He has some left leg pain and left shoulder sensitivity that is chronic.  Eating fair  Urine clear yellow  Bowels loose at home but improved after stopping oral iron.    Past Medical History:   Diagnosis Date    Allergic     Stroke      Past Surgical History:   Procedure Laterality Date    ANKLE SURGERY Right 2015    MANDIBLE SURGERY Right 2019     Family History   Problem Relation Age of Onset    Hypertension Father     Bone cancer Brother     Colon cancer Maternal Grandmother      Lives with his parents.  Social History     Tobacco Use    Smoking status: Never     Passive exposure: Never    Smokeless tobacco: Never    Tobacco comments:     Social cigar smoker   Vaping Use    Vaping status: Never Used   Substance Use Topics    Alcohol use: Yes     Comment: social    Drug use: Not Currently     Medications Prior to  Admission   Medication Sig Dispense Refill Last Dose/Taking    acetaminophen (TYLENOL) 325 MG tablet Take 2 tablets by mouth Every 6 (Six) Hours As Needed for Mild Pain.   Past Month    amLODIPine (NORVASC) 5 MG tablet Take 1 tablet by mouth Every Night. 90 tablet 3 3/8/2025    carvedilol (COREG) 12.5 MG tablet Take 1 tablet by mouth 2 (Two) Times a Day With Meals. 180 tablet 1 3/8/2025 at  8:00 PM    doxazosin (CARDURA) 8 MG tablet Take 1 tablet by mouth 2 (Two) Times a Day. 180 tablet 1 3/8/2025    DULoxetine HCl 40 MG capsule delayed-release particles Take 1 capsule by mouth Daily.   Taking    gabapentin (NEURONTIN) 300 MG capsule Take 100 mg by mouth Every Night.   3/8/2025    sacubitril-valsartan (Entresto) 24-26 MG tablet Take 1 tablet by mouth 2 (Two) Times a Day. 180 tablet 3 3/8/2025    spironolactone (ALDACTONE) 25 MG tablet Take 1 tablet by mouth Daily. 90 tablet 1 3/9/2025 at 11:30 AM    tiZANidine (ZANAFLEX) 4 MG tablet Take 1 tablet by mouth 3 (Three) Times a Day. 180 tablet 1 3/8/2025 at  8:30 PM    traZODone (DESYREL) 50 MG tablet Take 1-2 tablets by mouth Every Night. 180 tablet 1 3/8/2025    vitamin C (ASCORBIC ACID) 250 MG tablet Take 1 tablet by mouth Daily. Take with iron 90 tablet 0 3/8/2025 at  2:00 PM     Allergies:  Banana, Latex, and Other    Review of Systems  14 points review of system was performed and it was negative other than what noted above in the HPI    Objective     Vital Signs  Temp:  [97.9 °F (36.6 °C)-98.8 °F (37.1 °C)] 98.6 °F (37 °C)  Heart Rate:  [71-84] 71  Resp:  [18] 18  BP: (114-126)/(70-88) 126/88         I/O this shift:  In: 240 [P.O.:240]  Out: 900 [Urine:900]  I/O last 3 completed shifts:  In: 1417.2 [I.V.:1417.2]  Out: 2300 [Urine:2300]    Intake/Output Summary (Last 24 hours) at 3/11/2025 1543  Last data filed at 3/11/2025 0824  Gross per 24 hour   Intake 1657.2 ml   Output 2000 ml   Net -342.8 ml       Physical Exam:  General Appearance: alert, oriented x 3, no  acute distress, speech a little dysarthric at times.  Skin: warm and dry  HEENT: pupils round and reactive to light, oral mucosa normal, nonicteric sclera  Neck: supple, no JVD, trachea midline  Lungs: Clear to auscultation bilaterally.  Unlabored lying flat.  Heart: RRR, normal S1 and S2, no S3, no rub  Abdomen: soft, nontender, normoactive bowels  : no palpable bladder, external catheter.  Extremities: Minimal peripheral edema.  No muscle tightness or evidence of compartment syndrome upper and lower extremities.  Neuro: Spastic hemiplegia on the left.    Results Review:  Results for orders placed or performed during the hospital encounter of 03/09/25   Comprehensive Metabolic Panel    Collection Time: 03/09/25  9:10 AM    Specimen: Blood   Result Value Ref Range    Glucose 156 (H) 65 - 99 mg/dL    BUN 21 (H) 6 - 20 mg/dL    Creatinine 1.78 (H) 0.76 - 1.27 mg/dL    Sodium 138 136 - 145 mmol/L    Potassium 4.8 3.5 - 5.2 mmol/L    Chloride 101 98 - 107 mmol/L    CO2 19.0 (L) 22.0 - 29.0 mmol/L    Calcium 9.2 8.6 - 10.5 mg/dL    Total Protein 7.7 6.0 - 8.5 g/dL    Albumin 4.3 3.5 - 5.2 g/dL    ALT (SGPT) 17 1 - 41 U/L    AST (SGOT) 21 1 - 40 U/L    Alkaline Phosphatase 96 39 - 117 U/L    Total Bilirubin 0.2 0.0 - 1.2 mg/dL    Globulin 3.4 gm/dL    A/G Ratio 1.3 g/dL    BUN/Creatinine Ratio 11.8 7.0 - 25.0    Anion Gap 18.0 (H) 5.0 - 15.0 mmol/L    eGFR 48.5 (L) >60.0 mL/min/1.73   Ethanol    Collection Time: 03/09/25  9:10 AM    Specimen: Blood   Result Value Ref Range    Ethanol <10 0 - 10 mg/dL    Ethanol % <0.010 %   CBC Auto Differential    Collection Time: 03/09/25  9:10 AM    Specimen: Blood   Result Value Ref Range    WBC 5.03 3.40 - 10.80 10*3/mm3    RBC 4.46 4.14 - 5.80 10*6/mm3    Hemoglobin 12.7 (L) 13.0 - 17.7 g/dL    Hematocrit 38.5 37.5 - 51.0 %    MCV 86.3 79.0 - 97.0 fL    MCH 28.5 26.6 - 33.0 pg    MCHC 33.0 31.5 - 35.7 g/dL    RDW 13.4 12.3 - 15.4 %    RDW-SD 42.1 37.0 - 54.0 fl    MPV 12.1 (H) 6.0  - 12.0 fL    Platelets 217 140 - 450 10*3/mm3    Neutrophil % 64.2 42.7 - 76.0 %    Lymphocyte % 28.0 19.6 - 45.3 %    Monocyte % 3.6 (L) 5.0 - 12.0 %    Eosinophil % 2.6 0.3 - 6.2 %    Basophil % 0.6 0.0 - 1.5 %    Immature Grans % 1.0 (H) 0.0 - 0.5 %    Neutrophils, Absolute 3.23 1.70 - 7.00 10*3/mm3    Lymphocytes, Absolute 1.41 0.70 - 3.10 10*3/mm3    Monocytes, Absolute 0.18 0.10 - 0.90 10*3/mm3    Eosinophils, Absolute 0.13 0.00 - 0.40 10*3/mm3    Basophils, Absolute 0.03 0.00 - 0.20 10*3/mm3    Immature Grans, Absolute 0.05 0.00 - 0.05 10*3/mm3    nRBC 0.0 0.0 - 0.2 /100 WBC   Lactic Acid, Plasma    Collection Time: 03/09/25  9:10 AM    Specimen: Blood   Result Value Ref Range    Lactate 7.3 (C) 0.5 - 2.0 mmol/L   CK    Collection Time: 03/09/25  9:10 AM    Specimen: Blood   Result Value Ref Range    Creatine Kinase 806 (H) 20 - 200 U/L   Urinalysis With Microscopic If Indicated (No Culture) - Urine, Clean Catch    Collection Time: 03/09/25 11:47 AM    Specimen: Urine, Clean Catch   Result Value Ref Range    Color, UA Yellow Yellow, Straw    Appearance, UA Clear Clear    pH, UA 5.5 5.0 - 8.0    Specific Gravity, UA 1.017 1.005 - 1.030    Glucose, UA Negative Negative    Ketones, UA Negative Negative    Bilirubin, UA Negative Negative    Blood, UA Small (1+) (A) Negative    Protein, UA 30 mg/dL (1+) (A) Negative    Leuk Esterase, UA Negative Negative    Nitrite, UA Negative Negative    Urobilinogen, UA 0.2 E.U./dL 0.2 - 1.0 E.U./dL   Urine Drug Screen - Urine, Clean Catch    Collection Time: 03/09/25 11:47 AM    Specimen: Urine, Clean Catch   Result Value Ref Range    THC, Screen, Urine Negative Negative    Phencyclidine (PCP), Urine Negative Negative    Cocaine Screen, Urine Negative Negative    Methamphetamine, Ur Negative Negative    Opiate Screen Negative Negative    Amphetamine Screen, Urine Negative Negative    Benzodiazepine Screen, Urine Negative Negative    Tricyclic Antidepressants Screen Negative  Negative    Methadone Screen, Urine Negative Negative    Barbiturates Screen, Urine Negative Negative    Oxycodone Screen, Urine Negative Negative    Buprenorphine, Screen, Urine Negative Negative   Fentanyl, Urine - Urine, Clean Catch    Collection Time: 03/09/25 11:47 AM    Specimen: Urine, Clean Catch   Result Value Ref Range    Fentanyl, Urine Negative Negative   Urinalysis, Microscopic Only - Urine, Clean Catch    Collection Time: 03/09/25 11:47 AM    Specimen: Urine, Clean Catch   Result Value Ref Range    RBC, UA 0-2 None Seen, 0-2 /HPF    WBC, UA 0-2 None Seen, 0-2 /HPF    Bacteria, UA None Seen None Seen /HPF    Squamous Epithelial Cells, UA 0-2 None Seen, 0-2 /HPF    Hyaline Casts, UA 0-2 None Seen /LPF    Methodology Automated Microscopy    STAT Lactic Acid, Reflex    Collection Time: 03/09/25  2:22 PM    Specimen: Arm, Right; Blood   Result Value Ref Range    Lactate 1.2 0.5 - 2.0 mmol/L   CBC Auto Differential    Collection Time: 03/10/25  5:30 AM    Specimen: Blood   Result Value Ref Range    WBC 5.09 3.40 - 10.80 10*3/mm3    RBC 4.13 (L) 4.14 - 5.80 10*6/mm3    Hemoglobin 11.7 (L) 13.0 - 17.7 g/dL    Hematocrit 35.4 (L) 37.5 - 51.0 %    MCV 85.7 79.0 - 97.0 fL    MCH 28.3 26.6 - 33.0 pg    MCHC 33.1 31.5 - 35.7 g/dL    RDW 13.5 12.3 - 15.4 %    RDW-SD 42.2 37.0 - 54.0 fl    MPV 12.2 (H) 6.0 - 12.0 fL    Platelets 212 140 - 450 10*3/mm3    Neutrophil % 56.3 42.7 - 76.0 %    Lymphocyte % 35.4 19.6 - 45.3 %    Monocyte % 5.7 5.0 - 12.0 %    Eosinophil % 2.0 0.3 - 6.2 %    Basophil % 0.4 0.0 - 1.5 %    Immature Grans % 0.2 0.0 - 0.5 %    Neutrophils, Absolute 2.87 1.70 - 7.00 10*3/mm3    Lymphocytes, Absolute 1.80 0.70 - 3.10 10*3/mm3    Monocytes, Absolute 0.29 0.10 - 0.90 10*3/mm3    Eosinophils, Absolute 0.10 0.00 - 0.40 10*3/mm3    Basophils, Absolute 0.02 0.00 - 0.20 10*3/mm3    Immature Grans, Absolute 0.01 0.00 - 0.05 10*3/mm3    nRBC 0.0 0.0 - 0.2 /100 WBC   Basic Metabolic Panel    Collection  Time: 03/10/25  5:30 AM    Specimen: Blood   Result Value Ref Range    Glucose 101 (H) 65 - 99 mg/dL    BUN 17 6 - 20 mg/dL    Creatinine 1.65 (H) 0.76 - 1.27 mg/dL    Sodium 140 136 - 145 mmol/L    Potassium 4.5 3.5 - 5.2 mmol/L    Chloride 108 (H) 98 - 107 mmol/L    CO2 22.0 22.0 - 29.0 mmol/L    Calcium 8.5 (L) 8.6 - 10.5 mg/dL    BUN/Creatinine Ratio 10.3 7.0 - 25.0    Anion Gap 10.0 5.0 - 15.0 mmol/L    eGFR 53.2 (L) >60.0 mL/min/1.73   CK    Collection Time: 03/10/25  5:30 AM    Specimen: Blood   Result Value Ref Range    Creatine Kinase 8,797 (H) 20 - 200 U/L   CK    Collection Time: 03/10/25 12:02 PM    Specimen: Blood   Result Value Ref Range    Creatine Kinase 8,167 (H) 20 - 200 U/L   CK    Collection Time: 03/10/25  6:16 PM    Specimen: Blood   Result Value Ref Range    Creatine Kinase 8,977 (H) 20 - 200 U/L   CK    Collection Time: 03/11/25 12:00 AM    Specimen: Blood   Result Value Ref Range    Creatine Kinase 8,652 (H) 20 - 200 U/L   CK    Collection Time: 03/11/25  5:47 AM    Specimen: Blood   Result Value Ref Range    Creatine Kinase 8,786 (H) 20 - 200 U/L   CBC (No Diff)    Collection Time: 03/11/25  5:47 AM    Specimen: Blood   Result Value Ref Range    WBC 5.03 3.40 - 10.80 10*3/mm3    RBC 4.32 4.14 - 5.80 10*6/mm3    Hemoglobin 12.2 (L) 13.0 - 17.7 g/dL    Hematocrit 37.1 (L) 37.5 - 51.0 %    MCV 85.9 79.0 - 97.0 fL    MCH 28.2 26.6 - 33.0 pg    MCHC 32.9 31.5 - 35.7 g/dL    RDW 13.7 12.3 - 15.4 %    RDW-SD 42.9 37.0 - 54.0 fl    MPV 12.1 (H) 6.0 - 12.0 fL    Platelets 191 140 - 450 10*3/mm3   Basic Metabolic Panel    Collection Time: 03/11/25  5:47 AM    Specimen: Blood   Result Value Ref Range    Glucose 96 65 - 99 mg/dL    BUN 12 6 - 20 mg/dL    Creatinine 1.35 (H) 0.76 - 1.27 mg/dL    Sodium 142 136 - 145 mmol/L    Potassium 4.0 3.5 - 5.2 mmol/L    Chloride 108 (H) 98 - 107 mmol/L    CO2 21.9 (L) 22.0 - 29.0 mmol/L    Calcium 8.3 (L) 8.6 - 10.5 mg/dL    BUN/Creatinine Ratio 8.9 7.0 - 25.0     Anion Gap 12.1 5.0 - 15.0 mmol/L    eGFR 67.6 >60.0 mL/min/1.73   CK    Collection Time: 03/11/25 12:18 PM    Specimen: Blood   Result Value Ref Range    Creatine Kinase 8,885 (H) 20 - 200 U/L     Imaging Results (Last 72 Hours)       Procedure Component Value Units Date/Time    MRI Brain With & Without Contrast [[id number]] Collected: 03/10/25 0230     Updated: 03/10/25 0230    Narrative:        Patient: BUD, SILVIA  Time Out: 02:29  Exam(s): MRI HEAD W WO Contrast     EXAM:    MR Head Without and With Intravenous Contrast    CLINICAL HISTORY:     Reason for exam: new onset seizure.    TECHNIQUE:    Magnetic resonance images of the head brain without and with   intravenous contrast in multiple planes.    COMPARISON:  3 9 2025    FINDINGS:    Brain:  No mass.  No acute hemorrhage.  No restricted diffusion.  Right-  sided encephalomalacia.  Right sided wallerian degeneration.  Scattered   chronic microhemorrhages.  Right-sided pachymeningeal thickening, likely   reactive from prior surgery.    Ventricles:  No hydrocephalus.    Bones joints: Postop changes.    Sinuses:  No acute sinusitis.    Mastoid air cells:  No effusion.    Orbits:  Unremarkable.    IMPRESSION:         No acute infarct, hemorrhage, or hydrocephalus.      Impression:          Electronically signed by Sarah Ramon MD on 03-10-25 at 0229    CT Outside Films [[id number]] Resulted: 03/09/25 1145     Updated: 03/09/25 1145    Narrative:      This procedure was auto-finalized with no dictation required.    CT Outside Films [[id number]] Resulted: 03/09/25 1145     Updated: 03/09/25 1145    Narrative:      This procedure was auto-finalized with no dictation required.    CT Outside Films [[id number]] Resulted: 03/09/25 1145     Updated: 03/09/25 1145    Narrative:      This procedure was auto-finalized with no dictation required.    CT Head Without Contrast [[id number]] Collected: 03/09/25 1110     Updated: 03/09/25 1124    Narrative:      CT HEAD WO  CONTRAST-     DATE OF EXAM: 3/9/2025 10:34 AM     INDICATION: Seizure. Head trauma.     COMPARISON: None available. Correlation is made with the reports from  the prior head CTs performed at an outside facility on 2/21/2024 and  1/23/2024 with the imaging not currently available for direct  comparison.     TECHNIQUE: Multiple contiguous axial images were acquired through the  head without the intravenous administration of contrast. Reformatted  coronal and sagittal sequences were also reviewed. Radiation dose  reduction techniques were utilized, including automated exposure control  and exposure modulation based on body size.     FINDINGS:  No acute intracranial hemorrhage or mass/mass effect. Postoperative  changes from right calvarial craniotomy and pterional craniectomy with a  4 cm x 1.5 cm subcutaneous/subgaleal CSF attenuation collection  overlying the pterional craniectomy consistent with a pseudomeningocele.  Large region of encephalomalacia in the right frontal and temporal lobes  and the right basal ganglia, likely from old infarct or hemorrhage.  Associated ex vacuo dilatation of the right lateral ventricle. No  hydrocephalus. Minimal 6 mm left to right midline shift. The basilar  cisterns are patent. Gray-white matter differentiation is otherwise  grossly maintained. Limited imaging of the orbits, paranasal sinuses,  and mastoid air cells is unremarkable. No acute osseous abnormality is  identified.       Impression:         1. No acute intracranial hemorrhage or mass/mass effect.  2. Right frontal temporal and right basal ganglia encephalomalacia,  likely from old infarct or hemorrhage, with associated ex vacuo  dilatation of the right lateral ventricle and mild left to right midline  shift.  3. Postoperative changes from right calvarial craniotomy and right  pterional craniectomy with a 4 cm x 1.5 cm subcutaneous/subgaleal CSF  attenuation collection overlying the pterional craniectomy,  consistent  with a pseudomeningocele. Recommend comparison to prior imaging if  available     This report was finalized on 3/9/2025 11:21 AM by Mino Estrada MD on  Workstation: [id number]       XR Hip With or Without Pelvis 2 - 3 View Left [[id number]] Collected: 03/09/25 1025     Updated: 03/09/25 1030    Narrative:      XR HIP W OR WO PELVIS 2-3 VIEW LEFT-     INDICATIONS: Pain     TECHNIQUE: 5 VIEWS INCLUDING THE PELVIS AND LEFT HIP     COMPARISON: None available     FINDINGS:     No acute fracture, erosion, or dislocation is identified. Left hip joint  space appears preserved. Mild right hip joint space narrowing is  apparent. Nonspecific elongated sclerotic focus is apparent in the right  intertrochanteric region. Follow-up/further evaluation can be obtained  as indications persist.       Impression:         As described.           This report was finalized on 3/9/2025 10:27 AM by Dr. Maco Wright M.D on Workstation: [id number]                 amLODIPine, 5 mg, Oral, Nightly  vitamin C, 250 mg, Oral, Daily  baclofen, 20 mg, Oral, TID  carvedilol, 12.5 mg, Oral, BID With Meals  DULoxetine, 40 mg, Oral, Daily  gabapentin, 100 mg, Oral, Nightly  levETIRAcetam, 500 mg, Intravenous, Q12H  sodium chloride, 10 mL, Intravenous, Q12H  [Held by provider] spironolactone, 25 mg, Oral, Daily  terazosin, 5 mg, Oral, Q12H  tiZANidine, 4 mg, Oral, TID  traZODone, 50 mg, Oral, Nightly      sodium chloride, 150 mL/hr, Last Rate: 150 mL/hr (03/11/25 1233)        Assessment & Plan   JANET , likely multifactorial including rhabdomyolysis with elevated CPK after new onset seizure in the setting of sacubitril valsartan.  Urine with small blood, no red cells.  Nonoliguric.  On IV fluids at rate of 150/h.  His creatinine has improved to 1.35 from a peak of 1.78 days ago.  No evidence of compartment syndrome by exam.  Baclofen can also add to risk of seizure and has been reported to cause rhabdomyolysis.  May need to start  reducing the dose.  2.  New onset seizure.  Neurology evaluation.  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  Now on Keppra.  3.  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  Left side spastic hemiplegia.  4.  Prior history of heart failure reduced ejection fraction, EF on most recent echo 1/28/2025 normal.  5.  Hypertension.  Hold sacubitril/valsartan for now until renal function returns to normal and CK is normalized.  Continue Terazosin twice daily, home Coreg and Norvasc.  Watch blood pressure with aggressive IV fluid rate.  No evidence of volume excess for now.  6. Obstructive sleep apnea    Plan:  Continue IV fluids 150 cc/h.  No IV fluid bolus needed.  2.  Stop Entresto for now.  3.  Will discuss reduction of dose of baclofen with Neurology.   4.  Continue current antihypertensives.  5.  Can check daily CK.  6.  Would admit to the hospital.  I discussed the patient's findings and my recommendations with the family.      Stefany Lopez MD  03/11/25  15:43 EDT    Please note that portions of this note were completed with a voice recognition program.

## 2025-03-12 PROBLEM — N17.9 ACUTE KIDNEY INJURY: Status: ACTIVE | Noted: 2025-03-12

## 2025-03-12 PROBLEM — R56.9 SEIZURE, LATE EFFECT OF STROKE: Status: ACTIVE | Noted: 2025-03-12

## 2025-03-12 PROBLEM — I69.398 SEIZURE, LATE EFFECT OF STROKE: Status: ACTIVE | Noted: 2025-03-12

## 2025-03-12 LAB
ALBUMIN SERPL-MCNC: 3.7 G/DL (ref 3.5–5.2)
ALBUMIN/GLOB SERPL: 1.2 G/DL
ALP SERPL-CCNC: 77 U/L (ref 39–117)
ALT SERPL W P-5'-P-CCNC: 16 U/L (ref 1–41)
ANION GAP SERPL CALCULATED.3IONS-SCNC: 13.2 MMOL/L (ref 5–15)
AST SERPL-CCNC: 33 U/L (ref 1–40)
BILIRUB SERPL-MCNC: 0.2 MG/DL (ref 0–1.2)
BUN SERPL-MCNC: 10 MG/DL (ref 6–20)
BUN/CREAT SERPL: 8.1 (ref 7–25)
CALCIUM SPEC-SCNC: 8.4 MG/DL (ref 8.6–10.5)
CHLORIDE SERPL-SCNC: 106 MMOL/L (ref 98–107)
CK SERPL-CCNC: 6575 U/L (ref 20–200)
CO2 SERPL-SCNC: 21.8 MMOL/L (ref 22–29)
CREAT SERPL-MCNC: 1.23 MG/DL (ref 0.76–1.27)
EGFRCR SERPLBLD CKD-EPI 2021: 75.6 ML/MIN/1.73
GLOBULIN UR ELPH-MCNC: 3 GM/DL
GLUCOSE SERPL-MCNC: 92 MG/DL (ref 65–99)
MAGNESIUM SERPL-MCNC: 1.8 MG/DL (ref 1.6–2.6)
PHOSPHATE SERPL-MCNC: 3.2 MG/DL (ref 2.5–4.5)
POTASSIUM SERPL-SCNC: 4.1 MMOL/L (ref 3.5–5.2)
PROT SERPL-MCNC: 6.7 G/DL (ref 6–8.5)
SODIUM SERPL-SCNC: 141 MMOL/L (ref 136–145)

## 2025-03-12 PROCEDURE — 25810000003 SODIUM CHLORIDE 0.9 % SOLUTION: Performed by: INTERNAL MEDICINE

## 2025-03-12 PROCEDURE — 82550 ASSAY OF CK (CPK): CPT | Performed by: INTERNAL MEDICINE

## 2025-03-12 PROCEDURE — 84100 ASSAY OF PHOSPHORUS: CPT | Performed by: INTERNAL MEDICINE

## 2025-03-12 PROCEDURE — 83735 ASSAY OF MAGNESIUM: CPT | Performed by: INTERNAL MEDICINE

## 2025-03-12 PROCEDURE — 25010000002 LEVETRIRACETAM PER 10 MG: Performed by: PSYCHIATRY & NEUROLOGY

## 2025-03-12 PROCEDURE — 80053 COMPREHEN METABOLIC PANEL: CPT | Performed by: INTERNAL MEDICINE

## 2025-03-12 RX ORDER — SODIUM CHLORIDE 9 MG/ML
50 INJECTION, SOLUTION INTRAVENOUS CONTINUOUS
Status: ACTIVE | OUTPATIENT
Start: 2025-03-12 | End: 2025-03-14

## 2025-03-12 RX ORDER — LEVETIRACETAM 500 MG/1
500 TABLET ORAL 2 TIMES DAILY
Status: DISCONTINUED | OUTPATIENT
Start: 2025-03-12 | End: 2025-03-14 | Stop reason: HOSPADM

## 2025-03-12 RX ADMIN — LEVETIRACETAM 500 MG: 500 TABLET, FILM COATED ORAL at 21:15

## 2025-03-12 RX ADMIN — TIZANIDINE 3 MG: 4 TABLET ORAL at 21:13

## 2025-03-12 RX ADMIN — TIZANIDINE 3 MG: 4 TABLET ORAL at 08:59

## 2025-03-12 RX ADMIN — TIZANIDINE 3 MG: 4 TABLET ORAL at 14:04

## 2025-03-12 RX ADMIN — GABAPENTIN 100 MG: 100 CAPSULE ORAL at 21:15

## 2025-03-12 RX ADMIN — OXYCODONE HYDROCHLORIDE AND ACETAMINOPHEN 250 MG: 500 TABLET ORAL at 14:04

## 2025-03-12 RX ADMIN — LEVETIRACETAM 500 MG: 100 INJECTION INTRAVENOUS at 09:07

## 2025-03-12 RX ADMIN — AMLODIPINE BESYLATE 7.5 MG: 2.5 TABLET ORAL at 21:14

## 2025-03-12 RX ADMIN — CARVEDILOL 12.5 MG: 12.5 TABLET, FILM COATED ORAL at 21:15

## 2025-03-12 RX ADMIN — TRAZODONE HYDROCHLORIDE 50 MG: 50 TABLET ORAL at 21:15

## 2025-03-12 RX ADMIN — Medication 10 ML: at 08:59

## 2025-03-12 RX ADMIN — TERAZOSIN HYDROCHLORIDE 5 MG: 5 CAPSULE ORAL at 08:59

## 2025-03-12 RX ADMIN — BACLOFEN 15 MG: 10 TABLET ORAL at 08:59

## 2025-03-12 RX ADMIN — SODIUM CHLORIDE 150 ML/HR: 9 INJECTION, SOLUTION INTRAVENOUS at 06:31

## 2025-03-12 RX ADMIN — TERAZOSIN HYDROCHLORIDE 5 MG: 5 CAPSULE ORAL at 21:14

## 2025-03-12 RX ADMIN — DULOXETINE 40 MG: 20 CAPSULE, DELAYED RELEASE ORAL at 08:59

## 2025-03-12 RX ADMIN — BACLOFEN 15 MG: 10 TABLET ORAL at 21:14

## 2025-03-12 RX ADMIN — BACLOFEN 15 MG: 10 TABLET ORAL at 14:03

## 2025-03-12 RX ADMIN — CARVEDILOL 12.5 MG: 12.5 TABLET, FILM COATED ORAL at 08:59

## 2025-03-12 RX ADMIN — SODIUM CHLORIDE 150 ML/HR: 9 INJECTION, SOLUTION INTRAVENOUS at 14:07

## 2025-03-12 NOTE — PROGRESS NOTES
Name: John Cooper Jr. ADMIT: 3/9/2025   : 1985  PCP: Nancy Mendez APRN    MRN: [id number] LOS: 1 days   AGE/SEX: 40 y.o. male  ROOM: [facility]     Subjective   Subjective   A little groggy.  Has some soreness in his tongue and in his chest otherwise doing okay.       Objective   Objective   Vital Signs  Temp:  [97.6 °F (36.4 °C)-98.6 °F (37 °C)] 98.6 °F (37 °C)  Heart Rate:  [66-90] 81  Resp:  [18-19] 18  BP: (115-168)/(76-99) 115/83  SpO2:  [92 %-95 %] 94 %  on   ;   Device (Oxygen Therapy): room air  Body mass index is 30.11 kg/m².  Physical Exam  Vitals and nursing note reviewed.   Constitutional:       General: He is not in acute distress.     Appearance: He is ill-appearing (Chronically).   Cardiovascular:      Rate and Rhythm: Normal rate and regular rhythm.   Pulmonary:      Effort: Pulmonary effort is normal.      Breath sounds: Normal breath sounds.   Abdominal:      General: Bowel sounds are normal.      Palpations: Abdomen is soft.      Tenderness: There is no abdominal tenderness.   Musculoskeletal:         General: No swelling.   Skin:     General: Skin is warm and dry.   Neurological:      Mental Status: He is alert.      Comments: Left-sided weakness       Results Review     I reviewed the patient's new clinical results.  Results from last 7 days   Lab Units 25  0547 03/10/25  0530 25  0910   WBC 10*3/mm3 5.03 5.09 5.03   HEMOGLOBIN g/dL 12.2* 11.7* 12.7*   PLATELETS 10*3/mm3 191 212 217     Results from last 7 days   Lab Units 25  0640 25  0547 03/10/25  0530 25  0910   SODIUM mmol/L 141 142 140 138   POTASSIUM mmol/L 4.1 4.0 4.5 4.8   CHLORIDE mmol/L 106 108* 108* 101   CO2 mmol/L 21.8* 21.9* 22.0 19.0*   BUN mg/dL 10 12 17 21*   CREATININE mg/dL 1.23 1.35* 1.65* 1.78*   GLUCOSE mg/dL 92 96 101* 156*   EGFR mL/min/1.73 75.6 67.6 53.2* 48.5*     Results from last 7 days   Lab Units 25  0640 25  0910   ALBUMIN g/dL 3.7 4.3   BILIRUBIN mg/dL 0.2  0.2   ALK PHOS U/L 77 96   AST (SGOT) U/L 33 21   ALT (SGPT) U/L 16 17     Results from last 7 days   Lab Units 03/12/25  0640 03/11/25  0547 03/10/25  0530 03/09/25  0910   CALCIUM mg/dL 8.4* 8.3* 8.5* 9.2   ALBUMIN g/dL 3.7  --   --  4.3   MAGNESIUM mg/dL 1.8  --   --   --    PHOSPHORUS mg/dL 3.2  --   --   --      Results from last 7 days   Lab Units 03/09/25  1422 03/09/25  0910   LACTATE mmol/L 1.2 7.3*     Results from last 7 days   Lab Units 03/12/25  0640 03/11/25  1218 03/11/25  0547 03/11/25  0000 03/10/25  1816   CK TOTAL U/L 6,575* 8,885* 8,786* 8,652* 8,977*       I have personally reviewed all medications:  Scheduled Medications  amLODIPine, 7.5 mg, Oral, Nightly  vitamin C, 250 mg, Oral, Daily  baclofen, 15 mg, Oral, TID  carvedilol, 12.5 mg, Oral, BID With Meals  DULoxetine, 40 mg, Oral, Daily  gabapentin, 100 mg, Oral, Nightly  levETIRAcetam, 500 mg, Intravenous, Q12H  sodium chloride, 10 mL, Intravenous, Q12H  [Held by provider] spironolactone, 25 mg, Oral, Daily  terazosin, 5 mg, Oral, Q12H  tiZANidine, 3 mg, Oral, TID  traZODone, 50 mg, Oral, Nightly    Infusions  sodium chloride, 125 mL/hr, Last Rate: 150 mL/hr (03/12/25 1407)    Diet  Diet: Regular/House; Fluid Consistency: Thin (IDDSI 0)    I have personally reviewed:  [x]  Laboratory   [x]  Microbiology   [x]  Radiology   [x]  EKG/Telemetry  [x]  Cardiology/Vascular   []  Pathology    []  Records       Assessment/Plan     Active Hospital Problems    Diagnosis  POA    **Seizure, late effect of stroke [I69.398, R56.9]  Not Applicable    Acute kidney injury [N17.9]  Yes    Rhabdomyolysis [M62.82]  Yes    New onset seizure [R56.9]  Yes    FAUSTINO (obstructive sleep apnea) [G47.33]  Yes    Primary hypertension [I10]  Yes      Resolved Hospital Problems   No resolved problems to display.       40 y.o. male admitted with Seizure, late effect of stroke and JANET secondary to rhabdomyolysis.    Discussed with neurology.  No further workup indicated at this  point.  Will change IV Keppra over to oral.  Continue seizure precautions.  CPK is starting to trend down.  Renal function improving.  Appreciate nephrology assistance remains on IV fluids at 150 cc/h.  Continue daily monitoring labs.  PT/OT.       SCDs for DVT prophylaxis.  Full code.  Discussed with patient, family, and consulting provider.  Anticipate discharge home with family in 2-3 days.  Expected Discharge Date: 3/14/2025; Expected Discharge Time:       Rei Lee MD  [facility]  03/12/25  15:48 EDT

## 2025-03-12 NOTE — NURSING NOTE
RN called report to Robert on 5S. RN notified pt and family of transfer status. RN assisted patient to edge of bed and set up for feeding.

## 2025-03-12 NOTE — PROGRESS NOTES
[facility] Progress Note      Patient Name: John Cooper Jr.  : 1985  MRN: [id number]  Primary Care Physician:  Nancy Mendez APRN  Date of admission: 3/9/2025    Subjective     Interval History:   Follow up elevated CK and JANET.  UOP excellent. BP up some, but did not take coreg last night. Family at bedside. A little hungry. One bm since admission.  No real change in spasticity with increase in baclofen last month to 20 mg tid.  More sleepy with this dose.     Review of Systems:   As noted above    Objective     Vitals:   Temp:  [97.6 °F (36.4 °C)-98.6 °F (37 °C)] 97.6 °F (36.4 °C)  Heart Rate:  [66-79] 78  Resp:  [18-19] 18  BP: (123-142)/(76-98) 127/98    Intake/Output Summary (Last 24 hours) at 3/12/2025 0712  Last data filed at 3/12/2025 0448  Gross per 24 hour   Intake 240 ml   Output 1800 ml   Net -1560 ml       Physical Exam:    General Appearance: alert, no acute distress   Skin: warm and dry  HEENT: oral mucosa normal, nonicteric sclera  Neck: supple, no JVD  Lungs: clear to auscultation.   Heart: RRR, normal S1 and S2  Abdomen: soft, nontender, nondistended. +bs  : no palpable bladder  Extremities: no edema. Muscles soft.   Neuro: normal speech and mental status     Scheduled Meds:     amLODIPine, 7.5 mg, Oral, Nightly  vitamin C, 250 mg, Oral, Daily  baclofen, 15 mg, Oral, TID  carvedilol, 12.5 mg, Oral, BID With Meals  DULoxetine, 40 mg, Oral, Daily  gabapentin, 100 mg, Oral, Nightly  levETIRAcetam, 500 mg, Intravenous, Q12H  sodium chloride, 10 mL, Intravenous, Q12H  [Held by provider] spironolactone, 25 mg, Oral, Daily  terazosin, 5 mg, Oral, Q12H  tiZANidine, 3 mg, Oral, TID  traZODone, 50 mg, Oral, Nightly      IV Meds:   sodium chloride, 150 mL/hr, Last Rate: 150 mL/hr (25 2214)  sodium chloride, 150 mL/hr, Last Rate: 150 mL/hr (25 0631)        Results Reviewed:   I have personally reviewed the results from the time of this admission to  3/12/2025 07:12 EDT     Results from last 7 days   Lab Units 03/11/25  0547 03/10/25  0530 03/09/25  0910   SODIUM mmol/L 142 140 138   POTASSIUM mmol/L 4.0 4.5 4.8   CHLORIDE mmol/L 108* 108* 101   CO2 mmol/L 21.9* 22.0 19.0*   BUN mg/dL 12 17 21*   CREATININE mg/dL 1.35* 1.65* 1.78*   CALCIUM mg/dL 8.3* 8.5* 9.2   BILIRUBIN mg/dL  --   --  0.2   ALK PHOS U/L  --   --  96   ALT (SGPT) U/L  --   --  17   AST (SGOT) U/L  --   --  21   GLUCOSE mg/dL 96 101* 156*       Estimated Creatinine Clearance: 78.4 mL/min (A) (by C-G formula based on SCr of 1.35 mg/dL (H)).                Results from last 7 days   Lab Units 03/11/25  0547 03/10/25  0530 03/09/25  0910   WBC 10*3/mm3 5.03 5.09 5.03   HEMOGLOBIN g/dL 12.2* 11.7* 12.7*   PLATELETS 10*3/mm3 191 212 217             Assessment / Plan     ASSESSMENT:  JANET , likely multifactorial including rhabdomyolysis with elevated CPK after new onset seizure in the setting of sacubitril valsartan.  Urine with small blood, no red cells.  Nonoliguric.  On IV fluids at rate of 150/h.  His creatinine  improved yesterday to 1.35 from a peak of 1.78 days ago.  No evidence of compartment syndrome by exam.   2.  New onset seizure.  Neurology evaluation.  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  Now on Keppra.  Baclofen recently increased to 20 mg tid with reduction and weaning off Gabapentin. More sleepy lately.  I have reduced baclofen to 15 mg tid and tizanidine to 3 mg tid.  Slowly reducing.  WIll DW neurology.   He is followed by [facility] [place] [facility] .  3.  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  Left side spastic hemiplegia.  4.  Prior history of heart failure reduced ejection fraction, EF on most recent echo 1/28/2025 normal.  5.  Hypertension.  Hold sacubitril/valsartan for now until renal function returns to normal and CK is normalized.  Continue  Terazosin twice daily, home Coreg and Norvasc.  Watch blood pressure with aggressive IV fluid rate.  No evidence of volume excess for now.  6. Obstructive sleep apnea    PLAN:  [facility] neurology regarding reducing Baclofen to 10 mg tid.  Check pending chemistries  Do not think he needs cardiac monitoring. Will YAQUELIN Lee.   Increase norvasc to 7.5 mg hs if bp remains up today. ( Did not take coreg last night).  Continue IVF while CPK so high.  PT.     Thank you for involving us in the care of John Cooper Jr..  Please feel free to call with any questions.    Stefany Lopez MD  03/12/25  07:12 EDT    [facility]  [phone]    Please note that portions of this note were completed with a voice recognition program.

## 2025-03-12 NOTE — PAYOR COMM NOTE
"John Cooper Jr. (40 y.o. Male)     PLEASE SEE ATTACHED FOR INPT AUTH     PT WAS OBS ON 3/9  CHANGED TO INPT ON 3/11    REF #  PT ID [id number]     PLEASE CALL MERLY MORSE RN/ DEPT @ [phone]  OR FAX  DEPARTMENT @  [phone]    THANK YOU   MERLY MORSE RN  [facility]          Date of Birth   1985    Social Security Number       Address   [address] [id number]    Home Phone   [phone]    MRN   [id number]       Muslim   Advent    Marital Status   Single                            Admission Date   3/9/2025    Admission Type   Emergency    Admitting Provider   Mel Lopez MD    Attending Provider   Rei Lee MD    Department, Room/Bed   [address], N531/1       Discharge Date       Discharge Disposition       Discharge Destination                                 Attending Provider: Rei Lee MD    Allergies: Banana, Latex, Other    Isolation: None   Infection: None   Code Status: CPR    Ht: 172.7 cm (68\")   Wt: 89.8 kg (198 lb)    Admission Cmt: None   Principal Problem: Seizure, late effect of stroke [I69.398,R56.9]                   Active Insurance as of 3/9/2025       Primary Coverage       Payor Plan Insurance Group Employer/Plan Group    [employer] EXCHANGE [employer] [place] EXCHANGE [id number]       Payor Plan Address Payor Plan Phone Number Payor Plan Fax Number Effective Dates    [address] [phone]  1/1/2025 - None Entered    [facility] [id number]         Subscriber Name Subscriber Birth Date Member ID       JOHN COOPER JR. 1985 [id number]                     Emergency Contacts        (Rel.) Home Phone Work Phone Mobile Phone    JEFF COOPER (Mother) [phone]    COLLEEN BOYERY (Friend) -- -- [phone]              [place]: NPI [id number]  Tax ID [id number]     History & Physical        HerthAmanda APRN at 03/09/25 1203       Attestation " signed by Annie Perdomo MD at 03/09/25 2013        SHARED APC FACE TO FACE: I performed a substantive part of the MDM during the patient's E/M visit. I personally evaluated and examined the patient. I personally made or approved the documented management plan and acknowledge its risk of complications.   Annie Perdomo MD 3/9/2025 20:11 EDT       JESSICA LEON ATTESTATION NOTE    SHARED VISIT: This visit was performed by BOTH a physician and an APC. The substantive portion of the medical decision making was performed by this attesting physician who made or approved the management plan and takes responsibility for patient management. All studies in the APC note (if performed) were independently interpreted by me.     The SHELBI and I have discussed this patient's history, physical exam, and treatment plan.  I have reviewed the documentation and personally had a face to face interaction with the patient. I provided a substantive portion of the care of the patient.  I affirm the documentation and agree with the treatment and plan.  The note below describes my personal findings:       S: Resting in bed with parents at bedside.  Patient complains of global headache that is mild.     O:  Exam  General : Resting in bed with parents at bedside, cooperative and conversant, well-appearing rather than his chronic hemiparesis  HEENT: eomi, no scleral icterus  Resp: Relaxed breathing  Neuro: alert and appropriate, hemiparesis from prior stroke     Assessment and Plan: 40-year-old male with a history significant for hemorrhagic stroke and hemiparesis admitted to the observation unit for new onset seizure.  Patient has already been evaluated by neurology and had an EEG this afternoon.  Patient treated preventatively with Keppra currently.  Family remarks on patient being on gabapentin for neuropathy with recent wean down and that dose.  Plan for neurosurgery consultation as well and MRI brain with and without contrast.   Continue neurochecks and telemetry monitoring.                   [facility]   HISTORY AND PHYSICAL    Patient Name: John Cooper Jr.  : 1985  MRN: [id number]  Primary Care Physician:  Nancy Mendez APRN  Date of admission: 3/9/2025    Subjective  Subjective     Chief Complaint:   Chief Complaint   Patient presents with    Seizures         HPI:    John Cooper Jr. is a pleasant afebrile 40 y.o. 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    He presents to the emergency department at [facility] today with complaint of new onset seizure.  He has been admitted to the ED observation unit for further testing and evaluation.    Patient is accompanied by his parents whom he resides with.  At baseline patient ambulates via wheelchair.  This morning he reports he was in his usual state of health, he subsequently remembers waking up on the floor.  Patient's mother reports that he had prolonged episode of tonic-clonic movement.  They had difficulty rolling him onto his side while awaiting EMS.    At present patient denies any discomfort, no tongue biting or musculoskeletal injury.  He does endorse feeling fatigued.  No recent gastrointestinal or respiratory illness    Review of Systems   All systems were reviewed and negative except for: what is mentioned above    Personal History     Past Medical History:   Diagnosis Date    Allergic     Stroke        Past Surgical History:   Procedure Laterality Date    ANKLE SURGERY Right 2015    MANDIBLE SURGERY Right 2019       Family History: family history includes Bone cancer in his brother; Colon cancer in his maternal grandmother; Hypertension in his father. Otherwise pertinent FHx was reviewed and not pertinent to current issue.    Social History:  reports that he has never smoked. He  has never been exposed to tobacco smoke. He has never used smokeless tobacco. He reports current alcohol use. He reports that he does not currently use drugs.    Home Medications:  acetaminophen, amLODIPine, carvedilol, doxazosin, gabapentin, sacubitril-valsartan, spironolactone, tiZANidine, traZODone, and vitamin C    Allergies:  Allergies   Allergen Reactions    Banana Unknown - High Severity     no problems with anesthesia    Latex Angioedema    Other Swelling     BANANAS, STATES LIPS BEGAN TO SWELL AND TINGLE       Objective  Objective     Vitals:   Temp:  [96.7 °F (35.9 °C)] 96.7 °F (35.9 °C)  Heart Rate:  [116] 116  Resp:  [18] 18  BP: (184)/(96) 184/96  Flow (L/min) (Oxygen Therapy):  [6] 6  Physical Exam    Constitutional: Awake, alert   Eyes: PERRLA, sclerae anicteric, no conjunctival injection   HENT: NCAT, mucous membranes moist   Neck: Supple, no thyromegaly, no lymphadenopathy, trachea midline   Respiratory: Clear to auscultation bilaterally, nonlabored respirations    Cardiovascular: RRR, no murmurs, rubs, or gallops, palpable pedal pulses bilaterally   Gastrointestinal: Positive bowel sounds, soft, nontender, nondistended   Musculoskeletal: No bilateral ankle edema, no clubbing or cyanosis to extremities   Psychiatric: Depressed/flat affect, cooperative   Neurologic: Oriented x 3, L sided hemiplegia, speech clear   Skin: No rashes     Result Review   Result Review:  I have personally reviewed the results from the time of this admission to 3/9/2025 12:03 EDT and agree with these findings:  [x]  Laboratory list / accordion  []  Microbiology  [x]  Radiology  []  EKG/Telemetry   []  Cardiology/Vascular   []  Pathology  []  Old records  []  Other:  Most notable findings include: lactic 7.3, creatinine 1.78, ,       Assessment & Plan  The ASCVD Risk score (Elisha TROY, et al., 2019) failed to calculate for the following reasons:    Risk score cannot be calculated because patient has a medical history  suggesting prior/existing ASCVD    Assessment / Plan     Brief Patient Summary:  John Cooper Jr. is a 40 y.o. male who is being evaluated for new onset seizure    Active Hospital Problems:  Active Hospital Problems    Diagnosis     **New onset seizure      Plan:     New onset seizure  Ct head shows postoperative findings from remote craniotomy but no acute findings  Consult to neurology  Consult to neurosurgery  Seizure precautions  Neuro checks every 4 hours    Acute kidney injury  Creatinine 1.78, trend with AM labs  1l bolus ordered    Hypertension  Vital signs every 4 hours  Hold spironolactone for JANET otherwise continue home medications    VTE Prophylaxis:  Mechanical VTE prophylaxis orders are present.        CODE STATUS:    Code Status (Patient has no pulse and is not breathing): CPR (Attempt to Resuscitate)  Medical Interventions (Patient has pulse or is breathing): Full Support  Level Of Support Discussed With: Patient    Admission Status:  I believe this patient meets observation status.    Electronically signed by MINESH Henriquez, 03/09/25, 12:03 PM EDT.    80 minutes has been spent by [facility] providers in the care of this patient while under observation status      I have worn appropriate PPE during this patient encounter, sanitized my hands both with entering and exiting patient's room.             Electronically signed by Annie Perdomo MD at 03/09/25 2013          Emergency Department Notes        Yury James MD at 03/09/25 0902           EMERGENCY DEPARTMENT ENCOUNTER  Room Number:  117/1  PCP: Nancy Mendez APRN  Independent Historians: Patient and EMS and patient's parents      HPI:  Chief Complaint: Seizure, hip pain    A complete HPI/ROS/PMH/PSH/SH/FH are unobtainable due to: None    Chronic or social conditions impacting patient care (Social Determinants of Health): None      Context: John Cooper Jr. is a 40 y.o. male with a medical history  of stroke, hypertension, sleep apnea and dilated cardiomyopathy who presents to the ED c/o acute seizure event this morning.  Patient lives at home with his parents.  He awoke at normal time this morning.  He had spoken with his parents.  His mother went to go prepare some breakfast.  And she heard some noise coming from his room and went to go check on him.  She saw him having a seizure, his right side of the body was banging against the rail on his bed.  She says that his left side of the body was completely stiffened.  He was unresponsive and was foaming at the mouth.  She says that the seizure lasted for very long time.  When paramedics arrived to the house, patient was postictal and required some bag ventilation assistance.  Upon arrival here, patient does not recall what happened.  He is complaining of some pain in the left hip area.  Denies any other areas of pain right now.  There have been no new changes to his medications.  He denies any recent fevers or flulike symptoms.      Review of prior external notes (non-ED) -and- Review of prior external test results outside of this encounter: I independently reviewed the CT without contrast study from February 21, 2024 which showed a developing right frontotemporal subdural hematoma with 4 mm of maximal dural separation but without significant mass effect.  There was chronic right ganglial capsular encephalomalacia as well.    Prescription drug monitoring program review: [facility] reviewed by Annie Perdomo MD, Yury James MD       PAST MEDICAL HISTORY  Active Ambulatory Problems     Diagnosis Date Noted    Primary hypertension 05/16/2023    Mixed hyperlipidemia 05/16/2023    Abnormal ECG 05/16/2023    Class 1 obesity due to excess calories without serious comorbidity with body mass index (BMI) of 33.0 to 33.9 in adult 05/16/2023    NICM (nonischemic cardiomyopathy) 07/18/2023    Acute HFrEF (heart failure with reduced ejection fraction) 07/18/2023     FAUSTINO (obstructive sleep apnea) 08/26/2024    Hemorrhagic stroke 08/26/2024    Central sleep apnea secondary to cerebrovascular accident (CVA) 11/11/2024    Psychophysiological insomnia 11/11/2024    Nerve pain 11/11/2024     Resolved Ambulatory Problems     Diagnosis Date Noted    No Resolved Ambulatory Problems     Past Medical History:   Diagnosis Date    Allergic     Stroke          PAST SURGICAL HISTORY  Past Surgical History:   Procedure Laterality Date    ANKLE SURGERY Right 2015    MANDIBLE SURGERY Right 2019         FAMILY HISTORY  Family History   Problem Relation Age of Onset    Hypertension Father     Bone cancer Brother     Colon cancer Maternal Grandmother          SOCIAL HISTORY  Social History     Socioeconomic History    Marital status: Single   Tobacco Use    Smoking status: Never     Passive exposure: Never    Smokeless tobacco: Never    Tobacco comments:     Social cigar smoker   Vaping Use    Vaping status: Never Used   Substance and Sexual Activity    Alcohol use: Yes     Comment: social    Drug use: Not Currently    Sexual activity: Defer         ALLERGIES  Banana, Latex, and Other      REVIEW OF SYSTEMS  Review of Systems  Included in HPI  All systems reviewed and negative except for those discussed in HPI.      PHYSICAL EXAM    I have reviewed the triage vital signs and nursing notes.    ED Triage Vitals   Temp Heart Rate Resp BP SpO2   03/09/25 0837 03/09/25 0834 03/09/25 0834 03/09/25 0834 03/09/25 0834   96.7 °F (35.9 °C) 116 18 (!) 184/96 100 %      Temp src Heart Rate Source Patient Position BP Location FiO2 (%)   03/09/25 0837 03/09/25 0834 -- 03/09/25 0834 --   Tympanic Monitor  Left arm        Physical Exam  GENERAL: alert, no acute distress  SKIN: Warm, dry, no rashes  HENT: Normocephalic, atraumatic.  Scar noted to the right scalp from prior craniotomy.  EYES: no scleral icterus, normal conjunctivae, pupils equally round and reactive, EOMI  CV: regular rhythm, regular rate, normal  pulses  RESPIRATORY: normal effort, lungs clear bilaterally  ABDOMEN: soft, nondistended, nontender, no masses  MUSCULOSKELETAL: no deformity.  Mild tenderness to palpation of the left hip area but patient can tolerate normal passive range of motion for flexion of the left hip.  NEURO: alert, follows commands, speech is normal.  Left upper and lower extremity weakness notable and consistent with prior history of hemorrhagic stroke with deficit      LAB RESULTS  Recent Results (from the past 24 hours)   Comprehensive Metabolic Panel    Collection Time: 03/09/25  9:10 AM    Specimen: Blood   Result Value Ref Range    Glucose 156 (H) 65 - 99 mg/dL    BUN 21 (H) 6 - 20 mg/dL    Creatinine 1.78 (H) 0.76 - 1.27 mg/dL    Sodium 138 136 - 145 mmol/L    Potassium 4.8 3.5 - 5.2 mmol/L    Chloride 101 98 - 107 mmol/L    CO2 19.0 (L) 22.0 - 29.0 mmol/L    Calcium 9.2 8.6 - 10.5 mg/dL    Total Protein 7.7 6.0 - 8.5 g/dL    Albumin 4.3 3.5 - 5.2 g/dL    ALT (SGPT) 17 1 - 41 U/L    AST (SGOT) 21 1 - 40 U/L    Alkaline Phosphatase 96 39 - 117 U/L    Total Bilirubin 0.2 0.0 - 1.2 mg/dL    Globulin 3.4 gm/dL    A/G Ratio 1.3 g/dL    BUN/Creatinine Ratio 11.8 7.0 - 25.0    Anion Gap 18.0 (H) 5.0 - 15.0 mmol/L    eGFR 48.5 (L) >60.0 mL/min/1.73   Ethanol    Collection Time: 03/09/25  9:10 AM    Specimen: Blood   Result Value Ref Range    Ethanol <10 0 - 10 mg/dL    Ethanol % <0.010 %   CBC Auto Differential    Collection Time: 03/09/25  9:10 AM    Specimen: Blood   Result Value Ref Range    WBC 5.03 3.40 - 10.80 10*3/mm3    RBC 4.46 4.14 - 5.80 10*6/mm3    Hemoglobin 12.7 (L) 13.0 - 17.7 g/dL    Hematocrit 38.5 37.5 - 51.0 %    MCV 86.3 79.0 - 97.0 fL    MCH 28.5 26.6 - 33.0 pg    MCHC 33.0 31.5 - 35.7 g/dL    RDW 13.4 12.3 - 15.4 %    RDW-SD 42.1 37.0 - 54.0 fl    MPV 12.1 (H) 6.0 - 12.0 fL    Platelets 217 140 - 450 10*3/mm3    Neutrophil % 64.2 42.7 - 76.0 %    Lymphocyte % 28.0 19.6 - 45.3 %    Monocyte % 3.6 (L) 5.0 - 12.0 %     Eosinophil % 2.6 0.3 - 6.2 %    Basophil % 0.6 0.0 - 1.5 %    Immature Grans % 1.0 (H) 0.0 - 0.5 %    Neutrophils, Absolute 3.23 1.70 - 7.00 10*3/mm3    Lymphocytes, Absolute 1.41 0.70 - 3.10 10*3/mm3    Monocytes, Absolute 0.18 0.10 - 0.90 10*3/mm3    Eosinophils, Absolute 0.13 0.00 - 0.40 10*3/mm3    Basophils, Absolute 0.03 0.00 - 0.20 10*3/mm3    Immature Grans, Absolute 0.05 0.00 - 0.05 10*3/mm3    nRBC 0.0 0.0 - 0.2 /100 WBC   Lactic Acid, Plasma    Collection Time: 03/09/25  9:10 AM    Specimen: Blood   Result Value Ref Range    Lactate 7.3 (C) 0.5 - 2.0 mmol/L   Urinalysis With Microscopic If Indicated (No Culture) - Urine, Clean Catch    Collection Time: 03/09/25 11:47 AM    Specimen: Urine, Clean Catch   Result Value Ref Range    Color, UA Yellow Yellow, Straw    Appearance, UA Clear Clear    pH, UA 5.5 5.0 - 8.0    Specific Gravity, UA 1.017 1.005 - 1.030    Glucose, UA Negative Negative    Ketones, UA Negative Negative    Bilirubin, UA Negative Negative    Blood, UA Small (1+) (A) Negative    Protein, UA 30 mg/dL (1+) (A) Negative    Leuk Esterase, UA Negative Negative    Nitrite, UA Negative Negative    Urobilinogen, UA 0.2 E.U./dL 0.2 - 1.0 E.U./dL   Urine Drug Screen - Urine, Clean Catch    Collection Time: 03/09/25 11:47 AM    Specimen: Urine, Clean Catch   Result Value Ref Range    THC, Screen, Urine Negative Negative    Phencyclidine (PCP), Urine Negative Negative    Cocaine Screen, Urine Negative Negative    Methamphetamine, Ur Negative Negative    Opiate Screen Negative Negative    Amphetamine Screen, Urine Negative Negative    Benzodiazepine Screen, Urine Negative Negative    Tricyclic Antidepressants Screen Negative Negative    Methadone Screen, Urine Negative Negative    Barbiturates Screen, Urine Negative Negative    Oxycodone Screen, Urine Negative Negative    Buprenorphine, Screen, Urine Negative Negative   Fentanyl, Urine - Urine, Clean Catch    Collection Time: 03/09/25 11:47 AM     Specimen: Urine, Clean Catch   Result Value Ref Range    Fentanyl, Urine Negative Negative   Urinalysis, Microscopic Only - Urine, Clean Catch    Collection Time: 03/09/25 11:47 AM    Specimen: Urine, Clean Catch   Result Value Ref Range    RBC, UA 0-2 None Seen, 0-2 /HPF    WBC, UA 0-2 None Seen, 0-2 /HPF    Bacteria, UA None Seen None Seen /HPF    Squamous Epithelial Cells, UA 0-2 None Seen, 0-2 /HPF    Hyaline Casts, UA 0-2 None Seen /LPF    Methodology Automated Microscopy    STAT Lactic Acid, Reflex    Collection Time: 03/09/25  2:22 PM    Specimen: Arm, Right; Blood   Result Value Ref Range    Lactate 1.2 0.5 - 2.0 mmol/L         RADIOLOGY  CT Head Without Contrast  Result Date: 3/9/2025  CT HEAD WO CONTRAST-  DATE OF EXAM: 3/9/2025 10:34 AM  INDICATION: Seizure. Head trauma.  COMPARISON: None available. Correlation is made with the reports from the prior head CTs performed at an outside facility on 2/21/2024 and 1/23/2024 with the imaging not currently available for direct comparison.  TECHNIQUE: Multiple contiguous axial images were acquired through the head without the intravenous administration of contrast. Reformatted coronal and sagittal sequences were also reviewed. Radiation dose reduction techniques were utilized, including automated exposure control and exposure modulation based on body size.  FINDINGS: No acute intracranial hemorrhage or mass/mass effect. Postoperative changes from right calvarial craniotomy and pterional craniectomy with a 4 cm x 1.5 cm subcutaneous/subgaleal CSF attenuation collection overlying the pterional craniectomy consistent with a pseudomeningocele. Large region of encephalomalacia in the right frontal and temporal lobes and the right basal ganglia, likely from old infarct or hemorrhage. Associated ex vacuo dilatation of the right lateral ventricle. No hydrocephalus. Minimal 6 mm left to right midline shift. The basilar cisterns are patent. Gray-white matter differentiation  is otherwise grossly maintained. Limited imaging of the orbits, paranasal sinuses, and mastoid air cells is unremarkable. No acute osseous abnormality is identified.       1. No acute intracranial hemorrhage or mass/mass effect. 2. Right frontal temporal and right basal ganglia encephalomalacia, likely from old infarct or hemorrhage, with associated ex vacuo dilatation of the right lateral ventricle and mild left to right midline shift. 3. Postoperative changes from right calvarial craniotomy and right pterional craniectomy with a 4 cm x 1.5 cm subcutaneous/subgaleal CSF attenuation collection overlying the pterional craniectomy, consistent with a pseudomeningocele. Recommend comparison to prior imaging if available  This report was finalized on 3/9/2025 11:21 AM by Mino Estrada MD on Workstation: [id number]      XR Hip With or Without Pelvis 2 - 3 View Left  Result Date: 3/9/2025  XR HIP W OR WO PELVIS 2-3 VIEW LEFT-  INDICATIONS: Pain  TECHNIQUE: 5 VIEWS INCLUDING THE PELVIS AND LEFT HIP  COMPARISON: None available  FINDINGS:  No acute fracture, erosion, or dislocation is identified. Left hip joint space appears preserved. Mild right hip joint space narrowing is apparent. Nonspecific elongated sclerotic focus is apparent in the right intertrochanteric region. Follow-up/further evaluation can be obtained as indications persist.       As described.    This report was finalized on 3/9/2025 10:27 AM by Dr. Maco Wright M.D on Workstation: [id number]          MEDICATIONS GIVEN IN ER  Medications   sodium chloride 0.9 % flush 10 mL (has no administration in time range)   sodium chloride 0.9 % flush 10 mL (10 mL Intravenous Given 3/9/25 1420)   sodium chloride 0.9 % flush 10 mL (has no administration in time range)   sodium chloride 0.9 % infusion 40 mL (has no administration in time range)   ondansetron ODT (ZOFRAN-ODT) disintegrating tablet 4 mg (has no administration in time range)     Or   ondansetron  (ZOFRAN) injection 4 mg (has no administration in time range)   sennosides-docusate (PERICOLACE) 8.6-50 MG per tablet 2 tablet (has no administration in time range)     And   polyethylene glycol (MIRALAX) packet 17 g (has no administration in time range)     And   bisacodyl (DULCOLAX) EC tablet 5 mg (has no administration in time range)     And   bisacodyl (DULCOLAX) suppository 10 mg (has no administration in time range)   levETIRAcetam (KEPPRA) injection 500 mg (has no administration in time range)   spironolactone (ALDACTONE) tablet 25 mg ( Oral Dose Auto Held 3/18/25 0900)   acetaminophen (TYLENOL) tablet 650 mg (has no administration in time range)   tiZANidine (ZANAFLEX) tablet 4 mg (has no administration in time range)   ascorbic acid (VITAMIN C) tablet 250 mg (has no administration in time range)   gabapentin (NEURONTIN) capsule 100 mg (has no administration in time range)   traZODone (DESYREL) tablet 50 mg (has no administration in time range)   carvedilol (COREG) tablet 12.5 mg (has no administration in time range)   sacubitril-valsartan (ENTRESTO) 24-26 MG tablet 1 tablet (has no administration in time range)   amLODIPine (NORVASC) tablet 5 mg (has no administration in time range)   terazosin (HYTRIN) capsule 5 mg (has no administration in time range)   levETIRAcetam (KEPPRA) injection 1,000 mg (1,000 mg Intravenous Given 3/9/25 0917)   acetaminophen (TYLENOL) tablet 500 mg (500 mg Oral Given 3/9/25 1144)   sodium chloride 0.9 % bolus 1,000 mL (1,000 mL Intravenous New Bag 3/9/25 1420)         ORDERS PLACED DURING THIS VISIT:  Orders Placed This Encounter   Procedures    XR Hip With or Without Pelvis 2 - 3 View Left    CT Head Without Contrast    CT Outside Films    CT Outside Films    CT Outside Films    Comprehensive Metabolic Panel    Urinalysis With Microscopic If Indicated (No Culture) - Urine, Clean Catch    Ethanol    Urine Drug Screen - Urine, Clean Catch    CBC Auto Differential    Lactic Acid,  Plasma    STAT Lactic Acid, Reflex    CBC Auto Differential    Basic Metabolic Panel    Fentanyl, Urine - Urine, Clean Catch    Urinalysis, Microscopic Only - Urine, Clean Catch    Diet: Regular/House; Fluid Consistency: Thin (IDDSI 0)    Vital Signs    Up With Assistance    Advance Diet As Tolerated -    Intake & Output    Weigh Patient    Neuro Checks    Saline Lock & Maintain IV Access    Place Sequential Compression Device    Maintain Sequential Compression Device    Code Status and Medical Interventions: CPR (Attempt to Resuscitate); Full Support    Inpatient Neurology Consult General    Neurosurgery (on-call MD unless specified)    Inpatient Neurosurgery Consult    Inpatient Neurology Consult General    OT Consult: Eval & Treat ADL Performance Below Baseline    PT Consult: Eval & Treat Functional Mobility Below Baseline    EEG    Insert Peripheral IV    Insert Peripheral IV    Initiate Emergency Department Observation Status    Fall Precautions    Aspiration Precautions    Seizure Precautions    CBC & Differential         OUTPATIENT MEDICATION MANAGEMENT:  Current Facility-Administered Medications Ordered in Epic   Medication Dose Route Frequency Provider Last Rate Last Admin    acetaminophen (TYLENOL) tablet 650 mg  650 mg Oral Q6H PRN Amanda Proctor, MINESH        amLODIPine (NORVASC) tablet 5 mg  5 mg Oral Nightly Amanda Proctor, MINESH        ascorbic acid (VITAMIN C) tablet 250 mg  250 mg Oral Daily Amanda Proctor, APRTREY        sennosides-docusate (PERICOLACE) 8.6-50 MG per tablet 2 tablet  2 tablet Oral BID PRN Amanda Proctor APRN        And    polyethylene glycol (MIRALAX) packet 17 g  17 g Oral Daily PRN Amanda Proctor APRN        And    bisacodyl (DULCOLAX) EC tablet 5 mg  5 mg Oral Daily PRN Amanda Proctor APRN        And    bisacodyl (DULCOLAX) suppository 10 mg  10 mg Rectal Daily PRN Amanda Proctor, MINESH        carvedilol (COREG) tablet 12.5 mg  12.5 mg Oral BID With Meals Amanda Proctor, MINESH        gabapentin  (NEURONTIN) capsule 100 mg  100 mg Oral Nightly [facility]        levETIRAcetam (KEPPRA) injection 500 mg  500 mg Intravenous Q12H Robi Vasquez MD        ondansetron ODT (ZOFRAN-ODT) disintegrating tablet 4 mg  4 mg Oral Q6H PRN [facility]        Or    ondansetron (ZOFRAN) injection 4 mg  4 mg Intravenous Q6H PRN [facility]        sacubitril-valsartan (ENTRESTO) 24-26 MG tablet 1 tablet  1 tablet Oral BID [facility] APR[facility]        sodium chloride 0.9 % flush 10 mL  10 mL Intravenous PRN [facility] AmandaMINESH        sodium chloride 0.9 % flush 10 mL  10 mL Intravenous Q12H [facility], APRN   10 mL at 03/09/25 1420    sodium chloride 0.9 % flush 10 mL  10 mL Intravenous PRN [facility]        sodium chloride 0.9 % infusion 40 mL  40 mL Intravenous PRN [facility] AmandaMINESH        [Held by provider] spironolactone (ALDACTONE) tablet 25 mg  25 mg Oral Daily [facility] Amanda, APRTREY        terazosin (HYTRIN) capsule 5 mg  5 mg Oral Q12H [facility] Amanda, MINESH        tiZANidine (ZANAFLEX) tablet 4 mg  4 mg Oral TID [facility] Amanda, MINESH        traZODone (DESYREL) tablet 50 mg  50 mg Oral Nightly [facility] Amanda, MINESH         No current Epic-ordered outpatient medications on file.         PROCEDURES  Procedures      Critical care provider statement:    Critical care time (minutes): 33.   Critical care time was exclusive of:  Separately billable procedures and treating other patients   Critical care was necessary to treat or prevent imminent or life-threatening deterioration of the following conditions:  CNS Failure   Critical care was time spent personally by me on the following activities:  Development of treatment plan with patient or surrogate, discussions with consultants, evaluation of patient's response to treatment, examination of patient, obtaining history from patient or surrogate, ordering and performing treatments and interventions, ordering and review of laboratory studies, ordering and  review of radiographic studies, pulse oximetry, re-evaluation of patient's condition and review of old charts. Critical Care indicators: Seizure, new onset or with disorder with intensive drug management       PROGRESS, DATA ANALYSIS, CONSULTS, AND MEDICAL DECISION MAKING  All labs have been independently interpreted by me.  All radiology studies have been reviewed by me. All EKG's have been independently viewed and interpreted by me.  Discussion below represents my analysis of pertinent findings related to patient's condition, differential diagnosis, treatment plan and final disposition.    Differential diagnosis includes but is not limited to subdural hematoma, subarachnoid hemorrhage, epileptic seizure, hypoglycemia, hyponatremia.    Clinical Scores:                   ED Course as of 03/09/25 1542   Sun Mar 09, 2025   1017 Lactate(!!): 7.3 [REBECCA]   1017 BUN(!): 21 [REBECCA]   1017 Creatinine(!): 1.78 [REBECCA]   1045 I independently interpreted the x-ray of the left hip and pelvis and my findings are: No fracture or dislocation. [REBECCA]   1132 Sodium: 138 [REBECCA]   1133 Independently interpreted the CT head without contrast study and my findings are: No acute hemorrhage.  Right frontotemporal encephalomalacia is noted [REBECCA]   1150 Discussed with Dr. Vasquez from neurology about this patient.  He agrees to consult.  He recommends that we also consult neurosurgery for further recommendation about possible additional imaging that is necessary. [REBECCA]   1150 I discussed with Amanda Proctor in the observation unit about this patient.  She agrees to admit him for further medical management today on behalf of Dr. Perdomo [REBECCA]      ED Course User Index  [REBECCA] Yury James MD         AS OF 15:42 EDT VITALS:    BP - 117/76  HR - 76  TEMP - 96.7 °F (35.9 °C) (Tympanic)  O2 SATS - 96%    COMPLEXITY OF CARE  The patient requires admission.      DIAGNOSIS  Final diagnoses:   New onset seizure   Left leg pain         DISPOSITION  ED Disposition        ED Disposition   Intended Admit    Condition   --    Comment   --                Please note that portions of this document were completed with a voice recognition program.    Note Disclaimer: At [facility], we believe that sharing information builds trust and better relationships. You are receiving this note because you recently visited [facility]. It is possible you will see health information before a provider has talked with you about it. This kind of information can be easy to misunderstand. To help you fully understand what it means for your health, we urge you to discuss this note with your provider.         Yury James MD  03/09/25 1541       Yury James MD  03/09/25 1542      Electronically signed by Yury James MD at 03/09/25 1542       Liudmila Abreu, RN at 03/09/25 0831          Pt to ed from home via EMS    Pt family reports seizure like activity. Unknown how long it lasted. When EMS arrived fire was bagging pt and pt was postictal. Pt alert and oriented in triage. Pt family reports hx seizure and stroke. Pt was in bed during seizure, no injuries reported.     Electronically signed by Liudmila Abreu, RN at 03/09/25 0838       Current Facility-Administered Medications   Medication Dose Route Frequency Provider Last Rate Last Admin    acetaminophen (TYLENOL) tablet 650 mg  650 mg Oral Q6H PRN Amanda Proctor APRN   650 mg at 03/09/25 1937    amLODIPine (NORVASC) tablet 7.5 mg  7.5 mg Oral Nightly Stefany Lopez MD        ascorbic acid (VITAMIN C) tablet 250 mg  250 mg Oral Daily Stefany Lopez MD   250 mg at 03/12/25 1404    baclofen (LIORESAL) tablet 15 mg  15 mg Oral TID Stefany Lopez MD   15 mg at 03/12/25 1403    sennosides-docusate (PERICOLACE) 8.6-50 MG per tablet 2 tablet  2 tablet Oral BID PRN Amanda Proctor APRN        And    polyethylene glycol (MIRALAX) packet 17 g  17 g Oral Daily PRN Amanda Proctor APRN        And    bisacodyl (DULCOLAX) EC  tablet 5 mg  5 mg Oral Daily PRN Amanda Proctor APRN        And    bisacodyl (DULCOLAX) suppository 10 mg  10 mg Rectal Daily PRN Amanda Proctor APRN        carvedilol (COREG) tablet 12.5 mg  12.5 mg Oral BID With Meals Amanda Proctor APRN   12.5 mg at 25 0859    DULoxetine (CYMBALTA) DR capsule 40 mg  40 mg Oral Daily Diann Bar APRN   40 mg at 25 0859    gabapentin (NEURONTIN) capsule 100 mg  100 mg Oral Nightly Amanda Proctor APRN   100 mg at 254    levETIRAcetam (KEPPRA) tablet 500 mg  500 mg Oral BID Rei Lee MD        ondansetron ODT (ZOFRAN-ODT) disintegrating tablet 4 mg  4 mg Oral Q6H PRN Amanda Proctor APRN        Or    ondansetron (ZOFRAN) injection 4 mg  4 mg Intravenous Q6H PRN Amanda Proctor APRN   4 mg at 25 0445    sodium chloride 0.9 % infusion  125 mL/hr Intravenous Continuous Stefany Lopez  mL/hr at 25 1407 150 mL/hr at 25 1407    [Held by provider] spironolactone (ALDACTONE) tablet 25 mg  25 mg Oral Daily Amanda Proctor APRN        terazosin (HYTRIN) capsule 5 mg  5 mg Oral Q12H Amanda Proctor APRN   5 mg at 25 0859    tiZANidine (ZANAFLEX) tablet 3 mg  3 mg Oral TID Stefany Lopez MD   3 mg at 25 1404    traZODone (DESYREL) tablet 50 mg  50 mg Oral Nightly Amanda Proctor APRN   50 mg at 25 2124        Physician Progress Notes (last 72 hours)        Rei Lee MD at 25 1258              Name: John Cooper JrGalen ADMIT: 3/9/2025   : 1985  PCP: Nancy Mendez APRN    MRN: [id number] LOS: 1 days   AGE/SEX: 40 y.o. male  ROOM: [facility]     Subjective   Subjective   A little groggy.  Has some soreness in his tongue and in his chest otherwise doing okay.      Objective   Objective   Vital Signs  Temp:  [97.6 °F (36.4 °C)-98.6 °F (37 °C)] 98.6 °F (37 °C)  Heart Rate:  [66-90] 81  Resp:  [18-19] 18  BP: (115-168)/(76-99) 115/83  SpO2:  [92 %-95 %] 94 %  on   ;   Device (Oxygen Therapy): room air  Body mass  index is 30.11 kg/m².  Physical Exam  Vitals and nursing note reviewed.   Constitutional:       General: He is not in acute distress.     Appearance: He is ill-appearing (Chronically).   Cardiovascular:      Rate and Rhythm: Normal rate and regular rhythm.   Pulmonary:      Effort: Pulmonary effort is normal.      Breath sounds: Normal breath sounds.   Abdominal:      General: Bowel sounds are normal.      Palpations: Abdomen is soft.      Tenderness: There is no abdominal tenderness.   Musculoskeletal:         General: No swelling.   Skin:     General: Skin is warm and dry.   Neurological:      Mental Status: He is alert.      Comments: Left-sided weakness       Results Review     I reviewed the patient's new clinical results.  Results from last 7 days   Lab Units 03/11/25  0547 03/10/25  0530 03/09/25  0910   WBC 10*3/mm3 5.03 5.09 5.03   HEMOGLOBIN g/dL 12.2* 11.7* 12.7*   PLATELETS 10*3/mm3 191 212 217     Results from last 7 days   Lab Units 03/12/25 0640 03/11/25  0547 03/10/25  0530 03/09/25  0910   SODIUM mmol/L 141 142 140 138   POTASSIUM mmol/L 4.1 4.0 4.5 4.8   CHLORIDE mmol/L 106 108* 108* 101   CO2 mmol/L 21.8* 21.9* 22.0 19.0*   BUN mg/dL 10 12 17 21*   CREATININE mg/dL 1.23 1.35* 1.65* 1.78*   GLUCOSE mg/dL 92 96 101* 156*   EGFR mL/min/1.73 75.6 67.6 53.2* 48.5*     Results from last 7 days   Lab Units 03/12/25  0640 03/09/25  0910   ALBUMIN g/dL 3.7 4.3   BILIRUBIN mg/dL 0.2 0.2   ALK PHOS U/L 77 96   AST (SGOT) U/L 33 21   ALT (SGPT) U/L 16 17     Results from last 7 days   Lab Units 03/12/25  0640 03/11/25  0547 03/10/25  0530 03/09/25  0910   CALCIUM mg/dL 8.4* 8.3* 8.5* 9.2   ALBUMIN g/dL 3.7  --   --  4.3   MAGNESIUM mg/dL 1.8  --   --   --    PHOSPHORUS mg/dL 3.2  --   --   --      Results from last 7 days   Lab Units 03/09/25  1422 03/09/25  0910   LACTATE mmol/L 1.2 7.3*     Results from last 7 days   Lab Units 03/12/25  0640 03/11/25  1218 03/11/25  0547 03/11/25  0000 03/10/25  1816   CK  TOTAL U/L 6,575* 8,885* 8,786* 8,652* 8,977*       I have personally reviewed all medications:  Scheduled Medications  amLODIPine, 7.5 mg, Oral, Nightly  vitamin C, 250 mg, Oral, Daily  baclofen, 15 mg, Oral, TID  carvedilol, 12.5 mg, Oral, BID With Meals  DULoxetine, 40 mg, Oral, Daily  gabapentin, 100 mg, Oral, Nightly  levETIRAcetam, 500 mg, Intravenous, Q12H  sodium chloride, 10 mL, Intravenous, Q12H  [Held by provider] spironolactone, 25 mg, Oral, Daily  terazosin, 5 mg, Oral, Q12H  tiZANidine, 3 mg, Oral, TID  traZODone, 50 mg, Oral, Nightly    Infusions  sodium chloride, 125 mL/hr, Last Rate: 150 mL/hr (03/12/25 1407)    Diet  Diet: Regular/House; Fluid Consistency: Thin (IDDSI 0)    I have personally reviewed:  [x]  Laboratory   [x]  Microbiology   [x]  Radiology   [x]  EKG/Telemetry  [x]  Cardiology/Vascular   []  Pathology    []  Records      Assessment/Plan     Active Hospital Problems    Diagnosis  POA    **Seizure, late effect of stroke [I69.398, R56.9]  Not Applicable    Acute kidney injury [N17.9]  Yes    Rhabdomyolysis [M62.82]  Yes    New onset seizure [R56.9]  Yes    FAUSTINO (obstructive sleep apnea) [G47.33]  Yes    Primary hypertension [I10]  Yes      Resolved Hospital Problems   No resolved problems to display.       40 y.o. male admitted with Seizure, late effect of stroke and JANET secondary to rhabdomyolysis.    Discussed with neurology.  No further workup indicated at this point.  Will change IV Keppra over to oral.  Continue seizure precautions.  CPK is starting to trend down.  Renal function improving.  Appreciate nephrology assistance remains on IV fluids at 150 cc/h.  Continue daily monitoring labs.  PT/OT.       SCDs for DVT prophylaxis.  Full code.  Discussed with patient, family, and consulting provider.  Anticipate discharge home with family in 2-3 days.  Expected Discharge Date: 3/14/2025; Expected Discharge Time:       Rei Lee MD  [facility]  03/12/25  15:48  EDT    Electronically signed by Rei Lee MD at 25 1542       Stefany Lopez MD at 25 0712              [facility] Progress Note      Patient Name: John Cooper Jr.  : 1985  MRN: [id number]  Primary Care Physician:  Nancy Mendez APRN  Date of admission: 3/9/2025    Subjective     Interval History:   Follow up elevated CK and JANET.  UOP excellent. BP up some, but did not take coreg last night. Family at bedside. A little hungry. One bm since admission.  No real change in spasticity with increase in baclofen last month to 20 mg tid.  More sleepy with this dose.     Review of Systems:   As noted above    Objective     Vitals:   Temp:  [97.6 °F (36.4 °C)-98.6 °F (37 °C)] 97.6 °F (36.4 °C)  Heart Rate:  [66-79] 78  Resp:  [18-19] 18  BP: (123-142)/(76-98) 127/98    Intake/Output Summary (Last 24 hours) at 3/12/2025 0712  Last data filed at 3/12/2025 0448  Gross per 24 hour   Intake 240 ml   Output 1800 ml   Net -1560 ml       Physical Exam:    General Appearance: alert, no acute distress   Skin: warm and dry  HEENT: oral mucosa normal, nonicteric sclera  Neck: supple, no JVD  Lungs: clear to auscultation.   Heart: RRR, normal S1 and S2  Abdomen: soft, nontender, nondistended. +bs  : no palpable bladder  Extremities: no edema. Muscles soft.   Neuro: normal speech and mental status     Scheduled Meds:     amLODIPine, 7.5 mg, Oral, Nightly  vitamin C, 250 mg, Oral, Daily  baclofen, 15 mg, Oral, TID  carvedilol, 12.5 mg, Oral, BID With Meals  DULoxetine, 40 mg, Oral, Daily  gabapentin, 100 mg, Oral, Nightly  levETIRAcetam, 500 mg, Intravenous, Q12H  sodium chloride, 10 mL, Intravenous, Q12H  [Held by provider] spironolactone, 25 mg, Oral, Daily  terazosin, 5 mg, Oral, Q12H  tiZANidine, 3 mg, Oral, TID  traZODone, 50 mg, Oral, Nightly      IV Meds:   sodium chloride, 150 mL/hr, Last Rate: 150 mL/hr (25 2784)  sodium chloride, 150 mL/hr, Last Rate: 150 mL/hr  (03/12/25 0631)        Results Reviewed:   I have personally reviewed the results from the time of this admission to 3/12/2025 07:12 EDT     Results from last 7 days   Lab Units 03/11/25  0547 03/10/25  0530 03/09/25  0910   SODIUM mmol/L 142 140 138   POTASSIUM mmol/L 4.0 4.5 4.8   CHLORIDE mmol/L 108* 108* 101   CO2 mmol/L 21.9* 22.0 19.0*   BUN mg/dL 12 17 21*   CREATININE mg/dL 1.35* 1.65* 1.78*   CALCIUM mg/dL 8.3* 8.5* 9.2   BILIRUBIN mg/dL  --   --  0.2   ALK PHOS U/L  --   --  96   ALT (SGPT) U/L  --   --  17   AST (SGOT) U/L  --   --  21   GLUCOSE mg/dL 96 101* 156*       Estimated Creatinine Clearance: 78.4 mL/min (A) (by C-G formula based on SCr of 1.35 mg/dL (H)).                Results from last 7 days   Lab Units 03/11/25  0547 03/10/25  0530 03/09/25  0910   WBC 10*3/mm3 5.03 5.09 5.03   HEMOGLOBIN g/dL 12.2* 11.7* 12.7*   PLATELETS 10*3/mm3 191 212 217             Assessment / Plan     ASSESSMENT:  JANET , likely multifactorial including rhabdomyolysis with elevated CPK after new onset seizure in the setting of sacubitril valsartan.  Urine with small blood, no red cells.  Nonoliguric.  On IV fluids at rate of 150/h.  His creatinine  improved yesterday to 1.35 from a peak of 1.78 days ago.  No evidence of compartment syndrome by exam.   2.  New onset seizure.  Neurology evaluation.  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  Now on Keppra.  Baclofen recently increased to 20 mg tid with reduction and weaning off Gabapentin. More sleepy lately.  I have reduced baclofen to 15 mg tid and tizanidine to 3 mg tid.  Slowly reducing.  WIll DW neurology.   He is followed by [facility] physical medicine .  3.  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  Left side spastic hemiplegia.  4.  Prior history of heart failure reduced ejection fraction, EF on most recent echo 1/28/2025 normal.  5.   Hypertension.  Hold sacubitril/valsartan for now until renal function returns to normal and CK is normalized.  Continue Terazosin twice daily, home Coreg and Norvasc.  Watch blood pressure with aggressive IV fluid rate.  No evidence of volume excess for now.  6. Obstructive sleep apnea    PLAN:  YAQUELIN neurology regarding reducing Baclofen to 10 mg tid.  Check pending chemistries  Do not think he needs cardiac monitoring. Will YAQUELIN Lee.   Increase norvasc to 7.5 mg hs if bp remains up today. ( Did not take coreg last night).  Continue IVF while CPK so high.  PT.     Thank you for involving us in the care of John Cooper Jr..  Please feel free to call with any questions.    Stefany Lopez MD  03/12/25  07:12 EDT    [facility]  [phone]    Please note that portions of this note were completed with a voice recognition program.    Electronically signed by Stefany Lopez MD at 03/12/25 0719       Diann Bar APRN at 03/11/25 0618          ED OBSERVATION PROGRESS/DISCHARGE SUMMARY    Date of Admission: 3/9/2025   LOS: 0 days   PCP: Nancy Mendez APRN      Hospital Outcome:     John Cooper Jr. is a 40 y.o. male presents status post witnessed tonic-clonic seizure lasting 7 to 10 minutes and required BVM support when EMS arrived.  He had 1 prior seizure-like episode during recent hospitalization for intracranial bleed and cranioplasty.  His EEG during this hospitalization was abnormal due to mild to moderate diffuse background slowing more focal right hemisphere and inner ear canal discharge.  CT head MRI brain without acute findings, there is right sided encephalomalacia consistent with recent infarct or hemorrhage as well as wallerian degeneration.  Lactic acid was initially 7.3, improved to 1.2 after IV hydration.  Initial CK was 806 and this morning went up to 8797 therefore patient received 1 L of NS and was started on NS at 150 mL/hr. surgery evaluated patient and  recommends that he follows up with neurosurgery at [facility].  Neurology also reevaluated patient and plans for outpatient follow-up but and has since signed off.  We will repeat CK in a.m. and anticipate that he will be discharged home if it is trending downward.     3/11/2025  Patient had an episode of vomiting overnight.  CK still elevated overnight at 8652.  Otherwise patient sleeping overnight.     1600  Patient CK continues to be elevated despite receiving 2 L of fluids today with repeat CK this afternoon of 8885.  Discussed the case with Dr. Byrd with [facility] and he graciously accepted patient.    ROS:  General: no fevers, chills  Respiratory: no cough, dyspnea  Cardiovascular: no chest pain, palpitations  Abdomen: No abdominal pain, nausea, vomiting, or diarrhea  Neurologic: No focal weakness    Objective   Physical Exam:  I have reviewed the vital signs.  Temp:  [97.9 °F (36.6 °C)-98.8 °F (37.1 °C)] 97.9 °F (36.6 °C)  Heart Rate:  [67-84] 71  Resp:  [18] 18  BP: (109-136)/(70-92) 121/85  General Appearance:    Alert, cooperative, no distress  Head:    Normocephalic, atraumatic  Eyes:    Sclerae anicteric  Neck:   Supple, no mass  Lungs: Clear to auscultation bilaterally, respirations unlabored  Heart: Regular rate and rhythm, S1 and S2 normal, no murmur, rub or gallop  Abdomen:  Soft, nontender, bowel sounds active, nondistended  Extremities: No clubbing, cyanosis, or edema to lower extremities  Pulses:  2+ and symmetric in distal lower extremities  Skin: No rashes   Neurologic: Oriented x3, Normal strength to extremities    Results Review:    I have reviewed the labs, radiology results and diagnostic studies.    Results from last 7 days   Lab Units 03/11/25  0547   WBC 10*3/mm3 5.03   HEMOGLOBIN g/dL 12.2*   HEMATOCRIT % 37.1*   PLATELETS 10*3/mm3 191     Results from last 7 days   Lab Units 03/10/25  0530 03/09/25  0910   SODIUM mmol/L 140 138   POTASSIUM mmol/L 4.5 4.8   CHLORIDE mmol/L 108* 101   CO2 mmol/L  22.0 19.0*   BUN mg/dL 17 21*   CREATININE mg/dL 1.65* 1.78*   CALCIUM mg/dL 8.5* 9.2   BILIRUBIN mg/dL  --  0.2   ALK PHOS U/L  --  96   ALT (SGPT) U/L  --  17   AST (SGOT) U/L  --  21   GLUCOSE mg/dL 101* 156*     Imaging Results (Last 24 Hours)       ** No results found for the last 24 hours. **            I have reviewed the medications.     Discharge Medications        ASK your doctor about these medications        Instructions Start Date   acetaminophen 325 MG tablet  Commonly known as: TYLENOL   650 mg, Every 6 Hours PRN      amLODIPine 5 MG tablet  Commonly known as: NORVASC   5 mg, Oral, Nightly      carvedilol 12.5 MG tablet  Commonly known as: COREG   12.5 mg, Oral, 2 Times Daily With Meals      doxazosin 8 MG tablet  Commonly known as: CARDURA   8 mg, Oral, 2 Times Daily      DULoxetine HCl 40 MG capsule delayed-release particles   40 mg, Daily      Entresto 24-26 MG tablet  Generic drug: sacubitril-valsartan   1 tablet, Oral, 2 Times Daily      gabapentin 300 MG capsule  Commonly known as: NEURONTIN   100 mg, Nightly      spironolactone 25 MG tablet  Commonly known as: ALDACTONE   25 mg, Oral, Daily      tiZANidine 4 MG tablet  Commonly known as: ZANAFLEX   4 mg, Oral, 3 Times Daily      traZODone 50 MG tablet  Commonly known as: DESYREL    mg, Oral, Nightly      vitamin C 250 MG tablet  Commonly known as: ASCORBIC ACID   250 mg, Oral, Daily, Take with iron              ---------------------------------------------------------------------------------------------  Assessment & Plan   Assessment/Problem List    New onset seizure      Plan:    New onset seizure  Ct head shows postoperative findings from remote craniotomy but no acute findings  MRI no evidence right-sided encephalomalacia and malarian changes.  Scattered chronic microhemorrhages as well as patchy meningeal thickening.  Neurology following, loaded Keppra and okay to discharge with follow-up once seizure-free for 24 hours  Consult to  neurosurgery for right scalp fluid collection.  Seizure precautions  Neuro checks every 4 hours    Rhabdomyolysis  Elevated CK  -Initially 800, recheck 8797, minimally improved now to 8652, trend     Acute kidney injury, improved  Creatinine 1.78, received 1 L of IVF and improved down to 1.65 this morning     Hypertension  Vital signs every 4 hours  Hold spironolactone for JANET otherwise continue home medications     VTE Prophylaxis:  Mechanical VTE prophylaxis orders are present      This note will serve as a progress note    MAIK Whittaker 03/11/25 06:20 EDT    I have worn appropriate PPE during this patient encounter, sanitized my hands both with entering and exiting patient's room.      30 minutes has been spent by [facility] providers in the care of this patient while under observation status            Electronically signed by Diann Bar APRN at 03/11/25 0295       Wil[place]to Rowan MD at 03/10/25 0981          This is a gentleman that was admitted yesterday for new onset seizure.  This is a 40-year-old male who in November 2023 had a cerebral hemorrhage on the right with craniectomy.  Approximately 1 year later had a cranioplasty.  He is nonambulatory at baseline he has left-sided weakness at baseline.  Has chronic cognitive impairment.  He had a new onset seizure yesterday that was prolonged lasted up to 10 minutes.  He was admitted for neurologic evaluation for new onset seizure.  Seeing him this morning with his parents at bedside he is acting at baseline.  He is arousable he is able to tell me his name.  He denies any complaint at this time.  He is had no events throughout the night and has been seizure-free.  On exam vital signs are unremarkable.  He is a male that looks older than his stated age he is lying in bed.  He is morbidly obese.  On palpation to his scalp to the right posterior lateral aspect I do not feel any obvious bulging or any drainage.  There is no  erythema or discharge.  His heart is regular rate and rhythm lungs are clear to auscultation with no respiratory distress abdomen obese soft nontender  Neurologically he is got left hemiparesis with some chronic cognitive impairment.  Assessment and plan.  New onset seizure.  Neurology has seen and evaluated him and we have reviewed their note.  We started the patient on IV Keppra.  Patient's lactic acid yesterday was 7.3 it is gone down to 1.2 very likely from seizure.  Patient has some renal insufficiency with a creatinine of 1.65 which is fairly chronic.  His CT of the head and MRI has been done.  He has chronic significant encephalomalacia as well as postsurgical changes and a 4 x 1.5 cm subcutaneous CSF collection.  No obvious acute hemorrhage.  His MRI showed no acute hemorrhage or infarct and chronic changes.  Patient's creatinine kinase went from 800-8000.  Very likely related to the seizures.  Will sukumar continue with IV fluids and continue to monitor creatinine.  Urinalysis yesterday showed a little bit of protein and small amount of blood.  Neurosurgery has been consulted to weigh in on the fluid collection and neurology is following.  I anticipate he is going to need to be here for at least 1 more day until we see some improvement of the CK.  Talk with the patient as well as the family which would be the parents at bedside.  All questions answered at this time.    Electronically signed by Wilberto Rowna MD at 03/10/25 0939       Emilia Marino PA at 03/10/25 0900       Attestation signed by Ginette Treviño MD at 25 0731    I have reviewed this documentation and agree.                  DOS: 3/10/2025  NAME: John Cooper JrGalen   : 1985  PCP: Nancy Mendez APRN  Chief Complaint   Patient presents with    Seizures       Chief complaint: seizure  Subjective: Family at bedside.  No further seizure events.  Tolerating keppra.  CK >8k    Objective:  Vital signs: /92 (BP Location:  Right arm, Patient Position: Sitting)   Pulse 83   Temp 98 °F (36.7 °C) (Oral)   Resp 18   SpO2 97%      Gen: NAD, vitals reviewed  MS: oriented x3, recent/remote memory intact, normal attention/concentration, language intact, no neglect.  CN: visual acuity grossly normal, PERRL, EOMI, L facial , + dysarthria  Motor: 4/5 LUE delt, biceps, 2/5 distal LUE, 4/5 LLE, 5/5 on R  Sensory: intact to light touch all 4 ext.  Reflexes: +4 L patella, +3 L biceps and brachioradialis    ROS:  No weakness, numbness  No fevers, chills      Laboratory results:  Lab Results   Component Value Date    GLUCOSE 101 (H) 03/10/2025    CALCIUM 8.5 (L) 03/10/2025     03/10/2025    K 4.5 03/10/2025    CO2 22.0 03/10/2025     (H) 03/10/2025    BUN 17 03/10/2025    CREATININE 1.65 (H) 03/10/2025    BCR 10.3 03/10/2025    ANIONGAP 10.0 03/10/2025     Lab Results   Component Value Date    WBC 5.09 03/10/2025    HGB 11.7 (L) 03/10/2025    HCT 35.4 (L) 03/10/2025    MCV 85.7 03/10/2025     03/10/2025     Lab Results   Component Value Date     (H) 04/27/2023            Review of labs: CK 8700    Review and interpretation of imaging:   Head CT with no hemorrhage or acute finding there is right frontal temporal and right basal ganglia encephalomalacia with ex vacuo dilation of right lateral ventricle right calvarial craniotomy and right pterional craniectomy 4 by 1.5 cm CSF attenuation over pterional crani site    MRI brain w and w/o reviewed with pt and family-right sided wallerian degeneration, scattered microhemorrhages, encephalomalacia of right basal ganglia to temporal lobe pachymeningeal thickening reactive from surgery    Workup to date:    Diagnoses:  Post stroke epilepsy  H/o R BG ICH  Rhabdomyolysis  HTN    Impression: 40-year-old AAM with past medical history of hypertension, hyperlipidemia, nonischemic cardiomyopathy, heart failure, reduced ejection fraction, FAUSTINO, prior basal ganglia ICH with residual left  hemiparesis.  He comes to the emergency room over the weekend with new onset seizure.  Head CT showed postoperative changes no acute finding.  There was concern for CSF attenuation over the prior crani site.  Neurology was consulted for seizures and was started on Keppra.  He is tolerating well with no further events.  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  Neurosurgery consulted for abnormal imaging and no further inpatient recommendations from their standpoint.  Other things include JANET with elevated CK    Will send electronic message to f/u in clinic with epileptologist.  Seizure precautions and first-aid were described    No further neuro inpt recommendations      Thank you for this consultation.  Discussed above plan with neuro attending, Dr. Treviño who agrees with above plan.  Neurology team is available for concerns or questions.      Electronically signed by Ginette Treviño MD at 03/11/25 0731       Diann Bar APRN at 03/10/25 0439          ED OBSERVATION PROGRESS/DISCHARGE SUMMARY    Date of Admission: 3/9/2025   LOS: 0 days   PCP: Nancy Mendez APRN      Hospital Outcome:     John Cooper Jr. is a 40 y.o. male presents status post witnessed tonic-clonic seizure lasting 7 to 10 minutes and required BVM support when EMS arrived.  He had 1 prior seizure-like episode during recent hospitalization for intracranial bleed and cranioplasty.  His EEG during this hospitalization was abnormal due to mild to moderate diffuse background slowing more focal right hemisphere and inner ear canal discharge.  CT head MRI brain without acute findings, there is right sided encephalomalacia consistent with recent infarct or hemorrhage as well as wallerian degeneration.  Lactic acid was initially 7.3, improved to 1.2 after IV hydration.  Initial CK was 806 and this morning went up to 8797 therefore patient received 1 L of NS and was started on NS at 150 mL/hr. surgery evaluated patient and recommends that he  follows up with [facility]surgery at [facility]  Neurology also reevaluated patient and plans for outpatient follow-up but and has since signed off.  We will repeat CK in a.m. and anticipate that he will be discharged home if it is trending downward.        Review of Systems:   Constitutional:  No weight changes, fever, or chills. No night sweats, no fatigue, no malaise.    Cardiovascular:  No chest pain, no palpitations, no edema.      Respiratory:  No cough, no smoke exposure, no dyspnea, no orthopnea.   Gastrointestinal:  No nausea, vomiting, or diarrhea. No constipation, or GI discomfort. No reflux pain, no anorexia, no dysphagia. No hematochezia or melena.    Neuro:  No dizziness, no headache.     Objective   Physical Exam:   Constitutional: Awake, alert. Well developed for age. Nontoxic appearing.   Eyes: PERRL x2, sclerae anicteric, no conjunctival injection. No EOM abnormalities noted.   HENT: NCAT, mucous membranes moist,   Neck: Supple, no thyromegaly, no lymphadenopathy, trachea midline  Respiratory: Clear to auscultation bilaterally, nonlabored respirations   Cardiovascular: RRR, no murmurs, rubs, or gallops, palpable pedal pulses bilaterally. No appreciable edema.   Gastrointestinal: Positive bowel sounds, soft, nontender, not distended.   Musculoskeletal: No bilateral ankle edema, no clubbing or cyanosis to extremities. No obvious deformities.   Psychiatric: Appropriate affect, cooperative. Converses appropriately for age.   Neurologic: Oriented x 3, strength symmetric in all extremities. Cranial nerves grossly intact to confrontation, speech clear  Skin: No rashes, skin intact.     Results Review:    I have reviewed the labs, radiology results and diagnostic studies.    Results from last 7 days   Lab Units 03/09/25  0910   WBC 10*3/mm3 5.03   HEMOGLOBIN g/dL 12.7*   HEMATOCRIT % 38.5   PLATELETS 10*3/mm3 217     Results from last 7 days   Lab Units 03/09/25  0910   SODIUM mmol/L 138   POTASSIUM mmol/L 4.8    CHLORIDE mmol/L 101   CO2 mmol/L 19.0*   BUN mg/dL 21*   CREATININE mg/dL 1.78*   CALCIUM mg/dL 9.2   BILIRUBIN mg/dL 0.2   ALK PHOS U/L 96   ALT (SGPT) U/L 17   AST (SGOT) U/L 21   GLUCOSE mg/dL 156*     Imaging Results (Last 24 Hours)       Procedure Component Value Units Date/Time    MRI Brain With & Without Contrast [[id number]] Collected: 03/10/25 0230     Updated: 03/10/25 0230    Narrative:        Patient: BUD, SILVIA  Time Out: 02:29  Exam(s): MRI HEAD W WO Contrast     EXAM:    MR Head Without and With Intravenous Contrast    CLINICAL HISTORY:     Reason for exam: new onset seizure.    TECHNIQUE:    Magnetic resonance images of the head brain without and with   intravenous contrast in multiple planes.    COMPARISON:  3 9 2025    FINDINGS:    Brain:  No mass.  No acute hemorrhage.  No restricted diffusion.  Right-  sided encephalomalacia.  Right sided wallerian degeneration.  Scattered   chronic microhemorrhages.  Right-sided pachymeningeal thickening, likely   reactive from prior surgery.    Ventricles:  No hydrocephalus.    Bones joints: Postop changes.    Sinuses:  No acute sinusitis.    Mastoid air cells:  No effusion.    Orbits:  Unremarkable.    IMPRESSION:         No acute infarct, hemorrhage, or hydrocephalus.      Impression:          Electronically signed by Sarah Ramon MD on 03-10-25 at 0229    CT Outside Films [[id number]] Resulted: 03/09/25 1145     Updated: 03/09/25 1145    Narrative:      This procedure was auto-finalized with no dictation required.    CT Outside Films [[id number]] Resulted: 03/09/25 1145     Updated: 03/09/25 1145    Narrative:      This procedure was auto-finalized with no dictation required.    CT Outside Films [[id number]] Resulted: 03/09/25 1145     Updated: 03/09/25 1145    Narrative:      This procedure was auto-finalized with no dictation required.    CT Head Without Contrast [[id number]] Collected: 03/09/25 1110     Updated: 03/09/25 1124    Narrative:      CT HEAD  WO CONTRAST-     DATE OF EXAM: 3/9/2025 10:34 AM     INDICATION: Seizure. Head trauma.     COMPARISON: None available. Correlation is made with the reports from  the prior head CTs performed at an outside facility on 2/21/2024 and  1/23/2024 with the imaging not currently available for direct  comparison.     TECHNIQUE: Multiple contiguous axial images were acquired through the  head without the intravenous administration of contrast. Reformatted  coronal and sagittal sequences were also reviewed. Radiation dose  reduction techniques were utilized, including automated exposure control  and exposure modulation based on body size.     FINDINGS:  No acute intracranial hemorrhage or mass/mass effect. Postoperative  changes from right calvarial craniotomy and pterional craniectomy with a  4 cm x 1.5 cm subcutaneous/subgaleal CSF attenuation collection  overlying the pterional craniectomy consistent with a pseudomeningocele.  Large region of encephalomalacia in the right frontal and temporal lobes  and the right basal ganglia, likely from old infarct or hemorrhage.  Associated ex vacuo dilatation of the right lateral ventricle. No  hydrocephalus. Minimal 6 mm left to right midline shift. The basilar  cisterns are patent. Gray-white matter differentiation is otherwise  grossly maintained. Limited imaging of the orbits, paranasal sinuses,  and mastoid air cells is unremarkable. No acute osseous abnormality is  identified.       Impression:         1. No acute intracranial hemorrhage or mass/mass effect.  2. Right frontal temporal and right basal ganglia encephalomalacia,  likely from old infarct or hemorrhage, with associated ex vacuo  dilatation of the right lateral ventricle and mild left to right midline  shift.  3. Postoperative changes from right calvarial craniotomy and right  pterional craniectomy with a 4 cm x 1.5 cm subcutaneous/subgaleal CSF  attenuation collection overlying the pterional craniectomy,  consistent  with a pseudomeningocele. Recommend comparison to prior imaging if  available     This report was finalized on 3/9/2025 11:21 AM by Mino Estrada MD on  Workstation: [id number]       XR Hip With or Without Pelvis 2 - 3 View Left [[id number]] Collected: 03/09/25 1025     Updated: 03/09/25 1030    Narrative:      XR HIP W OR WO PELVIS 2-3 VIEW LEFT-     INDICATIONS: Pain     TECHNIQUE: 5 VIEWS INCLUDING THE PELVIS AND LEFT HIP     COMPARISON: None available     FINDINGS:     No acute fracture, erosion, or dislocation is identified. Left hip joint  space appears preserved. Mild right hip joint space narrowing is  apparent. Nonspecific elongated sclerotic focus is apparent in the right  intertrochanteric region. Follow-up/further evaluation can be obtained  as indications persist.       Impression:         As described.           This report was finalized on 3/9/2025 10:27 AM by Dr. Maco Wright M.D on Workstation: [id number]               I have reviewed the medications.     Discharge Medications        ASK your doctor about these medications        Instructions Start Date   acetaminophen 325 MG tablet  Commonly known as: TYLENOL   650 mg, Every 6 Hours PRN      amLODIPine 5 MG tablet  Commonly known as: NORVASC   5 mg, Oral, Nightly      carvedilol 12.5 MG tablet  Commonly known as: COREG   12.5 mg, Oral, 2 Times Daily With Meals      doxazosin 8 MG tablet  Commonly known as: CARDURA   8 mg, Oral, 2 Times Daily      Entresto 24-26 MG tablet  Generic drug: sacubitril-valsartan   1 tablet, Oral, 2 Times Daily      gabapentin 300 MG capsule  Commonly known as: NEURONTIN   100 mg, Nightly      spironolactone 25 MG tablet  Commonly known as: ALDACTONE   25 mg, Oral, Daily      tiZANidine 4 MG tablet  Commonly known as: ZANAFLEX   4 mg, Oral, 3 Times Daily      traZODone 50 MG tablet  Commonly known as: DESYREL    mg, Oral, Nightly      vitamin C 250 MG tablet  Commonly known as: ASCORBIC ACID   250  mg, Oral, Daily, Take with iron              ---------------------------------------------------------------------------------------------  Assessment & Plan   Assessment/Problem List    New onset seizure      Plan:    New onset seizure  Ct head shows postoperative findings from remote craniotomy but no acute findings  MRI no evidence right-sided encephalomalacia and malarian changes.  Scattered chronic microhemorrhages as well as patchy meningeal thickening.  Neurology following, loaded Keppra and okay to discharge with follow-up once seizure-free for 24 hours  Consult to neurosurgery for right scalp fluid collection.  Seizure precautions  Neuro checks every 4 hours     Acute kidney injury  Creatinine 1.78, received 1 L of IVF and improved down to 1.65 this morning     Hypertension  Vital signs every 4 hours  Hold spironolactone for JANET otherwise continue home medications     VTE Prophylaxis:  Mechanical VTE prophylaxis orders are present      This note will serve as a progress note    MINESH Johnson 03/10/25 04:39 EDT    I have worn appropriate PPE during this patient encounter, sanitized my hands both with entering and exiting patient's room.      30 minutes has been spent by [facility] providers in the care of this patient while under observation status        Electronically signed by Diann Bar APRN at 03/10/25 1859          Consult Notes (last 72 hours)        Mel Lopez MD at 25 1704        Consult Orders    1. Inpatient Hospitalist Consult [[id number]] ordered by Diann Bar APRN at 25 1351                 Internal Medicine Consult  INTERNAL MEDICINE   [facility]       Patient Identification:  Name: John Cooper Jr.  Age: 40 y.o.  Sex: male  :  1985  MRN: [id number]                   Primary Care Physician: Nancy Mendez APRN                               Requesting physician: Nahid Bar APRN  Date of  consultation: 2/11/2025    Reason for consultation: Continued need for inpatient hospitalization for rhabdomyolysis management and persistently elevated CPK    History of Present Illness:   Patient is a 40-year-old male who has past medical history remarkable for hemorrhagic stroke requiring craniotomy and evacuation of hematoma and cranioplasty with residual left-sided hemiplegia, history of hypertension dyslipidemia nonischemic cardiomyopathy and history of heart failure with reduced ejection fraction and sleep apnea presented to the hospital on 3/9/2025 for further treatment of seizure of new onset.  Patient was admitted to observation unit and seen by neurology service and neurosurgery service.  Patient was noted to have elevated glucose of 156 normal white blood cell count creatinine of 1.78 and CPK level of 806 with lactate of 7.3.  Patient received IV fluids with close watch of volume overload given her history of cardiomyopathy and congestive heart failure but his CPK continues to decline with subsequent CPK level going in the range of 8797 and today 8786.  Because of that nephrology service was consulted and they recommended hydration and close monitoring of CPK level and inpatient admission.  Internal medicine service consulted for continued stay in the hospital as inpatient.  Patient at present denies any new complaints.  According to his mother at the bedside he is at his baseline self.      Past Medical History:  Past Medical History:   Diagnosis Date    Allergic     Stroke      Past Surgical History:  Past Surgical History:   Procedure Laterality Date    ANKLE SURGERY Right 2015    MANDIBLE SURGERY Right 2019      Home Meds:  Medications Prior to Admission   Medication Sig Dispense Refill Last Dose/Taking    acetaminophen (TYLENOL) 325 MG tablet Take 2 tablets by mouth Every 6 (Six) Hours As Needed for Mild Pain.   Past Month    amLODIPine (NORVASC) 5 MG tablet Take 1 tablet by mouth Every Night. 90  tablet 3 3/8/2025    carvedilol (COREG) 12.5 MG tablet Take 1 tablet by mouth 2 (Two) Times a Day With Meals. 180 tablet 1 3/8/2025 at  8:00 PM    doxazosin (CARDURA) 8 MG tablet Take 1 tablet by mouth 2 (Two) Times a Day. 180 tablet 1 3/8/2025    DULoxetine HCl 40 MG capsule delayed-release particles Take 1 capsule by mouth Daily.   Taking    gabapentin (NEURONTIN) 300 MG capsule Take 100 mg by mouth Every Night.   3/8/2025    sacubitril-valsartan (Entresto) 24-26 MG tablet Take 1 tablet by mouth 2 (Two) Times a Day. 180 tablet 3 3/8/2025    spironolactone (ALDACTONE) 25 MG tablet Take 1 tablet by mouth Daily. 90 tablet 1 3/9/2025 at 11:30 AM    tiZANidine (ZANAFLEX) 4 MG tablet Take 1 tablet by mouth 3 (Three) Times a Day. 180 tablet 1 3/8/2025 at  8:30 PM    traZODone (DESYREL) 50 MG tablet Take 1-2 tablets by mouth Every Night. 180 tablet 1 3/8/2025    vitamin C (ASCORBIC ACID) 250 MG tablet Take 1 tablet by mouth Daily. Take with iron 90 tablet 0 3/8/2025 at  2:00 PM     Current Meds:     Current Facility-Administered Medications:     acetaminophen (TYLENOL) tablet 650 mg, 650 mg, Oral, Q6H PRN, Hitesh, Amanda, APRN, 650 mg at 03/09/25 1937    amLODIPine (NORVASC) tablet 5 mg, 5 mg, Oral, Nightly, Amanda Proctor, APRN, 5 mg at 03/10/25 2118    [START ON 3/12/2025] ascorbic acid (VITAMIN C) tablet 250 mg, 250 mg, Oral, Daily, Stefany Lopez MD    baclofen (LIORESAL) tablet 20 mg, 20 mg, Oral, TID, Hitesh, Amanda, APRN, 20 mg at 03/11/25 1505    sennosides-docusate (PERICOLACE) 8.6-50 MG per tablet 2 tablet, 2 tablet, Oral, BID PRN **AND** polyethylene glycol (MIRALAX) packet 17 g, 17 g, Oral, Daily PRN **AND** bisacodyl (DULCOLAX) EC tablet 5 mg, 5 mg, Oral, Daily PRN **AND** bisacodyl (DULCOLAX) suppository 10 mg, 10 mg, Rectal, Daily PRN, Amanda Proctor APRN    carvedilol (COREG) tablet 12.5 mg, 12.5 mg, Oral, BID With Meals, Amanda Proctor APRN, 12.5 mg at 03/11/25 0824    DULoxetine (CYMBALTA)  capsule 40  mg, 40 mg, Oral, Daily, Qadah, Abdalhilariokim, APRN, 40 mg at 03/11/25 1230    gabapentin (NEURONTIN) capsule 100 mg, 100 mg, Oral, Nightly, Hitesh, Amanda, APRN, 100 mg at 03/10/25 2119    levETIRAcetam (KEPPRA) injection 500 mg, 500 mg, Intravenous, Q12H, Robi aVsquez MD, 500 mg at 03/11/25 0824    ondansetron ODT (ZOFRAN-ODT) disintegrating tablet 4 mg, 4 mg, Oral, Q6H PRN **OR** ondansetron (ZOFRAN) injection 4 mg, 4 mg, Intravenous, Q6H PRN, Herth, Amanda, APRN, 4 mg at 03/11/25 0445    Insert Peripheral IV, , , Once **AND** sodium chloride 0.9 % flush 10 mL, 10 mL, Intravenous, PRN, Herth, Amanda, APRN    sodium chloride 0.9 % flush 10 mL, 10 mL, Intravenous, Q12H, Herth, Amanda, APRN, 10 mL at 03/11/25 0826    sodium chloride 0.9 % flush 10 mL, 10 mL, Intravenous, PRN, Herth, Amanda, APRN    sodium chloride 0.9 % infusion 40 mL, 40 mL, Intravenous, PRN, Herth, Amanda, APRN    sodium chloride 0.9 % infusion, 150 mL/hr, Intravenous, Continuous, Greg Garrido, APRN, Last Rate: 150 mL/hr at 03/11/25 1603, 150 mL/hr at 03/11/25 1603    [Held by provider] spironolactone (ALDACTONE) tablet 25 mg, 25 mg, Oral, Daily, Herth, Amanda, APRN    terazosin (HYTRIN) capsule 5 mg, 5 mg, Oral, Q12H, Herth, Amanda, APRN, 5 mg at 03/11/25 0825    tiZANidine (ZANAFLEX) tablet 4 mg, 4 mg, Oral, TID, Herth, Amanda, APRN, 4 mg at 03/11/25 1505    traZODone (DESYREL) tablet 50 mg, 50 mg, Oral, Nightly, Amanda Proctor APRN, 50 mg at 03/10/25 8292  Allergies:  Allergies   Allergen Reactions    Banana Unknown - High Severity     no problems with anesthesia    Latex Angioedema    Other Swelling     BANANAS, STATES LIPS BEGAN TO SWELL AND TINGLE     Social History:   Social History     Tobacco Use    Smoking status: Never     Passive exposure: Never    Smokeless tobacco: Never    Tobacco comments:     Social cigar smoker   Substance Use Topics    Alcohol use: Yes     Comment: social      Family History:  Family History   Problem Relation Age  of Onset    Hypertension Father     Bone cancer Brother     Colon cancer Maternal Grandmother           Review of Systems  See history of present illness and past medical history.        Vitals:   /76 (BP Location: Right arm, Patient Position: Lying)   Pulse 71   Temp 98.3 °F (36.8 °C) (Oral)   Resp 19   SpO2 95%   I/O:   Intake/Output Summary (Last 24 hours) at 3/11/2025 1705  Last data filed at 3/11/2025 0824  Gross per 24 hour   Intake 1657.2 ml   Output 2000 ml   Net -342.8 ml     Exam:  Patient is examined using the personal protective equipment as per guidelines from infection control for this particular patient as enacted.  Hand washing was performed before and after patient interaction.  General Appearance:    Alert, cooperative, no distress, appears stated age   Head:    Normocephalic, without obvious abnormality, atraumatic   Eyes:    PERRL, conjunctiva/corneas clear, EOM's intact, both eyes   Ears:    Normal external ear canals, both ears   Nose:   Nares normal, septum midline, mucosa normal, no drainage    or sinus tenderness   Throat:   Lips, tongue, gums normal; oral mucosa pink and moist   Neck:   Supple, symmetrical, trachea midline, no adenopathy;     thyroid:  no enlargement/tenderness/nodules; no carotid    bruit or JVD   Back:     Symmetric, no curvature, ROM normal, no CVA tenderness   Lungs:     Clear to auscultation bilaterally, respirations unlabored   Chest Wall:    No tenderness or deformity    Heart:  S1-S2 regular   Abdomen:   Soft nontender   Extremities:   Extremities normal, atraumatic, no cyanosis or edema   Pulses:   Pulses palpable in all extremities; symmetric all extremities   Skin:   Skin color normal, Skin is warm and dry,  no rashes or palpable lesions   Neurologic: Residual left hemiparesis, awake and oriented x 3       Data Review:      I reviewed the patient's new clinical results.  Results from last 7 days   Lab Units 03/11/25  0547 03/10/25  0530 03/09/25  0911    WBC 10*3/mm3 5.03 5.09 5.03   HEMOGLOBIN g/dL 12.2* 11.7* 12.7*   PLATELETS 10*3/mm3 191 212 217     Results from last 7 days   Lab Units 03/11/25  0547 03/10/25  0530 03/09/25  0910   SODIUM mmol/L 142 140 138   POTASSIUM mmol/L 4.0 4.5 4.8   CHLORIDE mmol/L 108* 108* 101   CO2 mmol/L 21.9* 22.0 19.0*   BUN mg/dL 12 17 21*   CREATININE mg/dL 1.35* 1.65* 1.78*   CALCIUM mg/dL 8.3* 8.5* 9.2   GLUCOSE mg/dL 96 101* 156*     CPK          3/11/2025    00:00 3/11/2025    05:47 3/11/2025    12:18   Common Labs   Creatine Kinase 8,652  8,786  8,885        Assessment:  Active Hospital Problems    Diagnosis  POA    **New onset seizure [R56.9]  Yes    Rhabdomyolysis [M62.82]  Yes   Acute kidney injury  History of hemorrhagic CVA with residual left-sided hemiparesis  History of craniotomy and cranioplasty and evacuation of hematoma  History of cardiomyopathy and congestive heart failure due to reduced ejection fraction  Plan:  To continue his hospital stay as inpatient under [facility]  Continue with IV fluids close monitoring for volume overload in the setting of prior history of cardiomyopathy  Monitor serial CPK  Avoid myotoxic agent and statins  Continue with physical therapy  Follow neurology and nephrology advice going forward  Seizure precautions    Mel Lopez MD   3/11/2025  17:05 EDT    Parts of this note may be an electronic transcription/translation of spoken language to printed text using the Dragon dictation system.      Electronically signed by Mel Lopez MD at 03/12/25 0422       Stefany Lopez MD at 03/11/25 1547        Consult Orders    1. Inpatient Nephrology Consult [[id number]] ordered by Diann Bar APRN at 03/11/25 1522                   Referring Provider: Dr. Lechuga.  Reason for Consultation: JANET, rhabdomyolysis.    Subjective     Chief complaint   Chief Complaint   Patient presents with    Seizures       History of present illness: 40-year-old -American male status post right  Basal  ganglia hemorrhage requiring craniectomy and cranioplasty in 2023.  Subsequent left side hemiplegia with spasticity.  On multiple muscle relaxers at home.  Has recently been weaning off of gabapentin and has increased baclofen from 10 mg 3 times daily to 20 mg 3 times daily.  Admitted 3/9/2025 after seizure at home.  The only new medication was an extra dose of trazodone for sleep the night prior to the seizure.  His CK on admission was 806.  It subsequently lorin to 8700 and has really stayed in that range the past 3 days.  He is on IV fluid normal saline 150 cc/h with excellent urine output.  His initial creatinine was 1.78 but it has improved to 1.35 today.  Blood pressure initially very hypertensive but has improved.  He is on Entresto and spironolactone at home.  He was started on Keppra here in the hospital.  Neurology has been following him.  He has some left leg pain and left shoulder sensitivity that is chronic.  Eating fair  Urine clear yellow  Bowels loose at home but improved after stopping oral iron.    Past Medical History:   Diagnosis Date    Allergic     Stroke      Past Surgical History:   Procedure Laterality Date    ANKLE SURGERY Right 2015    MANDIBLE SURGERY Right 2019     Family History   Problem Relation Age of Onset    Hypertension Father     Bone cancer Brother     Colon cancer Maternal Grandmother      Lives with his parents.  Social History     Tobacco Use    Smoking status: Never     Passive exposure: Never    Smokeless tobacco: Never    Tobacco comments:     Social cigar smoker   Vaping Use    Vaping status: Never Used   Substance Use Topics    Alcohol use: Yes     Comment: social    Drug use: Not Currently     Medications Prior to Admission   Medication Sig Dispense Refill Last Dose/Taking    acetaminophen (TYLENOL) 325 MG tablet Take 2 tablets by mouth Every 6 (Six) Hours As Needed for Mild Pain.   Past Month    amLODIPine (NORVASC) 5 MG tablet Take 1 tablet by mouth Every Night. 90  tablet 3 3/8/2025    carvedilol (COREG) 12.5 MG tablet Take 1 tablet by mouth 2 (Two) Times a Day With Meals. 180 tablet 1 3/8/2025 at  8:00 PM    doxazosin (CARDURA) 8 MG tablet Take 1 tablet by mouth 2 (Two) Times a Day. 180 tablet 1 3/8/2025    DULoxetine HCl 40 MG capsule delayed-release particles Take 1 capsule by mouth Daily.   Taking    gabapentin (NEURONTIN) 300 MG capsule Take 100 mg by mouth Every Night.   3/8/2025    sacubitril-valsartan (Entresto) 24-26 MG tablet Take 1 tablet by mouth 2 (Two) Times a Day. 180 tablet 3 3/8/2025    spironolactone (ALDACTONE) 25 MG tablet Take 1 tablet by mouth Daily. 90 tablet 1 3/9/2025 at 11:30 AM    tiZANidine (ZANAFLEX) 4 MG tablet Take 1 tablet by mouth 3 (Three) Times a Day. 180 tablet 1 3/8/2025 at  8:30 PM    traZODone (DESYREL) 50 MG tablet Take 1-2 tablets by mouth Every Night. 180 tablet 1 3/8/2025    vitamin C (ASCORBIC ACID) 250 MG tablet Take 1 tablet by mouth Daily. Take with iron 90 tablet 0 3/8/2025 at  2:00 PM     Allergies:  Banana, Latex, and Other    Review of Systems  14 points review of system was performed and it was negative other than what noted above in the HPI    Objective     Vital Signs  Temp:  [97.9 °F (36.6 °C)-98.8 °F (37.1 °C)] 98.6 °F (37 °C)  Heart Rate:  [71-84] 71  Resp:  [18] 18  BP: (114-126)/(70-88) 126/88         I/O this shift:  In: 240 [P.O.:240]  Out: 900 [Urine:900]  I/O last 3 completed shifts:  In: 1417.2 [I.V.:1417.2]  Out: 2300 [Urine:2300]    Intake/Output Summary (Last 24 hours) at 3/11/2025 1543  Last data filed at 3/11/2025 0824  Gross per 24 hour   Intake 1657.2 ml   Output 2000 ml   Net -342.8 ml       Physical Exam:  General Appearance: alert, oriented x 3, no acute distress, speech a little dysarthric at times.  Skin: warm and dry  HEENT: pupils round and reactive to light, oral mucosa normal, nonicteric sclera  Neck: supple, no JVD, trachea midline  Lungs: Clear to auscultation bilaterally.  Unlabored lying  flat.  Heart: RRR, normal S1 and S2, no S3, no rub  Abdomen: soft, nontender, normoactive bowels  : no palpable bladder, external catheter.  Extremities: Minimal peripheral edema.  No muscle tightness or evidence of compartment syndrome upper and lower extremities.  Neuro: Spastic hemiplegia on the left.    Results Review:  Results for orders placed or performed during the hospital encounter of 03/09/25   Comprehensive Metabolic Panel    Collection Time: 03/09/25  9:10 AM    Specimen: Blood   Result Value Ref Range    Glucose 156 (H) 65 - 99 mg/dL    BUN 21 (H) 6 - 20 mg/dL    Creatinine 1.78 (H) 0.76 - 1.27 mg/dL    Sodium 138 136 - 145 mmol/L    Potassium 4.8 3.5 - 5.2 mmol/L    Chloride 101 98 - 107 mmol/L    CO2 19.0 (L) 22.0 - 29.0 mmol/L    Calcium 9.2 8.6 - 10.5 mg/dL    Total Protein 7.7 6.0 - 8.5 g/dL    Albumin 4.3 3.5 - 5.2 g/dL    ALT (SGPT) 17 1 - 41 U/L    AST (SGOT) 21 1 - 40 U/L    Alkaline Phosphatase 96 39 - 117 U/L    Total Bilirubin 0.2 0.0 - 1.2 mg/dL    Globulin 3.4 gm/dL    A/G Ratio 1.3 g/dL    BUN/Creatinine Ratio 11.8 7.0 - 25.0    Anion Gap 18.0 (H) 5.0 - 15.0 mmol/L    eGFR 48.5 (L) >60.0 mL/min/1.73   Ethanol    Collection Time: 03/09/25  9:10 AM    Specimen: Blood   Result Value Ref Range    Ethanol <10 0 - 10 mg/dL    Ethanol % <0.010 %   CBC Auto Differential    Collection Time: 03/09/25  9:10 AM    Specimen: Blood   Result Value Ref Range    WBC 5.03 3.40 - 10.80 10*3/mm3    RBC 4.46 4.14 - 5.80 10*6/mm3    Hemoglobin 12.7 (L) 13.0 - 17.7 g/dL    Hematocrit 38.5 37.5 - 51.0 %    MCV 86.3 79.0 - 97.0 fL    MCH 28.5 26.6 - 33.0 pg    MCHC 33.0 31.5 - 35.7 g/dL    RDW 13.4 12.3 - 15.4 %    RDW-SD 42.1 37.0 - 54.0 fl    MPV 12.1 (H) 6.0 - 12.0 fL    Platelets 217 140 - 450 10*3/mm3    Neutrophil % 64.2 42.7 - 76.0 %    Lymphocyte % 28.0 19.6 - 45.3 %    Monocyte % 3.6 (L) 5.0 - 12.0 %    Eosinophil % 2.6 0.3 - 6.2 %    Basophil % 0.6 0.0 - 1.5 %    Immature Grans % 1.0 (H) 0.0 - 0.5 %     Neutrophils, Absolute 3.23 1.70 - 7.00 10*3/mm3    Lymphocytes, Absolute 1.41 0.70 - 3.10 10*3/mm3    Monocytes, Absolute 0.18 0.10 - 0.90 10*3/mm3    Eosinophils, Absolute 0.13 0.00 - 0.40 10*3/mm3    Basophils, Absolute 0.03 0.00 - 0.20 10*3/mm3    Immature Grans, Absolute 0.05 0.00 - 0.05 10*3/mm3    nRBC 0.0 0.0 - 0.2 /100 WBC   Lactic Acid, Plasma    Collection Time: 03/09/25  9:10 AM    Specimen: Blood   Result Value Ref Range    Lactate 7.3 (C) 0.5 - 2.0 mmol/L   CK    Collection Time: 03/09/25  9:10 AM    Specimen: Blood   Result Value Ref Range    Creatine Kinase 806 (H) 20 - 200 U/L   Urinalysis With Microscopic If Indicated (No Culture) - Urine, Clean Catch    Collection Time: 03/09/25 11:47 AM    Specimen: Urine, Clean Catch   Result Value Ref Range    Color, UA Yellow Yellow, Straw    Appearance, UA Clear Clear    pH, UA 5.5 5.0 - 8.0    Specific Gravity, UA 1.017 1.005 - 1.030    Glucose, UA Negative Negative    Ketones, UA Negative Negative    Bilirubin, UA Negative Negative    Blood, UA Small (1+) (A) Negative    Protein, UA 30 mg/dL (1+) (A) Negative    Leuk Esterase, UA Negative Negative    Nitrite, UA Negative Negative    Urobilinogen, UA 0.2 E.U./dL 0.2 - 1.0 E.U./dL   Urine Drug Screen - Urine, Clean Catch    Collection Time: 03/09/25 11:47 AM    Specimen: Urine, Clean Catch   Result Value Ref Range    THC, Screen, Urine Negative Negative    Phencyclidine (PCP), Urine Negative Negative    Cocaine Screen, Urine Negative Negative    Methamphetamine, Ur Negative Negative    Opiate Screen Negative Negative    Amphetamine Screen, Urine Negative Negative    Benzodiazepine Screen, Urine Negative Negative    Tricyclic Antidepressants Screen Negative Negative    Methadone Screen, Urine Negative Negative    Barbiturates Screen, Urine Negative Negative    Oxycodone Screen, Urine Negative Negative    Buprenorphine, Screen, Urine Negative Negative   Fentanyl, Urine - Urine, Clean Catch    Collection Time:  03/09/25 11:47 AM    Specimen: Urine, Clean Catch   Result Value Ref Range    Fentanyl, Urine Negative Negative   Urinalysis, Microscopic Only - Urine, Clean Catch    Collection Time: 03/09/25 11:47 AM    Specimen: Urine, Clean Catch   Result Value Ref Range    RBC, UA 0-2 None Seen, 0-2 /HPF    WBC, UA 0-2 None Seen, 0-2 /HPF    Bacteria, UA None Seen None Seen /HPF    Squamous Epithelial Cells, UA 0-2 None Seen, 0-2 /HPF    Hyaline Casts, UA 0-2 None Seen /LPF    Methodology Automated Microscopy    STAT Lactic Acid, Reflex    Collection Time: 03/09/25  2:22 PM    Specimen: Arm, Right; Blood   Result Value Ref Range    Lactate 1.2 0.5 - 2.0 mmol/L   CBC Auto Differential    Collection Time: 03/10/25  5:30 AM    Specimen: Blood   Result Value Ref Range    WBC 5.09 3.40 - 10.80 10*3/mm3    RBC 4.13 (L) 4.14 - 5.80 10*6/mm3    Hemoglobin 11.7 (L) 13.0 - 17.7 g/dL    Hematocrit 35.4 (L) 37.5 - 51.0 %    MCV 85.7 79.0 - 97.0 fL    MCH 28.3 26.6 - 33.0 pg    MCHC 33.1 31.5 - 35.7 g/dL    RDW 13.5 12.3 - 15.4 %    RDW-SD 42.2 37.0 - 54.0 fl    MPV 12.2 (H) 6.0 - 12.0 fL    Platelets 212 140 - 450 10*3/mm3    Neutrophil % 56.3 42.7 - 76.0 %    Lymphocyte % 35.4 19.6 - 45.3 %    Monocyte % 5.7 5.0 - 12.0 %    Eosinophil % 2.0 0.3 - 6.2 %    Basophil % 0.4 0.0 - 1.5 %    Immature Grans % 0.2 0.0 - 0.5 %    Neutrophils, Absolute 2.87 1.70 - 7.00 10*3/mm3    Lymphocytes, Absolute 1.80 0.70 - 3.10 10*3/mm3    Monocytes, Absolute 0.29 0.10 - 0.90 10*3/mm3    Eosinophils, Absolute 0.10 0.00 - 0.40 10*3/mm3    Basophils, Absolute 0.02 0.00 - 0.20 10*3/mm3    Immature Grans, Absolute 0.01 0.00 - 0.05 10*3/mm3    nRBC 0.0 0.0 - 0.2 /100 WBC   Basic Metabolic Panel    Collection Time: 03/10/25  5:30 AM    Specimen: Blood   Result Value Ref Range    Glucose 101 (H) 65 - 99 mg/dL    BUN 17 6 - 20 mg/dL    Creatinine 1.65 (H) 0.76 - 1.27 mg/dL    Sodium 140 136 - 145 mmol/L    Potassium 4.5 3.5 - 5.2 mmol/L    Chloride 108 (H) 98 - 107  mmol/L    CO2 22.0 22.0 - 29.0 mmol/L    Calcium 8.5 (L) 8.6 - 10.5 mg/dL    BUN/Creatinine Ratio 10.3 7.0 - 25.0    Anion Gap 10.0 5.0 - 15.0 mmol/L    eGFR 53.2 (L) >60.0 mL/min/1.73   CK    Collection Time: 03/10/25  5:30 AM    Specimen: Blood   Result Value Ref Range    Creatine Kinase 8,797 (H) 20 - 200 U/L   CK    Collection Time: 03/10/25 12:02 PM    Specimen: Blood   Result Value Ref Range    Creatine Kinase 8,167 (H) 20 - 200 U/L   CK    Collection Time: 03/10/25  6:16 PM    Specimen: Blood   Result Value Ref Range    Creatine Kinase 8,977 (H) 20 - 200 U/L   CK    Collection Time: 03/11/25 12:00 AM    Specimen: Blood   Result Value Ref Range    Creatine Kinase 8,652 (H) 20 - 200 U/L   CK    Collection Time: 03/11/25  5:47 AM    Specimen: Blood   Result Value Ref Range    Creatine Kinase 8,786 (H) 20 - 200 U/L   CBC (No Diff)    Collection Time: 03/11/25  5:47 AM    Specimen: Blood   Result Value Ref Range    WBC 5.03 3.40 - 10.80 10*3/mm3    RBC 4.32 4.14 - 5.80 10*6/mm3    Hemoglobin 12.2 (L) 13.0 - 17.7 g/dL    Hematocrit 37.1 (L) 37.5 - 51.0 %    MCV 85.9 79.0 - 97.0 fL    MCH 28.2 26.6 - 33.0 pg    MCHC 32.9 31.5 - 35.7 g/dL    RDW 13.7 12.3 - 15.4 %    RDW-SD 42.9 37.0 - 54.0 fl    MPV 12.1 (H) 6.0 - 12.0 fL    Platelets 191 140 - 450 10*3/mm3   Basic Metabolic Panel    Collection Time: 03/11/25  5:47 AM    Specimen: Blood   Result Value Ref Range    Glucose 96 65 - 99 mg/dL    BUN 12 6 - 20 mg/dL    Creatinine 1.35 (H) 0.76 - 1.27 mg/dL    Sodium 142 136 - 145 mmol/L    Potassium 4.0 3.5 - 5.2 mmol/L    Chloride 108 (H) 98 - 107 mmol/L    CO2 21.9 (L) 22.0 - 29.0 mmol/L    Calcium 8.3 (L) 8.6 - 10.5 mg/dL    BUN/Creatinine Ratio 8.9 7.0 - 25.0    Anion Gap 12.1 5.0 - 15.0 mmol/L    eGFR 67.6 >60.0 mL/min/1.73   CK    Collection Time: 03/11/25 12:18 PM    Specimen: Blood   Result Value Ref Range    Creatine Kinase 8,885 (H) 20 - 200 U/L     Imaging Results (Last 72 Hours)       Procedure Component  Value Units Date/Time    MRI Brain With & Without Contrast [[id number]] Collected: 03/10/25 0230     Updated: 03/10/25 0230    Narrative:        Patient: BUD, SILVIA  Time Out: 02:29  Exam(s): MRI HEAD W WO Contrast     EXAM:    MR Head Without and With Intravenous Contrast    CLINICAL HISTORY:     Reason for exam: new onset seizure.    TECHNIQUE:    Magnetic resonance images of the head brain without and with   intravenous contrast in multiple planes.    COMPARISON:  3 9 2025    FINDINGS:    Brain:  No mass.  No acute hemorrhage.  No restricted diffusion.  Right-  sided encephalomalacia.  Right sided wallerian degeneration.  Scattered   chronic microhemorrhages.  Right-sided pachymeningeal thickening, likely   reactive from prior surgery.    Ventricles:  No hydrocephalus.    Bones joints: Postop changes.    Sinuses:  No acute sinusitis.    Mastoid air cells:  No effusion.    Orbits:  Unremarkable.    IMPRESSION:         No acute infarct, hemorrhage, or hydrocephalus.      Impression:          Electronically signed by Sarah Ramon MD on 03-10-25 at 0229    CT Outside Films [[id number]] Resulted: 03/09/25 1145     Updated: 03/09/25 1145    Narrative:      This procedure was auto-finalized with no dictation required.    CT Outside Films [[id number]] Resulted: 03/09/25 1145     Updated: 03/09/25 1145    Narrative:      This procedure was auto-finalized with no dictation required.    CT Outside Films [[id number]] Resulted: 03/09/25 1145     Updated: 03/09/25 1145    Narrative:      This procedure was auto-finalized with no dictation required.    CT Head Without Contrast [[id number]] Collected: 03/09/25 1110     Updated: 03/09/25 1124    Narrative:      CT HEAD WO CONTRAST-     DATE OF EXAM: 3/9/2025 10:34 AM     INDICATION: Seizure. Head trauma.     COMPARISON: None available. Correlation is made with the reports from  the prior head CTs performed at an outside facility on 2/21/2024 and  1/23/2024 with the imaging not  currently available for direct  comparison.     TECHNIQUE: Multiple contiguous axial images were acquired through the  head without the intravenous administration of contrast. Reformatted  coronal and sagittal sequences were also reviewed. Radiation dose  reduction techniques were utilized, including automated exposure control  and exposure modulation based on body size.     FINDINGS:  No acute intracranial hemorrhage or mass/mass effect. Postoperative  changes from right calvarial craniotomy and pterional craniectomy with a  4 cm x 1.5 cm subcutaneous/subgaleal CSF attenuation collection  overlying the pterional craniectomy consistent with a pseudomeningocele.  Large region of encephalomalacia in the right frontal and temporal lobes  and the right basal ganglia, likely from old infarct or hemorrhage.  Associated ex vacuo dilatation of the right lateral ventricle. No  hydrocephalus. Minimal 6 mm left to right midline shift. The basilar  cisterns are patent. Gray-white matter differentiation is otherwise  grossly maintained. Limited imaging of the orbits, paranasal sinuses,  and mastoid air cells is unremarkable. No acute osseous abnormality is  identified.       Impression:         1. No acute intracranial hemorrhage or mass/mass effect.  2. Right frontal temporal and right basal ganglia encephalomalacia,  likely from old infarct or hemorrhage, with associated ex vacuo  dilatation of the right lateral ventricle and mild left to right midline  shift.  3. Postoperative changes from right calvarial craniotomy and right  pterional craniectomy with a 4 cm x 1.5 cm subcutaneous/subgaleal CSF  attenuation collection overlying the pterional craniectomy, consistent  with a pseudomeningocele. Recommend comparison to prior imaging if  available     This report was finalized on 3/9/2025 11:21 AM by Mino Estrada MD on  Workstation: [id number]       XR Hip With or Without Pelvis 2 - 3 View Left [[id number]] Collected: 03/09/25  1025     Updated: 03/09/25 1030    Narrative:      XR HIP W OR WO PELVIS 2-3 VIEW LEFT-     INDICATIONS: Pain     TECHNIQUE: 5 VIEWS INCLUDING THE PELVIS AND LEFT HIP     COMPARISON: None available     FINDINGS:     No acute fracture, erosion, or dislocation is identified. Left hip joint  space appears preserved. Mild right hip joint space narrowing is  apparent. Nonspecific elongated sclerotic focus is apparent in the right  intertrochanteric region. Follow-up/further evaluation can be obtained  as indications persist.       Impression:         As described.           This report was finalized on 3/9/2025 10:27 AM by Dr. Maco Wright M.D on Workstation: [id number]                 amLODIPine, 5 mg, Oral, Nightly  vitamin C, 250 mg, Oral, Daily  baclofen, 20 mg, Oral, TID  carvedilol, 12.5 mg, Oral, BID With Meals  DULoxetine, 40 mg, Oral, Daily  gabapentin, 100 mg, Oral, Nightly  levETIRAcetam, 500 mg, Intravenous, Q12H  sodium chloride, 10 mL, Intravenous, Q12H  [Held by provider] spironolactone, 25 mg, Oral, Daily  terazosin, 5 mg, Oral, Q12H  tiZANidine, 4 mg, Oral, TID  traZODone, 50 mg, Oral, Nightly      sodium chloride, 150 mL/hr, Last Rate: 150 mL/hr (03/11/25 1233)        Assessment & Plan   JANET , likely multifactorial including rhabdomyolysis with elevated CPK after new onset seizure in the setting of sacubitril valsartan.  Urine with small blood, no red cells.  Nonoliguric.  On IV fluids at rate of 150/h.  His creatinine has improved to 1.35 from a peak of 1.78 days ago.  No evidence of compartment syndrome by exam.  Baclofen can also add to risk of seizure and has been reported to cause rhabdomyolysis.  May need to start reducing the dose.  2.  New onset seizure.  Neurology evaluation.  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  Now on Keppra.  3.  Prior intracerebral right basal ganglia hemorrhage 11/16/2023 status post craniectomy with subsequent  cranioplasty.  Left side spastic hemiplegia.  4.  Prior history of heart failure reduced ejection fraction, EF on most recent echo 1/28/2025 normal.  5.  Hypertension.  Hold sacubitril/valsartan for now until renal function returns to normal and CK is normalized.  Continue Terazosin twice daily, home Coreg and Norvasc.  Watch blood pressure with aggressive IV fluid rate.  No evidence of volume excess for now.  6. Obstructive sleep apnea    Plan:  Continue IV fluids 150 cc/h.  No IV fluid bolus needed.  2.  Stop Entresto for now.  3.  Will discuss reduction of dose of baclofen with Neurology.   4.  Continue current antihypertensives.  5.  Can check daily CK.  6.  Would admit to the hospital.  I discussed the patient's findings and my recommendations with the family.      Stefany Lopez MD  03/11/25  15:43 EDT    Please note that portions of this note were completed with a voice recognition program.        Electronically signed by Stefany Lopez MD at 03/12/25 0622       Mami Cantrell APRN at 03/10/25 0842        Consult Orders    1. Inpatient Neurosurgery Consult [[id number]] ordered by Amanda Proctor APRN at 03/09/25 1150    2. Neurosurgery (on-call MD unless specified) [[id number]] ordered by Amanda Proctor APRN at 03/09/25 1147              Attestation signed by Reese Pinedo MD at 03/10/25 1341    I have reviewed this documentation and agree.                  [facility] CONSULT NOTE    Patient name: John Cooper   Referring Provider: Amanda Mcgregor  Reason for Consultation: prior hemorrhagic cva, new onset seizure    Patient Care Team:  Nancy Mendez APRN as PCP - General (Nurse Practitioner)    Chief complaint: seizure    Subjective .     History of present illness:    Patient is a 40 y.o.  male with HTN, HLD, heart failure, and FAUSTINO who underwent craniectomy at [facility] for hemorrhagic stroke. He has since recovered and had follow-up cranioplasty performed February 2024.  He had witnessed seizure at home yesterday that lasted about 10 minutes per family. He had seizures before with his prior hemorrhage but apparently they have been well controlled since. He has since been seen by neurology who have since started him on Keppra. Elevated CK this morning, getting IVF. Neurosurgery consulted given history of craniectomy/plasty.     Review of Systems  Review of Systems   Constitutional:  Positive for fatigue.   Eyes:  Negative for photophobia and visual disturbance.   Neurological:  Positive for seizures. Negative for headaches.       History  PAST MEDICAL HISTORY  Past Medical History:   Diagnosis Date    Allergic     Stroke        PAST SURGICAL HISTORY  Past Surgical History:   Procedure Laterality Date    ANKLE SURGERY Right 2015    MANDIBLE SURGERY Right 2019       FAMILY HISTORY  Family History   Problem Relation Age of Onset    Hypertension Father     Bone cancer Brother     Colon cancer Maternal Grandmother        SOCIAL HISTORY  Social History     Tobacco Use    Smoking status: Never     Passive exposure: Never    Smokeless tobacco: Never    Tobacco comments:     Social cigar smoker   Vaping Use    Vaping status: Never Used   Substance Use Topics    Alcohol use: Yes     Comment: social    Drug use: Not Currently       Allergies:  Banana, Latex, and Other    MEDICATIONS:  Medications Prior to Admission   Medication Sig Dispense Refill Last Dose/Taking    acetaminophen (TYLENOL) 325 MG tablet Take 2 tablets by mouth Every 6 (Six) Hours As Needed for Mild Pain.   Past Month    amLODIPine (NORVASC) 5 MG tablet Take 1 tablet by mouth Every Night. 90 tablet 3 3/8/2025    carvedilol (COREG) 12.5 MG tablet Take 1 tablet by mouth 2 (Two) Times a Day With Meals. 180 tablet 1 3/8/2025 at  8:00 PM    doxazosin (CARDURA) 8 MG tablet Take 1 tablet by mouth 2 (Two) Times a Day. 180 tablet 1 3/8/2025    gabapentin (NEURONTIN) 300 MG capsule Take 100 mg by mouth Every Night.   3/8/2025     sacubitril-valsartan (Entresto) 24-26 MG tablet Take 1 tablet by mouth 2 (Two) Times a Day. 180 tablet 3 3/8/2025    spironolactone (ALDACTONE) 25 MG tablet Take 1 tablet by mouth Daily. 90 tablet 1 3/9/2025 at 11:30 AM    tiZANidine (ZANAFLEX) 4 MG tablet Take 1 tablet by mouth 3 (Three) Times a Day. 180 tablet 1 3/8/2025 at  8:30 PM    traZODone (DESYREL) 50 MG tablet Take 1-2 tablets by mouth Every Night. 180 tablet 1 3/8/2025    vitamin C (ASCORBIC ACID) 250 MG tablet Take 1 tablet by mouth Daily. Take with iron 90 tablet 0 3/8/2025 at  2:00 PM         Current Facility-Administered Medications:     acetaminophen (TYLENOL) tablet 650 mg, 650 mg, Oral, Q6H PRN, Herth, Amanda, APRN, 650 mg at 03/09/25 1937    amLODIPine (NORVASC) tablet 5 mg, 5 mg, Oral, Nightly, Herth, Amanda, APRN, 5 mg at 03/09/25 2039    ascorbic acid (VITAMIN C) tablet 250 mg, 250 mg, Oral, Daily, Herth, Amanda, APRN, 250 mg at 03/09/25 1830    baclofen (LIORESAL) tablet 20 mg, 20 mg, Oral, TID, Herth, Amanda, APRN, 20 mg at 03/10/25 0821    sennosides-docusate (PERICOLACE) 8.6-50 MG per tablet 2 tablet, 2 tablet, Oral, BID PRN **AND** polyethylene glycol (MIRALAX) packet 17 g, 17 g, Oral, Daily PRN **AND** bisacodyl (DULCOLAX) EC tablet 5 mg, 5 mg, Oral, Daily PRN **AND** bisacodyl (DULCOLAX) suppository 10 mg, 10 mg, Rectal, Daily PRN, Herth, Amanda, APRN    carvedilol (COREG) tablet 12.5 mg, 12.5 mg, Oral, BID With Meals, Herth, Amanda, APRN, 12.5 mg at 03/10/25 0821    gabapentin (NEURONTIN) capsule 100 mg, 100 mg, Oral, Nightly, Amanda Proctor APRN, 100 mg at 03/09/25 2038    levETIRAcetam (KEPPRA) injection 500 mg, 500 mg, Intravenous, Q12H, Robi Vasquez MD, 500 mg at 03/10/25 0821    ondansetron ODT (ZOFRAN-ODT) disintegrating tablet 4 mg, 4 mg, Oral, Q6H PRN **OR** ondansetron (ZOFRAN) injection 4 mg, 4 mg, Intravenous, Q6H PRN, Amanda Proctor APRN    sacubitril-valsartan (ENTRESTO) 24-26 MG tablet 1 tablet, 1 tablet, Oral, BID,  Hitesh, Amanda, APRN, 1 tablet at 03/10/25 0821    Insert Peripheral IV, , , Once **AND** sodium chloride 0.9 % flush 10 mL, 10 mL, Intravenous, PRN, Herth, Amanda, APRN    sodium chloride 0.9 % flush 10 mL, 10 mL, Intravenous, Q12H, Hernorberto, Amanda, APRN, 10 mL at 03/10/25 0821    sodium chloride 0.9 % flush 10 mL, 10 mL, Intravenous, PRN, Herth, Amanda, APRN    sodium chloride 0.9 % infusion 40 mL, 40 mL, Intravenous, PRN, Hernorberto, Amanda, APRN    sodium chloride 0.9 % infusion, 150 mL/hr, Intravenous, Continuous, GarridoGreg, APRN, Last Rate: 150 mL/hr at 03/10/25 0810, 150 mL/hr at 03/10/25 0810    [Held by provider] spironolactone (ALDACTONE) tablet 25 mg, 25 mg, Oral, Daily, HerAmanda thornton, APRN    terazosin (HYTRIN) capsule 5 mg, 5 mg, Oral, Q12H, Hernorberto, Amanda, APRN, 5 mg at 03/10/25 0821    tiZANidine (ZANAFLEX) tablet 4 mg, 4 mg, Oral, TID, [facility], Amadna, APRN, 4 mg at 03/10/25 0821    traZODone (DESYREL) tablet 50 mg, 50 mg, Oral, Nightly, [facility], Amanda, APRN, 50 mg at 03/09/25 2038    COMORBID CONDITIONS:  Craniectomy, Heart failure, History of Intracranial Hemorrhage, Hypertension, and FAUSTINO, HLD, and Seizure    Objective     Results Review:  LABS:  Results from last 7 days   Lab Units 03/10/25  0530 03/09/25  0910   WBC 10*3/mm3 5.09 5.03   HEMOGLOBIN g/dL 11.7* 12.7*   HEMATOCRIT % 35.4* 38.5   PLATELETS 10*3/mm3 212 217     Results from last 7 days   Lab Units 03/10/25  0530 03/09/25  0910   SODIUM mmol/L 140 138   POTASSIUM mmol/L 4.5 4.8   CHLORIDE mmol/L 108* 101   CO2 mmol/L 22.0 19.0*   BUN mg/dL 17 21*   CREATININE mg/dL 1.65* 1.78*   CALCIUM mg/dL 8.5* 9.2   BILIRUBIN mg/dL  --  0.2   ALK PHOS U/L  --  96   ALT (SGPT) U/L  --  17   AST (SGOT) U/L  --  21   GLUCOSE mg/dL 101* 156*         DIAGNOSTICS:  MRI Brain With & Without Contrast  Result Date: 3/10/2025  Patient: BUD, SILVIA  Time Out: 02:29 Exam(s): MRI HEAD W WO Contrast EXAM:   MR Head Without and With Intravenous Contrast CLINICAL HISTORY:    Reason  for exam: new onset seizure. TECHNIQUE:   Magnetic resonance images of the head brain without and with intravenous contrast in multiple planes. COMPARISON: 3 9 2025 FINDINGS:   Brain:  No mass.  No acute hemorrhage.  No restricted diffusion.  Right- sided encephalomalacia.  Right sided wallerian degeneration.  Scattered chronic microhemorrhages.  Right-sided pachymeningeal thickening, likely reactive from prior surgery.   Ventricles:  No hydrocephalus.   Bones joints: Postop changes.   Sinuses:  No acute sinusitis.   Mastoid air cells:  No effusion.   Orbits:  Unremarkable. IMPRESSION:       No acute infarct, hemorrhage, or hydrocephalus.     Electronically signed by Sarah Ramon MD on 03-10-25 at 0229    EEG  Result Date: 3/9/2025  Date of onset: 3/9/2025 Date of offset: 3/9/2025 Indication: 40 year old man with new onset seizure.  Technical description:  This is a 21 channel digital EEG recording that began on 1638 and ended on 1704.  Electrodes are placed based on the 10-20 international electrode placement system.  Background:  The EEG recording demonstrates mild to moderate diffuse background slowing with mainly theta and delta frequencies.  7-8 Hz frequencies are present posteriorly over the left hemisphere with more focal slowing noted over the right hemisphere.  The patient enters the drowsy state, but no well formed stage 2 sleep architecture is present.  Hyperventilation was not performed but photic stimulation was performed.  There is focal right hemispheric slowing with interictal discharges maximum in the right parietal-temporal head region.  No seizure is recorded. The EKG monitor shows a heart rate is around 70 beats per minute. Clinical interpretation:  This routine EEG is abnormal due to the presence of mild to moderate diffuse background slowing with more focal right hemispheric slowing and interictal discharges.  The results of this study may indicate mild to moderate encephalopathy, medication  effect, excessive drowsiness or bi-hemispheric dysfunction.  The focal right hemispheric slowing is indicative of underlying structural or functional abnormality and the right parietal-temporal discharges place patient at increased risk for focal and secondarily generalized seizures.  No seizure was recorded.  Findings ere discussed with ordering neuro-hospitalist.  Careful clinical correlation is advised.       CT Head Without Contrast  Result Date: 3/9/2025  CT HEAD WO CONTRAST-  DATE OF EXAM: 3/9/2025 10:34 AM  INDICATION: Seizure. Head trauma.  COMPARISON: None available. Correlation is made with the reports from the prior head CTs performed at an outside facility on 2/21/2024 and 1/23/2024 with the imaging not currently available for direct comparison.  TECHNIQUE: Multiple contiguous axial images were acquired through the head without the intravenous administration of contrast. Reformatted coronal and sagittal sequences were also reviewed. Radiation dose reduction techniques were utilized, including automated exposure control and exposure modulation based on body size.  FINDINGS: No acute intracranial hemorrhage or mass/mass effect. Postoperative changes from right calvarial craniotomy and pterional craniectomy with a 4 cm x 1.5 cm subcutaneous/subgaleal CSF attenuation collection overlying the pterional craniectomy consistent with a pseudomeningocele. Large region of encephalomalacia in the right frontal and temporal lobes and the right basal ganglia, likely from old infarct or hemorrhage. Associated ex vacuo dilatation of the right lateral ventricle. No hydrocephalus. Minimal 6 mm left to right midline shift. The basilar cisterns are patent. Gray-white matter differentiation is otherwise grossly maintained. Limited imaging of the orbits, paranasal sinuses, and mastoid air cells is unremarkable. No acute osseous abnormality is identified.       1. No acute intracranial hemorrhage or mass/mass effect. 2. Right  frontal temporal and right basal ganglia encephalomalacia, likely from old infarct or hemorrhage, with associated ex vacuo dilatation of the right lateral ventricle and mild left to right midline shift. 3. Postoperative changes from right calvarial craniotomy and right pterional craniectomy with a 4 cm x 1.5 cm subcutaneous/subgaleal CSF attenuation collection overlying the pterional craniectomy, consistent with a pseudomeningocele. Recommend comparison to prior imaging if available  This report was finalized on 3/9/2025 11:21 AM by Mino Estrada MD on Workstation: [id number]             Results Review:   I reviewed the patient's new clinical results.  I personally viewed and interpreted the patient's Chart, labs, medications, and imaging has been reviewed by and discussed with Dr. Pinedo     Vital Signs   Temp:  [97.6 °F (36.4 °C)-98.4 °F (36.9 °C)] 98 °F (36.7 °C)  Heart Rate:  [73-92] 83  Resp:  [18] 18  BP: (106-141)/(74-99) 136/92    Physical Exam:  Physical Exam  Vitals reviewed.   Constitutional:       General: He is awake. He is not in acute distress.     Appearance: He is not ill-appearing.      Comments: Drowsy. Awakens for a few minutes but easily falls back to sleep   HENT:      Head:      Comments: Large well healed right cranial scar. Small soft raised area near incision, non-tender to touch.  Eyes:      Extraocular Movements: Extraocular movements intact.      Pupils: Pupils are equal, round, and reactive to light.   Pulmonary:      Effort: Pulmonary effort is normal.   Skin:     General: Skin is warm and dry.   Neurological:      GCS: GCS eye subscore is 4. GCS verbal subscore is 5. GCS motor subscore is 6.      Cranial Nerves: Dysarthria present.       Neurological Exam  Mental Status  Awake and drowsy. Oriented to person, place and time. Recent and remote memory are intact. Mild dysarthria present. Language is fluent with no aphasia. Attention and concentration are normal.    Cranial Nerves  CN  "III, IV, VI: Extraocular movements intact bilaterally. Pupils equal round and reactive to light bilaterally.  Some left facial droop.    Motor    Moves all extremities. Weaker on the left. .    Sensory  Sensation is intact to light touch, pinprick, vibration and proprioception in all four extremities.    Gait    Not tested.      Assessment & Plan       New onset seizure      Problem List Items Addressed This Visit       * (Principal) New onset seizure - Primary     Other Visit Diagnoses         Left leg pain               Pleasant 40-year-old male with complex medical history and s/p craniectomy and cranioplasty at [facility] from [facility] last year, presents with new onset seizure.  Started on Keppra per neurology and no additional seizures noted.  He is drowsy on exam but otherwise at baseline.  No acute findings on MRI or CT head but small area of fluid collection overlying craniectomy site consistent with pseudomeningocele.  Cranial incision is well-healed with small soft area of fluid underneath scalp near incision.  No need for urgent neurosurgical intervention, recommend that he follow-up with his neurosurgeons at [facility] for this. Otherwise will defer seizure management to neurology. No further recommendations at this time.  Please feel free to contact us for any questions or concerns.     PLAN:     Seizure management per neurology  Recommend to f/u with neurosurgeon at [facility]   Elevated CK per primary    I discussed the patient's findings and my recommendations with patient, family, primary care team, and Dr. Pinedo    During patient visit, I utilized appropriate personal protective equipment including gloves and mask.  Mask used was standard procedure mask. Appropriate PPE was worn during the entire visit.  Hand hygiene was completed before and after.     Mami Cantrell, MINESH  03/10/25  08:42 EDT    \"Dictated utilizing Dragon dictation\".       Electronically signed by Reese Pinedo MD at 03/10/25 " [id number]

## 2025-03-12 NOTE — PLAN OF CARE
Goal Outcome Evaluation:            Waiting for a neuro telemetry bed to be open. Patient has had no complaints. NS going at 150ml continuous. VSS, A+Ox4, NSR, RA, assist x1. Waiting for lab results to monitor CK.

## 2025-03-13 LAB
ALBUMIN SERPL-MCNC: 3.7 G/DL (ref 3.5–5.2)
ANION GAP SERPL CALCULATED.3IONS-SCNC: 12.7 MMOL/L (ref 5–15)
BUN SERPL-MCNC: 11 MG/DL (ref 6–20)
BUN/CREAT SERPL: 8.5 (ref 7–25)
CALCIUM SPEC-SCNC: 8.6 MG/DL (ref 8.6–10.5)
CHLORIDE SERPL-SCNC: 109 MMOL/L (ref 98–107)
CK SERPL-CCNC: 4149 U/L (ref 20–200)
CO2 SERPL-SCNC: 22.3 MMOL/L (ref 22–29)
CREAT SERPL-MCNC: 1.3 MG/DL (ref 0.76–1.27)
EGFRCR SERPLBLD CKD-EPI 2021: 70.8 ML/MIN/1.73
GLUCOSE SERPL-MCNC: 88 MG/DL (ref 65–99)
PHOSPHATE SERPL-MCNC: 3.2 MG/DL (ref 2.5–4.5)
POTASSIUM SERPL-SCNC: 4.1 MMOL/L (ref 3.5–5.2)
SODIUM SERPL-SCNC: 144 MMOL/L (ref 136–145)

## 2025-03-13 PROCEDURE — 80069 RENAL FUNCTION PANEL: CPT | Performed by: INTERNAL MEDICINE

## 2025-03-13 PROCEDURE — 25810000003 SODIUM CHLORIDE 0.9 % SOLUTION: Performed by: INTERNAL MEDICINE

## 2025-03-13 PROCEDURE — 97162 PT EVAL MOD COMPLEX 30 MIN: CPT | Performed by: PHYSICAL THERAPIST

## 2025-03-13 PROCEDURE — 25010000002 ONDANSETRON PER 1 MG: Performed by: NURSE PRACTITIONER

## 2025-03-13 PROCEDURE — 82550 ASSAY OF CK (CPK): CPT | Performed by: INTERNAL MEDICINE

## 2025-03-13 RX ADMIN — BACLOFEN 15 MG: 10 TABLET ORAL at 08:44

## 2025-03-13 RX ADMIN — CARVEDILOL 12.5 MG: 12.5 TABLET, FILM COATED ORAL at 20:55

## 2025-03-13 RX ADMIN — LEVETIRACETAM 500 MG: 500 TABLET, FILM COATED ORAL at 20:56

## 2025-03-13 RX ADMIN — TRAZODONE HYDROCHLORIDE 50 MG: 50 TABLET ORAL at 20:56

## 2025-03-13 RX ADMIN — ONDANSETRON 4 MG: 2 INJECTION, SOLUTION INTRAMUSCULAR; INTRAVENOUS at 07:56

## 2025-03-13 RX ADMIN — TIZANIDINE 3 MG: 4 TABLET ORAL at 14:09

## 2025-03-13 RX ADMIN — OXYCODONE HYDROCHLORIDE AND ACETAMINOPHEN 250 MG: 500 TABLET ORAL at 14:08

## 2025-03-13 RX ADMIN — TIZANIDINE 3 MG: 4 TABLET ORAL at 20:55

## 2025-03-13 RX ADMIN — CARVEDILOL 12.5 MG: 12.5 TABLET, FILM COATED ORAL at 08:44

## 2025-03-13 RX ADMIN — LEVETIRACETAM 500 MG: 500 TABLET, FILM COATED ORAL at 08:44

## 2025-03-13 RX ADMIN — TERAZOSIN HYDROCHLORIDE 5 MG: 5 CAPSULE ORAL at 20:55

## 2025-03-13 RX ADMIN — TIZANIDINE 3 MG: 4 TABLET ORAL at 08:44

## 2025-03-13 RX ADMIN — TERAZOSIN HYDROCHLORIDE 5 MG: 5 CAPSULE ORAL at 08:44

## 2025-03-13 RX ADMIN — GABAPENTIN 100 MG: 100 CAPSULE ORAL at 20:56

## 2025-03-13 RX ADMIN — SODIUM CHLORIDE 125 ML/HR: 9 INJECTION, SOLUTION INTRAVENOUS at 05:22

## 2025-03-13 RX ADMIN — DULOXETINE 40 MG: 20 CAPSULE, DELAYED RELEASE ORAL at 08:44

## 2025-03-13 RX ADMIN — BACLOFEN 15 MG: 10 TABLET ORAL at 20:56

## 2025-03-13 RX ADMIN — AMLODIPINE BESYLATE 7.5 MG: 2.5 TABLET ORAL at 20:56

## 2025-03-13 RX ADMIN — BACLOFEN 15 MG: 10 TABLET ORAL at 14:09

## 2025-03-13 NOTE — PLAN OF CARE
Goal Outcome Evaluation:   Patient on a special bariatric mattress,  and IV continuous fluids decreased.

## 2025-03-13 NOTE — PLAN OF CARE
Goal Outcome Evaluation:  Plan of Care Reviewed With: patient, parent           Outcome Evaluation: Patient was admitted with seizure activity.  Patient has a history of CVA with left spastic hemiplegia.  Patient lives with his parents who assist with all activities.  Patient completes transfers at baseline but is not ambulatory.  He is currently working with outpatient PT.  He typically wears a left AFO and left knee brace to prevent hyperextension when working on standing.  These braces are not present at the hospital, but family plans to bring them.  Patient was in bed and agreeable to therapy.  He had been sleeping prior to PT arrival and was obviously still fatigued during therapy.  He was able to transfer to sitting edge of bed with min assist.  He demonstrates independence sitting balance.  Patient presents with left upper extremity contraction. Pt was wearing a splint on LLE to combat ankle contracture. Pt's mother was at bedside and very involved in giving history as well assisting to position pt. Pt presents with baseline impairments compounded by recent immobility secondary to hospitalization. Pt would benefit from PT to address functional mobility and transfers while admitted. Pt plans to return to outpt PT upon d/c.    Anticipated Discharge Disposition (PT): home with 24/7 care, home with outpatient therapy services

## 2025-03-13 NOTE — THERAPY EVALUATION
Patient Name: John Cooper Jr.  : 1985    MRN: [id number]                              Today's Date: 3/13/2025       Admit Date: 3/9/2025    Visit Dx:     ICD-10-CM ICD-9-CM   1. New onset seizure  R56.9 780.39   2. Left leg pain  M79.605 729.5     Patient Active Problem List   Diagnosis    Primary hypertension    Mixed hyperlipidemia    Abnormal ECG    Class 1 obesity due to excess calories without serious comorbidity with body mass index (BMI) of 33.0 to 33.9 in adult    NICM (nonischemic cardiomyopathy)    Acute HFrEF (heart failure with reduced ejection fraction)    FAUSTINO (obstructive sleep apnea)    Hemorrhagic stroke    Central sleep apnea secondary to cerebrovascular accident (CVA)    Psychophysiological insomnia    Nerve pain    New onset seizure    Rhabdomyolysis    Acute kidney injury    Seizure, late effect of stroke     Past Medical History:   Diagnosis Date    Allergic     Stroke      Past Surgical History:   Procedure Laterality Date    ANKLE SURGERY Right 2015    MANDIBLE SURGERY Right 2019      General Information       [facility] Name 25 1337          Physical Therapy Time and Intention    Document Type evaluation  -     Mode of Treatment individual therapy;physical therapy  -       Row Name 25 8006          General Information    Patient Profile Reviewed yes  -     Prior Level of Function --  Transfer to w/c and bathroom at baseline. Parents assist him. Wears L AFo and Knee brace, but those aren't here. Family plans to bring them. Current with outpt PT at [facility]  -     Existing Precautions/Restrictions fall  wears L AFO and knee brace  -     Barriers to Rehab previous functional deficit  -       Row Name 25 1060          Living Environment    Current Living Arrangements home  -     People in Home parent(s)  -       Row Name 25 1408          Home Main Entrance    Number of Stairs, Main Entrance none  -       Row Name 25 2572          Cognition     Orientation Status (Cognition) oriented x 4  -       Row Name 03/13/25 1334          Safety Issues/Impairments Affecting Functional Mobility    Impairments Affecting Function (Mobility) muscle tone abnormal;range of motion (ROM);strength;endurance/activity tolerance  -               User Key  (r) = Recorded By, (t) = Taken By, (c) = Cosigned By      Initials Name Provider Type    Shona Dexter, PT Physical Therapist                   Mobility       Row Name 03/13/25 1336          Bed Mobility    Supine-Sit [place] (Bed Mobility) minimum assist (75% patient effort)  -     Sit-Supine [place] (Bed Mobility) minimum assist (75% patient effort)  -     Assistive Device (Bed Mobility) head of bed elevated;bed rails  -       Row Name 03/13/25 1336          Transfers    Comment, (Transfers) Transfers not assessed.  Left AFO and left knee brace not at the hospital.  Family plans to bring these  -               User Key  (r) = Recorded By, (t) = Taken By, (c) = Cosigned By      Initials Name Provider Type    Shona Dexter PT Physical Therapist                   Obj/Interventions       Row Name 03/13/25 1336          Range of Motion Comprehensive    Comment, General Range of Motion LLE/ LUE impiared at baseline  -       Row Name 03/13/25 1336          Strength Comprehensive (MMT)    Comment, General Manual Muscle Testing (MMT) Assessment RLE 4+/5, LLE at baseline impairment  -       Row Name 03/13/25 1336          Balance    Balance Assessment sitting static balance;sitting dynamic balance  -     Static Sitting Balance standby assist  -     Dynamic Sitting Balance standby assist  -     Position, Sitting Balance sitting edge of bed  -               User Key  (r) = Recorded By, (t) = Taken By, (c) = Cosigned By      Initials Name Provider Type    Shona Dexter PT Physical Therapist                   Goals/Plan       Row Name 03/13/25 1350          Bed  Mobility Goal 1 (PT)    Activity/Assistive Device (Bed Mobility Goal 1, PT) bed mobility activities, all  -     [place] Level/Cues Needed (Bed Mobility Goal 1, PT) standby assist  -     Time Frame (Bed Mobility Goal 1, PT) 10 days  -       Row Name 03/13/25 7997          Transfer Goal 1 (PT)    Activity/Assistive Device (Transfer Goal 1, PT) sit-to-stand/stand-to-sit;bed-to-chair/chair-to-bed  -     [place] Level/Cues Needed (Transfer Goal 1, PT) minimum assist (75% or more patient effort)  -     Time Frame (Transfer Goal 1, PT) 10 days  -       Row Name 03/13/25 1350          Therapy Assessment/Plan (PT)    Planned Therapy Interventions (PT) bed mobility training;home exercise program;strengthening;ROM (range of motion);transfer training;patient/family education;balance training  -               User Key  (r) = Recorded By, (t) = Taken By, (c) = Cosigned By      Initials Name Provider Type     Shona Bailey, PT Physical Therapist                   Clinical Impression       Row Name 03/13/25 1444          Pain    Pretreatment Pain Rating 0/10 - no pain  -     Posttreatment Pain Rating 0/10 - no pain  -       Row Name 03/13/25 2273          Plan of Care Review    Plan of Care Reviewed With patient;parent  -     Outcome Evaluation Patient was admitted with seizure activity.  Patient has a history of CVA with left spastic hemiplegia.  Patient lives with his parents who assist with all activities.  Patient completes transfers at baseline but is not ambulatory.  He is currently working with outpatient PT.  He typically wears a left AFO and left knee brace to prevent hyperextension when working on standing.  These braces are not present at the hospital, but family plans to bring them.  Patient was in bed and agreeable to therapy.  He had been sleeping prior to PT arrival and was obviously still fatigued during therapy.  He was able to transfer to sitting edge of bed with min  assist.  He demonstrates independence sitting balance.  Patient presents with left upper extremity contraction. Pt was wearing a splint on LLE to combat ankle contracture. Pt's mother was at bedside and very involved in giving history as well assisting to position pt. Pt presents with baseline impairments compounded by recent immobility secondary to hospitalization. Pt would benefit from PT to address functional mobility and transfers while admitted. Pt plans to return to outpt PT upon d/c.  -       Row Name 03/13/25 6128          Therapy Assessment/Plan (PT)    Rehab Potential (PT) fair  -     Criteria for Skilled Interventions Met (PT) yes  -     Therapy Frequency (PT) 3 times/wk  -       Row Name 03/13/25 4992          Positioning and Restraints    Pre-Treatment Position in bed  -     Post Treatment Position bed  -     In Bed fowlers;call light within reach;encouraged to call for assist;exit alarm on;with family/caregiver  -               User Key  (r) = Recorded By, (t) = Taken By, (c) = Cosigned By      Initials Name Provider Type    Shona Dexter, PT Physical Therapist                   Outcome Measures       Row Name 03/13/25 3385 03/13/25 0809       How much help from another person do you currently need...    Turning from your back to your side while in flat bed without using bedrails? 3  -KH 1  -EC    Moving from lying on back to sitting on the side of a flat bed without bedrails? 3  -KH 1  -EC    Moving to and from a bed to a chair (including a wheelchair)? 1  -KH 1  -EC    Standing up from a chair using your arms (e.g., wheelchair, bedside chair)? 1  -KH 1  -EC    Climbing 3-5 steps with a railing? 1  -KH 1  -EC    To walk in hospital room? 1  -KH 1  -EC    AM-PAC 6 Clicks Score (PT) 10  - 6  -EC    Highest Level of Mobility Goal 4 --> Transfer to chair/commode  - 2 --> Bed activities/dependent transfer  -EC      Row Name 03/13/25 2232          Functional Assessment     Outcome Measure Options AM-PAC 6 Clicks Basic Mobility (PT)  -DIONICIO               User Key  (r) = Recorded By, (t) = Taken By, (c) = Cosigned By      Initials Name Provider Type    Shona Dexter PT Physical Therapist    Zenia Betts, RN Registered Nurse                                 Physical Therapy Education       Title: PT OT SLP Therapies (In Progress)       Topic: Physical Therapy (Not Started)       Point: Mobility training (Not Started)       Learner Progress:  Not documented in this visit.              Point: Home exercise program (Not Started)       Learner Progress:  Not documented in this visit.              Point: Body mechanics (Not Started)       Learner Progress:  Not documented in this visit.              Point: Precautions (Not Started)       Learner Progress:  Not documented in this visit.                                  PT Recommendation and Plan  Planned Therapy Interventions (PT): bed mobility training, home exercise program, strengthening, ROM (range of motion), transfer training, patient/family education, balance training  Outcome Evaluation: Patient was admitted with seizure activity.  Patient has a history of CVA with left spastic hemiplegia.  Patient lives with his parents who assist with all activities.  Patient completes transfers at baseline but is not ambulatory.  He is currently working with outpatient PT.  He typically wears a left AFO and left knee brace to prevent hyperextension when working on standing.  These braces are not present at the hospital, but family plans to bring them.  Patient was in bed and agreeable to therapy.  He had been sleeping prior to PT arrival and was obviously still fatigued during therapy.  He was able to transfer to sitting edge of bed with min assist.  He demonstrates independence sitting balance.  Patient presents with left upper extremity contraction. Pt was wearing a splint on LLE to combat ankle contracture. Pt's mother was at  bedside and very involved in giving history as well assisting to position pt. Pt presents with baseline impairments compounded by recent immobility secondary to hospitalization. Pt would benefit from PT to address functional mobility and transfers while admitted. Pt plans to return to outpt PT upon d/c.     Time Calculation:         PT Charges       Row Name 03/13/25 1351             Time Calculation    Start Time 1030  -KH      Stop Time 1047  -KH      Time Calculation (min) 17 min  -KH      PT Received On 03/13/25  -      PT - Next Appointment 03/14/25  -      PT Goal Re-Cert Due Date 03/20/25  -                User Key  (r) = Recorded By, (t) = Taken By, (c) = Cosigned By      Initials Name Provider Type    Shona Dexter, PT Physical Therapist                  Therapy Charges for Today       Code Description Service Date Service Provider Modifiers Qty    44888721075 HC PT EVAL MOD COMPLEXITY 2 3/13/2025 Shona Bailey, PT GP 1    54940931617 HC PT EVAL MOD COMPLEXITY 3 3/13/2025 Shona Bailey, PT GP 1            PT G-Codes  Outcome Measure Options: AM-PAC 6 Clicks Basic Mobility (PT)  AM-PAC 6 Clicks Score (PT): 10  AM-PAC 6 Clicks Score (OT): 14  Modified Bartow Scale: 4 - Moderately severe disability.  Unable to walk without assistance, and unable to attend to own bodily needs without assistance.  PT Discharge Summary  Anticipated Discharge Disposition (PT): home with 24/7 care, home with outpatient therapy services    Shona Bailey, PT  3/13/2025

## 2025-03-13 NOTE — PROGRESS NOTES
Name: John Cooper Jr. ADMIT: 3/9/2025   : 1985  PCP: Nancy Mendez APRN    MRN: [id number] LOS: 2 days   AGE/SEX: 40 y.o. male  ROOM: [facility]     Subjective   Subjective   No new complaints overnight.  No further seizure activity.       Objective   Objective   Vital Signs  Temp:  [97.9 °F (36.6 °C)-98.6 °F (37 °C)] 98.1 °F (36.7 °C)  Heart Rate:  [67-91] 67  Resp:  [18] 18  BP: (115-144)/(74-93) 144/74  SpO2:  [96 %-97 %] 96 %  on   ;   Device (Oxygen Therapy): room air  Body mass index is 30.11 kg/m².  Physical Exam  Vitals and nursing note reviewed.   Constitutional:       General: He is not in acute distress.     Appearance: He is ill-appearing (Chronically).   Cardiovascular:      Rate and Rhythm: Normal rate and regular rhythm.   Pulmonary:      Effort: Pulmonary effort is normal.      Breath sounds: Normal breath sounds.   Abdominal:      General: Bowel sounds are normal.      Palpations: Abdomen is soft.      Tenderness: There is no abdominal tenderness.   Musculoskeletal:         General: No swelling.   Skin:     General: Skin is warm and dry.   Neurological:      Mental Status: He is alert.      Comments: Left-sided weakness       Results Review     I reviewed the patient's new clinical results.  Results from last 7 days   Lab Units 25  0547 03/10/25  0530 25  0910   WBC 10*3/mm3 5.03 5.09 5.03   HEMOGLOBIN g/dL 12.2* 11.7* 12.7*   PLATELETS 10*3/mm3 191 212 217     Results from last 7 days   Lab Units 25  0503 25  0640 25  0547 03/10/25  0530   SODIUM mmol/L 144 141 142 140   POTASSIUM mmol/L 4.1 4.1 4.0 4.5   CHLORIDE mmol/L 109* 106 108* 108*   CO2 mmol/L 22.3 21.8* 21.9* 22.0   BUN mg/dL 11 10 12 17   CREATININE mg/dL 1.30* 1.23 1.35* 1.65*   GLUCOSE mg/dL 88 92 96 101*   EGFR mL/min/1.73 70.8 75.6 67.6 53.2*     Results from last 7 days   Lab Units 25  0503 25  0640 25  0910   ALBUMIN g/dL 3.7 3.7 4.3   BILIRUBIN mg/dL  --  0.2 0.2   ALK  PHOS U/L  --  77 96   AST (SGOT) U/L  --  33 21   ALT (SGPT) U/L  --  16 17     Results from last 7 days   Lab Units 03/13/25  0503 03/12/25  0640 03/11/25  0547 03/10/25  0530 03/09/25  0910   CALCIUM mg/dL 8.6 8.4* 8.3* 8.5* 9.2   ALBUMIN g/dL 3.7 3.7  --   --  4.3   MAGNESIUM mg/dL  --  1.8  --   --   --    PHOSPHORUS mg/dL 3.2 3.2  --   --   --      Results from last 7 days   Lab Units 03/09/25  1422 03/09/25  0910   LACTATE mmol/L 1.2 7.3*     Results from last 7 days   Lab Units 03/13/25  0503 03/12/25  0640 03/11/25  1218 03/11/25  0547 03/11/25  0000   CK TOTAL U/L 4,149* 6,575* 8,885* 8,786* 8,652*       I have personally reviewed all medications:  Scheduled Medications  amLODIPine, 7.5 mg, Oral, Nightly  vitamin C, 250 mg, Oral, Daily  baclofen, 15 mg, Oral, TID  carvedilol, 12.5 mg, Oral, BID With Meals  DULoxetine, 40 mg, Oral, Daily  gabapentin, 100 mg, Oral, Nightly  levETIRAcetam, 500 mg, Oral, BID  [Held by provider] spironolactone, 25 mg, Oral, Daily  terazosin, 5 mg, Oral, Q12H  tiZANidine, 3 mg, Oral, TID  traZODone, 50 mg, Oral, Nightly    Infusions  sodium chloride, 50 mL/hr, Last Rate: 50 mL/hr (03/13/25 0757)    Diet  Diet: Regular/House; Fluid Consistency: Thin (IDDSI 0)    I have personally reviewed:  [x]  Laboratory   [x]  Microbiology   [x]  Radiology   [x]  EKG/Telemetry  [x]  Cardiology/Vascular   []  Pathology    []  Records       Assessment/Plan     Active Hospital Problems    Diagnosis  POA    **Seizure, late effect of stroke [I69.398, R56.9]  Not Applicable    Acute kidney injury [N17.9]  Yes    Rhabdomyolysis [M62.82]  Yes    New onset seizure [R56.9]  Yes    FAUSTINO (obstructive sleep apnea) [G47.33]  Yes    Primary hypertension [I10]  Yes      Resolved Hospital Problems   No resolved problems to display.       40 y.o. male admitted with Seizure, late effect of stroke and JANET secondary to rhabdomyolysis.    AFVSS  Normal O2 sats  CPK 4149 down from 6575  Creatinine 1.3, stable      CPK  trending down appropriately.  Renal function is stable.  Continue IV fluids per nephrology.  Continue oral Keppra.  PT/OT following.  Plan discharge home when okay with nephrology.       SCDs for DVT prophylaxis.  Full code.  Discussed with patient, family, and care team on multidisciplinary rounds.  Anticipate discharge home with family in 1-2 days.  Expected Discharge Date: 3/14/2025; Expected Discharge Time:       Rei Lee MD  [facility]  03/13/25  09:30 EDT

## 2025-03-13 NOTE — PROGRESS NOTES
[facility] Progress Note      Patient Name: John Cooper Jr.  : 1985  MRN: [id number]  Primary Care Physician:  Nancy Mendez APRN  Date of admission: 3/9/2025    Subjective     Interval History:   Follow up elevated CK and JANET.  UOP   And IVF not all recorded.  Reduced IVF rate today.  Vomited this am, but able to eat some breakfast and lunch.  Bowels moved. PT evaluated. Unable to stand due to not having brace.   Review of Systems:   As noted above    Objective     Vitals:   Temp:  [97.9 °F (36.6 °C)-98.6 °F (37 °C)] 98.4 °F (36.9 °C)  Heart Rate:  [67-91] 69  Resp:  [18-20] 20  BP: (115-144)/(70-93) 115/70    Intake/Output Summary (Last 24 hours) at 3/13/2025 1310  Last data filed at 3/13/2025 1232  Gross per 24 hour   Intake 822 ml   Output 1250 ml   Net -428 ml       Physical Exam:    General Appearance: alert, no acute distress   Skin: warm and dry  HEENT: oral mucosa normal, nonicteric sclera  Neck: supple, no JVD  Lungs: clear to auscultation.   Heart: RRR, normal S1 and S2  Abdomen: soft, nontender, nondistended. +bs  : no palpable bladder  Extremities: no edema. Muscles soft.   Neuro: normal speech and mental status     Scheduled Meds:     amLODIPine, 7.5 mg, Oral, Nightly  vitamin C, 250 mg, Oral, Daily  baclofen, 15 mg, Oral, TID  carvedilol, 12.5 mg, Oral, BID With Meals  DULoxetine, 40 mg, Oral, Daily  gabapentin, 100 mg, Oral, Nightly  levETIRAcetam, 500 mg, Oral, BID  [Held by provider] spironolactone, 25 mg, Oral, Daily  terazosin, 5 mg, Oral, Q12H  tiZANidine, 3 mg, Oral, TID  traZODone, 50 mg, Oral, Nightly      IV Meds:   sodium chloride, 50 mL/hr, Last Rate: 50 mL/hr (25 0757)        Results Reviewed:   I have personally reviewed the results from the time of this admission to 3/13/2025 13:10 EDT     Results from last 7 days   Lab Units 25  0503 25  0640 25  0547 03/10/25  0530 25  0910   SODIUM mmol/L 144 141 142   < > 138    POTASSIUM mmol/L 4.1 4.1 4.0   < > 4.8   CHLORIDE mmol/L 109* 106 108*   < > 101   CO2 mmol/L 22.3 21.8* 21.9*   < > 19.0*   BUN mg/dL 11 10 12   < > 21*   CREATININE mg/dL 1.30* 1.23 1.35*   < > 1.78*   CALCIUM mg/dL 8.6 8.4* 8.3*   < > 9.2   BILIRUBIN mg/dL  --  0.2  --   --  0.2   ALK PHOS U/L  --  77  --   --  96   ALT (SGPT) U/L  --  16  --   --  17   AST (SGOT) U/L  --  33  --   --  21   GLUCOSE mg/dL 88 92 96   < > 156*    < > = values in this interval not displayed.       Estimated Creatinine Clearance: 81.4 mL/min (A) (by C-G formula based on SCr of 1.3 mg/dL (H)).    Results from last 7 days   Lab Units 03/13/25  0503 03/12/25  0640   MAGNESIUM mg/dL  --  1.8   PHOSPHORUS mg/dL 3.2 3.2             Results from last 7 days   Lab Units 03/11/25  0547 03/10/25  0530 03/09/25  0910   WBC 10*3/mm3 5.03 5.09 5.03   HEMOGLOBIN g/dL 12.2* 11.7* 12.7*   PLATELETS 10*3/mm3 191 212 217             Assessment / Plan     ASSESSMENT:  JANET on ckd 3 with baseline creatinine 1.5-1.7 , likely multifactorial including rhabdomyolysis with elevated CPK after new onset seizure in the setting of sacubitril valsartan.  Urine with small blood, no red cells.  Nonoliguric.  On IV fluids at rate of 150/h.  His creatinine  improved yesterday to 1.35 from a peak of 1.78 days ago.  1.3 today. No evidence of compartment syndrome by exam.   2.  New onset seizure.  Neurology evaluation.  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  Now on Keppra.  Baclofen recently increased to 20 mg tid with reduction and weaning off Gabapentin. More sleepy lately.  I have reduced baclofen to 15 mg tid and tizanidine to 3 mg tid.  Slowly reducing.  WIll DW neurology.   He is followed by [facility] physical medicine .  3.  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  Left side spastic hemiplegia.  4.  Prior history of heart failure  reduced ejection fraction, EF on most recent echo 1/28/2025 normal.  5.  Hypertension.  Holding  sacubitril/valsartan for now until renal function returns to normal and CK is normalized.  Continue Terazosin twice daily ( substitute for cardura), home Coreg and Norvasc.  Reduce IVF rate.   No evidence of volume excess for now.  6. Obstructive sleep apnea    PLAN:  Dr. Holden PMR to see today to see if any med adjustment needed.  Reduce IVF to 50 cc per hour.  IF stable tomorrow, could go home.     Thank you for involving us in the care of John Cooper Jr..  Please feel free to call with any questions.    Stefany Lopez MD  03/13/25  13:10 EDT    [facility]  [phone]    Please note that portions of this note were completed with a voice recognition program.

## 2025-03-13 NOTE — CONSULTS
[facility] PM[facility] CONSULT NOTE    Date of consult:  3/13/2025    Patient Identification:  Patient Name:  John Cooper Jr.  Admit Date: 3/9/2025  Room # N531/1  Age: 40 y.o.  Sex: male  YOB: 1985  MRN:  [id number]  Code Status:   Code Status and Medical Interventions: CPR (Attempt to Resuscitate); Full Support   Ordered at: 03/09/25 1151     Code Status (Patient has no pulse and is not breathing):    CPR (Attempt to Resuscitate)     Medical Interventions (Patient has pulse or is breathing):    Full Support     Level Of Support Discussed With:    Patient     PCP: Nancy Mendez APRN    Requesting Physician:  Dr. Rei Lee MD  Reason for Consultation:  Evaluation for Acute Inpatient Rehabilitation    CHIEF COMPLAINT: functioning a little slower than normal    History of Present Illness  Mr. Cooper is a 40 y.o. bmale with PMH of  HTN, HLD, NICM, HFrEF, FAUSTINO, Right Basal Ganglia ICH s/p decompression evacuation on 11/16/2023 and right cranioplasty 2/12/2024, Obesity BMI 30.11, seizure-like spells without recurrence, and hemorrhagic stroke with left hemiplasia. He has done well since 11/2024 and without any clinical seizures.  He sees [facility] with Dr Figueroa for management of spasticity.   Pt. Admitted to [facility] on 3/9/2025 for New onset seizure (late effect of stroke), JANET, and Rhabdomyolysis.  Night of 3/8/2025, his mother gave him some extra trazodone for daylight savings to help him sleep.  This morning he seemed to be at his neurologic baseline but then had an unprovoked generalized seizure.  This lasted 7 to 10 minutes but stopped on its own around the time when EMS arrived.  Currently he seems to be back to his neurologic baseline.    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 3/8 by Mom due to change to daylight savings time  - neurology following  - seizure lasted 7-10 minutes at home  - on oral Keppra 500 mg  bid  H/o Right Basal Ganglia ICH  S/p decompression evacuation 11/16/2023  H/o Stroke with left hemiplasia  Obesity BMI 30.11  Left sided spastic hemiplegia  - no change with increase in baclofen last month to 20 mg tid and more sleepy with new dose  - reducing Baclofen to 15 mg tid since 3/11; slow reduction; possibly decrease to 10 mg tid  - reducing Tizanidine from 4 mg to 3 mg tid (per home dose)  - PT/OT    JANET  - creat peak 1.78 and now 1.3 and stable  - likely multifactorial including rhabdomyolysis with elevated CPK after new onset seizure in the setting of sacubitril valsartan.    - Urine with small blood, no red cells.  Nonoliguric.    - On IV fluids at rate of 150/h.      Rhabdomyolysis  - CPK 4149 down from 6575  - on IV fluids per nephrology    HTN  - Sacubitril/Valsartan on hold 2/2 JANET  - on Terazosin 5 mg q 12 hrs since 3/9  - on Norvasc 7.5 mg q HS since 3/12  - On coreg 12.5 mg bid since 3/9  - monitoring 2/2 IV fluids    HLD  NICM  HFrEF  - EF on most recent ECHO 1/28/25 Normal  FAUSTINO    DVT Prophylaxis: SCDs    PLOF:    parents assist him with mobility and toileting.    significant hemiparesis.  transfers only at baseline and requires assistance for all BADLs from bed/chair level.   significant L sides tone/spasticity which pt reports is chronic from ICH     CLOF:    PT 3/10  patient and family who feel patient is at his baseline. No skilled acute PT needs at this time. PT will sign off.)     OT 3/10  SBA for supine>sit. Completed UB grooming/hygiene w/ setup. Pt politely declined transfers/OOB, scooting to HOB w/ SBA and returned to supine w/ Min A. Pt and family report pt is performing at his baseline, pt is current w/ OP therapy at [facility] and prefers to continue w/ this at d/c. No further acute OT needs at this time.     During visit today, pt. Is lying in bed, alert and awake and in NAD.  Parents are at his side.  Pt. C/o being a little slower than usual with cognition.  Mom verbalized pt.  Usually is more alert and social and with outgoing personality but difference since Keppra started.  Per pt. And family, his seizure on Sunday, 3/9 was first one ever; parents were visiting patient in his room and pt. Was lying in the bed and his normal self and parents left room to fix breakfast and 3 minutes later heard pts. Bed rails shaking and pt. Had a seizure lasting 12 minutes.  Discussion and education with patient and family regarding possible triggers to seizure and pt. With no pre-warning and doesn't recall having seizure.  Per family and pt., only med changes include increase in Baclofen to 20 mg tid and pt. Did take 75 mg of Trazodone instead of usual dose of 50 mg due to time change and concern for disruption of sleep cycle but prescription is up to 100 mg q HS.  A serious reaction of baclofen and trazodone is seizure.  Both being slowly decreased currently by Hospital Mds.  Pt. On tizanidine 3 mg tid which is lower than his home dose, so will recommend consider increasing if pt. Exhibits increase in tone/spasticity.  Per pt. And family, pt. Is currently not exhibiting any increase in tone or spasticity.  Discussion with pt. And parents on rehab options and pt. And parents prefer AIR at [facility] downw under the care of Dr Quick if able.  If discharged home would like to go to [facility] for Outpatient rehab.    Past Medical History:  Past Medical History:   Diagnosis Date    Allergic     Stroke      Past Surgical History:  Past Surgical History:   Procedure Laterality Date    ANKLE SURGERY Right 2015    MANDIBLE SURGERY Right 2019      Social History:   He lives at home with his parents   Social History     Tobacco Use    Smoking status: Never     Passive exposure: Never    Smokeless tobacco: Never    Tobacco comments:     Social cigar smoker   Substance Use Topics    Alcohol use: Yes     Comment: social      Family History:  Family History   Problem Relation Age of Onset    Hypertension Father      Bone cancer Brother     Colon cancer Maternal Grandmother       Allergies:  Allergies   Allergen Reactions    Banana Unknown - High Severity     no problems with anesthesia    Latex Angioedema    Other Swelling     BANANAS, STATES LIPS BEGAN TO SWELL AND TINGLE     Current Meds:    Current Facility-Administered Medications:     acetaminophen (TYLENOL) tablet 650 mg, 650 mg, Oral, Q6H PRN, Amanda Proctor APRN, 650 mg at 03/09/25 1937    amLODIPine (NORVASC) tablet 7.5 mg, 7.5 mg, Oral, Nightly, Stefany Lopez MD, 7.5 mg at 03/12/25 2114    ascorbic acid (VITAMIN C) tablet 250 mg, 250 mg, Oral, Daily, Stefany Lopez MD, 250 mg at 03/12/25 1404    baclofen (LIORESAL) tablet 15 mg, 15 mg, Oral, TID, Stefany Lopez MD, 15 mg at 03/13/25 0844    sennosides-docusate (PERICOLACE) 8.6-50 MG per tablet 2 tablet, 2 tablet, Oral, BID PRN **AND** polyethylene glycol (MIRALAX) packet 17 g, 17 g, Oral, Daily PRN **AND** bisacodyl (DULCOLAX) EC tablet 5 mg, 5 mg, Oral, Daily PRN **AND** bisacodyl (DULCOLAX) suppository 10 mg, 10 mg, Rectal, Daily PRN, Amanda Proctor, APRN    carvedilol (COREG) tablet 12.5 mg, 12.5 mg, Oral, BID With Meals, Amanda Proctor APRN, 12.5 mg at 03/13/25 0844    DULoxetine (CYMBALTA) DR capsule 40 mg, 40 mg, Oral, Daily, Diann Bar APRN, 40 mg at 03/13/25 0844    gabapentin (NEURONTIN) capsule 100 mg, 100 mg, Oral, Nightly, Amanda Proctor APRN, 100 mg at 03/12/25 2115    levETIRAcetam (KEPPRA) tablet 500 mg, 500 mg, Oral, BID, Rei Lee MD, 500 mg at 03/13/25 0844    ondansetron ODT (ZOFRAN-ODT) disintegrating tablet 4 mg, 4 mg, Oral, Q6H PRN **OR** ondansetron (ZOFRAN) injection 4 mg, 4 mg, Intravenous, Q6H PRN, Amanda Proctor, APRN, 4 mg at 03/13/25 0756    sodium chloride 0.9 % infusion, 50 mL/hr, Intravenous, Continuous, Stefany Lopez MD, Last Rate: 50 mL/hr at 03/13/25 0757, 50 mL/hr at 03/13/25 0757    [Held by provider] spironolactone (ALDACTONE) tablet 25 mg, 25  mg, Oral, Daily, Amanda Proctor APRN    terazosin (HYTRIN) capsule 5 mg, 5 mg, Oral, Q12H, Amanda Proctor APRN, 5 mg at 03/13/25 0844    tiZANidine (ZANAFLEX) tablet 3 mg, 3 mg, Oral, TID, Stefany Lopez MD, 3 mg at 03/13/25 0844    traZODone (DESYREL) tablet 50 mg, 50 mg, Oral, Nightly, Amanda Proctor APRN, 50 mg at 03/12/25 2115   Home Meds:  Medications Prior to Admission   Medication Sig Dispense Refill Last Dose/Taking    acetaminophen (TYLENOL) 325 MG tablet Take 2 tablets by mouth Every 6 (Six) Hours As Needed for Mild Pain.   Past Month    amLODIPine (NORVASC) 5 MG tablet Take 1 tablet by mouth Every Night. 90 tablet 3 3/8/2025    carvedilol (COREG) 12.5 MG tablet Take 1 tablet by mouth 2 (Two) Times a Day With Meals. 180 tablet 1 3/8/2025 at  8:00 PM    doxazosin (CARDURA) 8 MG tablet Take 1 tablet by mouth 2 (Two) Times a Day. 180 tablet 1 3/8/2025    DULoxetine HCl 40 MG capsule delayed-release particles Take 1 capsule by mouth Daily.   Taking    gabapentin (NEURONTIN) 300 MG capsule Take 100 mg by mouth Every Night.   3/8/2025    sacubitril-valsartan (Entresto) 24-26 MG tablet Take 1 tablet by mouth 2 (Two) Times a Day. 180 tablet 3 3/8/2025    spironolactone (ALDACTONE) 25 MG tablet Take 1 tablet by mouth Daily. 90 tablet 1 3/9/2025 at 11:30 AM    tiZANidine (ZANAFLEX) 4 MG tablet Take 1 tablet by mouth 3 (Three) Times a Day. 180 tablet 1 3/8/2025 at  8:30 PM    traZODone (DESYREL) 50 MG tablet Take 1-2 tablets by mouth Every Night. 180 tablet 1 3/8/2025    vitamin C (ASCORBIC ACID) 250 MG tablet Take 1 tablet by mouth Daily. Take with iron 90 tablet 0 3/8/2025 at  2:00 PM       Data Review:  CBC:   WBC   Date Value Ref Range Status   03/11/2025 5.03 3.40 - 10.80 10*3/mm3 Final     RBC   Date Value Ref Range Status   03/11/2025 4.32 4.14 - 5.80 10*6/mm3 Final     Hemoglobin   Date Value Ref Range Status   03/11/2025 12.2 (L) 13.0 - 17.7 g/dL Final     Hematocrit   Date Value Ref Range Status    03/11/2025 37.1 (L) 37.5 - 51.0 % Final     BMP:   Glucose   Date Value Ref Range Status   03/13/2025 88 65 - 99 mg/dL Final     Sodium   Date Value Ref Range Status   03/13/2025 144 136 - 145 mmol/L Final     CO2   Date Value Ref Range Status   03/13/2025 22.3 22.0 - 29.0 mmol/L Final     BUN   Date Value Ref Range Status   03/13/2025 11 6 - 20 mg/dL Final     Creatinine   Date Value Ref Range Status   03/13/2025 1.30 (H) 0.76 - 1.27 mg/dL Final     Calcium   Date Value Ref Range Status   03/13/2025 8.6 8.6 - 10.5 mg/dL Final       MRI Brain With & Without Contrast  Narrative: Patient: BUD, SILVIA  Time Out: 02:29  Exam(s): MRI HEAD W WO Contrast     EXAM:    MR Head Without and With Intravenous Contrast    CLINICAL HISTORY:     Reason for exam: new onset seizure.    TECHNIQUE:    Magnetic resonance images of the head brain without and with   intravenous contrast in multiple planes.    COMPARISON:  3 9 2025    FINDINGS:    Brain:  No mass.  No acute hemorrhage.  No restricted diffusion.  Right-  sided encephalomalacia.  Right sided wallerian degeneration.  Scattered   chronic microhemorrhages.  Right-sided pachymeningeal thickening, likely   reactive from prior surgery.    Ventricles:  No hydrocephalus.    Bones joints: Postop changes.    Sinuses:  No acute sinusitis.    Mastoid air cells:  No effusion.    Orbits:  Unremarkable.    IMPRESSION:         No acute infarct, hemorrhage, or hydrocephalus.  Impression: Electronically signed by Sarah Ramon MD on 03-10-25 at 0229    Review of Systems  General: Denies fevers/chills, or problems sleeping  HEENT: Denies cough, cold, rhinorrhea, blurry vision, or double vision  Cardiovascular: Denies chest pain, or palpitations  Resp: Denies shortness of breath, or difficulty breathing  GI: Denies n/v, diarrhea, or constipation  : Denies pain or burning with urination  Musculoskeletal: pain in left hand and left arm with PROM  Skin: Denies rashes, itching or  "breakdown  Neurologic: Denies numbness, tingling, but left hand, arm, and leg weakness, a little sluggish with cognition since on keppra  Psych: Denies anxiety, or depression  All other ROS negative except as noted above    Physical Exam   /70 (BP Location: Right arm, Patient Position: Lying)   Pulse 69   Temp 98.4 °F (36.9 °C) (Oral)   Resp 20   Ht 172.7 cm (68\")   Wt 89.8 kg (197 lb 15.9 oz)   SpO2 99%   BMI 30.11 kg/m²    Body mass index is 30.11 kg/m².  Gen: NAD, pleasant and responsive  Neuro:  AO x4, appropriate in conversation and able to follow commands  HEENT:  NCAT, EOMI, MMM  Lungs:  CTAB, unlabored and even respirations.  No rhonchi or wheezing  Heart:  RRR,, left hand edema  Abdomen:  soft, nt, nd, +BS   Skin:  No open lesions noted  MSK: Regular muscle tone and bulk in RUE and RLE, left hand contracture and LUE and LLE spasticity with very limited PROM in LUE and unable to perform PROM in LLE.  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 3/8 by Mom due to change to daylight savings time  - recent increase in Baclofen 20 mg TID  - seizure lasted 12 minutes at home per parents  H/o Right Basal Ganglia ICH  S/p decompression evacuation 11/16/2023  H/o Stroke with left hemiplasia  Obesity BMI 30.11  Left sided spastic hemiplegia  - no change with increase in baclofen last month to 20 mg tid and more sleepy with new dose  - impaired cognition; \"a little sluggish\"  - Neuro slowly reducing Baclofen and Tizanidine  Recommendations:  - Consider alternative to Trazodone due to serious reaction of seizure  - If spasticity/muscle spasms worsen, consider increasing Tizanidine  - PT/OT    JANET  Rhabdomyolysis  - On IV fluids at rate of 150/h.    HTN  - last antihypertensive med change 3/12  HLD  NICM  HFrEF  FAUSTINO  Obesity BMI " 30.11    Impairments include with activities of daily living, ambulation, endurance tolerance, gait, stair mobility, strength, transfers, functional mobility, joint mobility, activity tolerance, altered cognition.    This patient will benefit from a fast track stay at [facility] under the care of Dr. Quick for 4-5 days to receive PT, OT, SLP   pending   no IV meds or fluids except IV antibiotics,   no further scheduled procedures,   medical stability, insurance approval, and bed availability.  - The etiology and comorbidities as listed above will require 24hr availability and frequent interventions of a physician with training in rehabilitation medicine. The patient needs a rigorous acute rehab in order to have maximal functional improvement. Once admitted the patient will undergo 3 hours of PT/OT/SLP a day for at least 5 days a week. A rehab program will be initiated focusing on strengthening, endurance, safety, bathing, dressing, grooming, transfers, and ambulation. Rehab nursing will be involved to monitor bowel and bladder function, skin integrity, patient and family education and therapy carryover. A weekly meeting will be coordinated with the interdisciplinary team to maximize the patient's plan of care during the hospitalization and at discharge.    - Functional Prognosis is good    Thank you for involving [facility] in the care of       ShaistaKARLENE Cardozo  [facility] PM[facility]  [phone]  3/13/2025

## 2025-03-14 ENCOUNTER — TELEPHONE (OUTPATIENT)
Dept: CARDIOLOGY | Facility: CLINIC | Age: 40
End: 2025-03-14
Payer: COMMERCIAL

## 2025-03-14 ENCOUNTER — READMISSION MANAGEMENT (OUTPATIENT)
Dept: CALL CENTER | Facility: HOSPITAL | Age: 40
End: 2025-03-14
Payer: COMMERCIAL

## 2025-03-14 VITALS
BODY MASS INDEX: 30.01 KG/M2 | WEIGHT: 198 LBS | HEART RATE: 80 BPM | SYSTOLIC BLOOD PRESSURE: 134 MMHG | RESPIRATION RATE: 18 BRPM | OXYGEN SATURATION: 95 % | HEIGHT: 68 IN | DIASTOLIC BLOOD PRESSURE: 90 MMHG | TEMPERATURE: 97.9 F

## 2025-03-14 LAB
ALBUMIN SERPL-MCNC: 3.8 G/DL (ref 3.5–5.2)
ANION GAP SERPL CALCULATED.3IONS-SCNC: 10.3 MMOL/L (ref 5–15)
BUN SERPL-MCNC: 11 MG/DL (ref 6–20)
BUN/CREAT SERPL: 8.3 (ref 7–25)
CALCIUM SPEC-SCNC: 8.5 MG/DL (ref 8.6–10.5)
CHLORIDE SERPL-SCNC: 104 MMOL/L (ref 98–107)
CK SERPL-CCNC: 3010 U/L (ref 20–200)
CO2 SERPL-SCNC: 23.7 MMOL/L (ref 22–29)
CREAT SERPL-MCNC: 1.33 MG/DL (ref 0.76–1.27)
EGFRCR SERPLBLD CKD-EPI 2021: 68.9 ML/MIN/1.73
GLUCOSE SERPL-MCNC: 86 MG/DL (ref 65–99)
PHOSPHATE SERPL-MCNC: 3.5 MG/DL (ref 2.5–4.5)
POTASSIUM SERPL-SCNC: 3.8 MMOL/L (ref 3.5–5.2)
SODIUM SERPL-SCNC: 138 MMOL/L (ref 136–145)

## 2025-03-14 PROCEDURE — 82550 ASSAY OF CK (CPK): CPT | Performed by: INTERNAL MEDICINE

## 2025-03-14 PROCEDURE — 80069 RENAL FUNCTION PANEL: CPT | Performed by: INTERNAL MEDICINE

## 2025-03-14 RX ORDER — LEVETIRACETAM 500 MG/1
500 TABLET ORAL 2 TIMES DAILY
Qty: 60 TABLET | Refills: 0 | Status: SHIPPED | OUTPATIENT
Start: 2025-03-14

## 2025-03-14 RX ADMIN — TIZANIDINE 3 MG: 4 TABLET ORAL at 08:37

## 2025-03-14 RX ADMIN — DULOXETINE 40 MG: 20 CAPSULE, DELAYED RELEASE ORAL at 08:37

## 2025-03-14 RX ADMIN — BACLOFEN 15 MG: 10 TABLET ORAL at 08:37

## 2025-03-14 RX ADMIN — LEVETIRACETAM 500 MG: 500 TABLET, FILM COATED ORAL at 08:37

## 2025-03-14 RX ADMIN — CARVEDILOL 12.5 MG: 12.5 TABLET, FILM COATED ORAL at 08:37

## 2025-03-14 RX ADMIN — TERAZOSIN HYDROCHLORIDE 5 MG: 5 CAPSULE ORAL at 08:37

## 2025-03-14 NOTE — DISCHARGE SUMMARY
Patient Name: John Cooper Jr.  : 1985  MRN: [id number]    Date of Admission: 3/9/2025  Date of Discharge:  3/14/2025  Primary Care Physician: Nancy Mendez APRN      Chief Complaint:   Seizures      Discharge Diagnoses     Active Hospital Problems    Diagnosis  POA    **Seizure, late effect of stroke [I69.398, R56.9]  Not Applicable    Acute kidney injury [N17.9]  Yes    Rhabdomyolysis [M62.82]  Yes    New onset seizure [R56.9]  Yes    FAUSTINO (obstructive sleep apnea) [G47.33]  Yes    Primary hypertension [I10]  Yes      Resolved Hospital Problems   No resolved problems to display.        Hospital Course     Mr. Cooper is a 40 y.o. male with a history of HTN, HLD, heart failure, and FAUSTINO who underwent craniectomy at [facility] for hemorrhagic stroke who presented to [facility] initially complaining of new onset seizure.  Please see the admitting history and physical for further details.  He was found to have be postictal with rhabdomyolysis and was admitted to the ICU for further evaluation and treatment.  He was seen by neurology and neurosurgery and started on Keppra.  He seemed to tolerate Keppra well and has had no further seizure activity.  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  They did not recommend any additional imaging and recommended he follow-up with his neurosurgeon at [facility].  Neurologically he is done well and plan will be discharged home on Keppra.    For his rhabdomyolysis he was seen by nephrology and given IV fluids.  Creatinine on admission was 1.8 and has come down to 1.3.  Initial CPK was 9000.  By discharge it is decreased down to 3000.  He was seen by nephrology who is okay with him being discharged home.  He is eating and drinking well and was told to drink plenty of fluids.  Entresto was initially held awaiting renal  recovery but has now been cleared to restart at discharge.  He is to hold her spironolactone for 48 hours.      Day of Discharge     Subjective:  No new complaints, eager to discharge home.    Physical Exam:  Temp:  [97.9 °F (36.6 °C)-98.2 °F (36.8 °C)] 97.9 °F (36.6 °C)  Heart Rate:  [67-80] 80  Resp:  [18] 18  BP: (127-136)/(89-92) 134/90  Body mass index is 30.11 kg/m².  Physical Exam  Vitals and nursing note reviewed.   Constitutional:       General: He is not in acute distress.     Appearance: He is ill-appearing (Chronically).   Cardiovascular:      Rate and Rhythm: Normal rate and regular rhythm.   Pulmonary:      Effort: Pulmonary effort is normal.      Breath sounds: Normal breath sounds.   Abdominal:      General: Bowel sounds are normal.      Palpations: Abdomen is soft.      Tenderness: There is no abdominal tenderness.   Musculoskeletal:         General: No swelling.   Skin:     General: Skin is warm and dry.   Neurological:      Mental Status: He is alert.      Comments: Left-sided weakness         Consultants     Consult Orders (all) (From admission, onward)       Start     Ordered    03/12/25 1608  Inpatient Physical Medicine Rehab Consult  Once        Specialty:  Physical Medicine and Rehabilitation  Provider:  Marshall Vicente MD    03/12/25 1607    03/11/25 1522  Inpatient Nephrology Consult  Once        Specialty:  Nephrology  Provider:  Stefany Lopez MD    03/11/25 1522    03/11/25 1352  Inpatient Hospitalist Consult  Once        Specialty:  Hospitalist  Provider:  Skye Barber MD    03/11/25 1351    03/09/25 1151  Inpatient Neurology Consult General  Once        Specialty:  Neurology  Provider:  Robi Vasquez MD    03/09/25 1151    03/09/25 1150  Inpatient Neurosurgery Consult  Once        Specialty:  Neurosurgery  Provider:  Bobby Parker MD    03/09/25 1150    03/09/25 1148  Neurosurgery (on-call MD unless specified)  Once        Specialty:  Neurosurgery   Provider:  (Not yet assigned)    03/09/25 1147    03/09/25 1134  Inpatient Neurology Consult General  Once        Specialty:  Neurology  Provider:  Robi Vasquez MD    03/09/25 1133            Procedures     Imaging Results (All)       Procedure Component Value Units Date/Time    MRI Brain With & Without Contrast [[id number]] Collected: 03/10/25 0230     Updated: 03/10/25 0230    Narrative:        Patient: BUD, SILVIA  Time Out: 02:29  Exam(s): MRI HEAD W WO Contrast     EXAM:    MR Head Without and With Intravenous Contrast    CLINICAL HISTORY:     Reason for exam: new onset seizure.    TECHNIQUE:    Magnetic resonance images of the head brain without and with   intravenous contrast in multiple planes.    COMPARISON:  3 9 2025    FINDINGS:    Brain:  No mass.  No acute hemorrhage.  No restricted diffusion.  Right-  sided encephalomalacia.  Right sided wallerian degeneration.  Scattered   chronic microhemorrhages.  Right-sided pachymeningeal thickening, likely   reactive from prior surgery.    Ventricles:  No hydrocephalus.    Bones joints: Postop changes.    Sinuses:  No acute sinusitis.    Mastoid air cells:  No effusion.    Orbits:  Unremarkable.    IMPRESSION:         No acute infarct, hemorrhage, or hydrocephalus.      Impression:          Electronically signed by Sarah Ramon MD on 03-10-25 at 0229    CT Outside Films [[id number]] Resulted: 03/09/25 1145     Updated: 03/09/25 1145    Narrative:      This procedure was auto-finalized with no dictation required.    CT Outside Films [[id number]] Resulted: 03/09/25 1145     Updated: 03/09/25 1145    Narrative:      This procedure was auto-finalized with no dictation required.    CT Outside Films [[id number]] Resulted: 03/09/25 1145     Updated: 03/09/25 1145    Narrative:      This procedure was auto-finalized with no dictation required.    CT Head Without Contrast [[id number]] Collected: 03/09/25 1110     Updated: 03/09/25 1124    Narrative:      CT  HEAD WO CONTRAST-     DATE OF EXAM: 3/9/2025 10:34 AM     INDICATION: Seizure. Head trauma.     COMPARISON: None available. Correlation is made with the reports from  the prior head CTs performed at an outside facility on 2/21/2024 and  1/23/2024 with the imaging not currently available for direct  comparison.     TECHNIQUE: Multiple contiguous axial images were acquired through the  head without the intravenous administration of contrast. Reformatted  coronal and sagittal sequences were also reviewed. Radiation dose  reduction techniques were utilized, including automated exposure control  and exposure modulation based on body size.     FINDINGS:  No acute intracranial hemorrhage or mass/mass effect. Postoperative  changes from right calvarial craniotomy and pterional craniectomy with a  4 cm x 1.5 cm subcutaneous/subgaleal CSF attenuation collection  overlying the pterional craniectomy consistent with a pseudomeningocele.  Large region of encephalomalacia in the right frontal and temporal lobes  and the right basal ganglia, likely from old infarct or hemorrhage.  Associated ex vacuo dilatation of the right lateral ventricle. No  hydrocephalus. Minimal 6 mm left to right midline shift. The basilar  cisterns are patent. Gray-white matter differentiation is otherwise  grossly maintained. Limited imaging of the orbits, paranasal sinuses,  and mastoid air cells is unremarkable. No acute osseous abnormality is  identified.       Impression:         1. No acute intracranial hemorrhage or mass/mass effect.  2. Right frontal temporal and right basal ganglia encephalomalacia,  likely from old infarct or hemorrhage, with associated ex vacuo  dilatation of the right lateral ventricle and mild left to right midline  shift.  3. Postoperative changes from right calvarial craniotomy and right  pterional craniectomy with a 4 cm x 1.5 cm subcutaneous/subgaleal CSF  attenuation collection overlying the pterional craniectomy,  consistent  with a pseudomeningocele. Recommend comparison to prior imaging if  available     This report was finalized on 3/9/2025 11:21 AM by Mino Estrada MD on  Workstation: [id number]       XR Hip With or Without Pelvis 2 - 3 View Left [[id number]] Collected: 03/09/25 1025     Updated: 03/09/25 1030    Narrative:      XR HIP W OR WO PELVIS 2-3 VIEW LEFT-     INDICATIONS: Pain     TECHNIQUE: 5 VIEWS INCLUDING THE PELVIS AND LEFT HIP     COMPARISON: None available     FINDINGS:     No acute fracture, erosion, or dislocation is identified. Left hip joint  space appears preserved. Mild right hip joint space narrowing is  apparent. Nonspecific elongated sclerotic focus is apparent in the right  intertrochanteric region. Follow-up/further evaluation can be obtained  as indications persist.       Impression:         As described.           This report was finalized on 3/9/2025 10:27 AM by Dr. Maco Wright M.D on Workstation: [id number]          Pertinent Labs     Results from last 7 days   Lab Units 03/11/25  0547 03/10/25  0530 03/09/25  0910   WBC 10*3/mm3 5.03 5.09 5.03   HEMOGLOBIN g/dL 12.2* 11.7* 12.7*   PLATELETS 10*3/mm3 191 212 217     Results from last 7 days   Lab Units 03/14/25  0346 03/13/25  0503 03/12/25  0640 03/11/25  0547   SODIUM mmol/L 138 144 141 142   POTASSIUM mmol/L 3.8 4.1 4.1 4.0   CHLORIDE mmol/L 104 109* 106 108*   CO2 mmol/L 23.7 22.3 21.8* 21.9*   BUN mg/dL 11 11 10 12   CREATININE mg/dL 1.33* 1.30* 1.23 1.35*   GLUCOSE mg/dL 86 88 92 96   EGFR mL/min/1.73 68.9 70.8 75.6 67.6     Results from last 7 days   Lab Units 03/14/25  0346 03/13/25  0503 03/12/25  0640 03/09/25  0910   ALBUMIN g/dL 3.8 3.7 3.7 4.3   BILIRUBIN mg/dL  --   --  0.2 0.2   ALK PHOS U/L  --   --  77 96   AST (SGOT) U/L  --   --  33 21   ALT (SGPT) U/L  --   --  16 17     Results from last 7 days   Lab Units 03/14/25  0346 03/13/25  0503 03/12/25  0640 03/11/25  0547 03/10/25  0530 03/09/25  0910   CALCIUM mg/dL  "8.5* 8.6 8.4* 8.3*   < > 9.2   ALBUMIN g/dL 3.8 3.7 3.7  --   --  4.3   MAGNESIUM mg/dL  --   --  1.8  --   --   --    PHOSPHORUS mg/dL 3.5 3.2 3.2  --   --   --     < > = values in this interval not displayed.       Results from last 7 days   Lab Units 03/14/25  0346 03/13/25  0503 03/12/25  0640 03/11/25  1218   CK TOTAL U/L 3,010* 4,149* 6,575* 8,885*           Invalid input(s): \"LDLCALC\"          Test Results Pending at Discharge     Pending Results       None              Discharge Details        Discharge Medications        New Medications        Instructions Start Date   levETIRAcetam 500 MG tablet  Commonly known as: KEPPRA   500 mg, Oral, 2 Times Daily             Continue These Medications        Instructions Start Date   acetaminophen 325 MG tablet  Commonly known as: TYLENOL   650 mg, Every 6 Hours PRN      amLODIPine 5 MG tablet  Commonly known as: NORVASC   5 mg, Oral, Nightly      carvedilol 12.5 MG tablet  Commonly known as: COREG   12.5 mg, Oral, 2 Times Daily With Meals      doxazosin 8 MG tablet  Commonly known as: CARDURA   8 mg, Oral, 2 Times Daily      DULoxetine HCl 40 MG capsule delayed-release particles   40 mg, Daily      Entresto 24-26 MG tablet  Generic drug: sacubitril-valsartan   1 tablet, Oral, 2 Times Daily      gabapentin 300 MG capsule  Commonly known as: NEURONTIN   100 mg, Nightly      spironolactone 25 MG tablet  Commonly known as: ALDACTONE   25 mg, Oral, Daily      tiZANidine 4 MG tablet  Commonly known as: ZANAFLEX   4 mg, Oral, 3 Times Daily      traZODone 50 MG tablet  Commonly known as: DESYREL    mg, Oral, Nightly      vitamin C 250 MG tablet  Commonly known as: ASCORBIC ACID   250 mg, Oral, Daily, Take with iron               Allergies   Allergen Reactions    Banana Unknown - High Severity     no problems with anesthesia    Latex Angioedema    Other Swelling     BANANAS, STATES LIPS BEGAN TO SWELL AND TINGLE       Discharge Disposition:  Home or Self " Care      Discharge Diet:  Diet Order   Procedures    Diet: Regular/House; Fluid Consistency: Thin (IDDSI 0)       Discharge Activity:   Activity Instructions       Activity as Tolerated              CODE STATUS:    Code Status and Medical Interventions: CPR (Attempt to Resuscitate); Full Support   Ordered at: 03/09/25 1151     Code Status (Patient has no pulse and is not breathing):    CPR (Attempt to Resuscitate)     Medical Interventions (Patient has pulse or is breathing):    Full Support     Level Of Support Discussed With:    Patient       Future Appointments   Date Time Provider Department Center   3/27/2025  2:45 PM Nancy Mendez APRN [facility] E[facility]PT [facility]   4/22/2025 10:20 AM Rosa Rocha MD [facility] ESPT [facility]   4/22/2025 11:20 AM Aleida Ga MD [facility] LCGKR [facility]   2/23/2026 11:00 AM Robi Mehta MD [facility] SLP [facility] None     Additional Instructions for the Follow-ups that You Need to Schedule       Discharge Follow-up with PCP   As directed       Currently Documented PCP:    Nancy Mendez APRN    PCP Phone Number:    [phone]     Follow Up Details: 1 to 2 weeks (or sooner if problems)               Follow-up Information       Nancy Mendez APRN .    Specialty: Nurse Practitioner  Why: 1 to 2 weeks (or sooner if problems)  Contact information:  [address]  [place] [address]  [phone]                             Additional Instructions for the Follow-ups that You Need to Schedule       Discharge Follow-up with PCP   As directed       Currently Documented PCP:    Nancy Mendez APRN    PCP Phone Number:    [phone]     Follow Up Details: 1 to 2 weeks (or sooner if problems)            Time Spent on Discharge:  Greater than 30 minutes      Rei Lee MD  [facility]  03/14/25  11:42 EDT

## 2025-03-14 NOTE — PROGRESS NOTES
[facility] Progress Note      Patient Name: John Cooper Jr.  : 1985  MRN: [id number]  Primary Care Physician:  Nancy Mendez APRN  Date of admission: 3/9/2025    Subjective     Interval History:   Follow up elevated CK and JANET.     Patient lying in bed  Denies any myalgias  Tolerating oral intake without nausea or emesis or abdominal discomfort    Family members at bedside  Review of Systems:   As noted above    Objective     Vitals:   Temp:  [97.9 °F (36.6 °C)-98.4 °F (36.9 °C)] 97.9 °F (36.6 °C)  Heart Rate:  [67-70] 67  Resp:  [18-20] 18  BP: (115-136)/(70-92) 136/92    Intake/Output Summary (Last 24 hours) at 3/14/2025 1127  Last data filed at 3/14/2025 0800  Gross per 24 hour   Intake 1740 ml   Output 1300 ml   Net 440 ml       Physical Exam:    General Appearance: alert, no acute distress   Skin: warm and dry  HEENT: oral mucosa normal, nonicteric sclera  Neck: supple, no JVD  Lungs: clear to auscultation.   Heart: RRR, normal S1 and S2  Abdomen: soft, nontender, nondistended. +bs  : no palpable bladder  Extremities: no edema. Muscles soft.   Neuro: alert left-sided hemiparesis    Scheduled Meds:     amLODIPine, 7.5 mg, Oral, Nightly  vitamin C, 250 mg, Oral, Daily  baclofen, 15 mg, Oral, TID  carvedilol, 12.5 mg, Oral, BID With Meals  DULoxetine, 40 mg, Oral, Daily  gabapentin, 100 mg, Oral, Nightly  levETIRAcetam, 500 mg, Oral, BID  [Held by provider] spironolactone, 25 mg, Oral, Daily  terazosin, 5 mg, Oral, Q12H  tiZANidine, 3 mg, Oral, TID  traZODone, 50 mg, Oral, Nightly      IV Meds:          Results Reviewed:   I have personally reviewed the results from the time of this admission to 3/14/2025 11:27 EDT     Results from last 7 days   Lab Units 25  0346 25  0503 25  0640 03/10/25  0530 25  0910   SODIUM mmol/L 138 144 141   < > 138   POTASSIUM mmol/L 3.8 4.1 4.1   < > 4.8   CHLORIDE mmol/L 104 109* 106   < > 101   CO2 mmol/L 23.7 22.3  21.8*   < > 19.0*   BUN mg/dL 11 11 10   < > 21*   CREATININE mg/dL 1.33* 1.30* 1.23   < > 1.78*   CALCIUM mg/dL 8.5* 8.6 8.4*   < > 9.2   BILIRUBIN mg/dL  --   --  0.2  --  0.2   ALK PHOS U/L  --   --  77  --  96   ALT (SGPT) U/L  --   --  16  --  17   AST (SGOT) U/L  --   --  33  --  21   GLUCOSE mg/dL 86 88 92   < > 156*    < > = values in this interval not displayed.       Estimated Creatinine Clearance: 79.6 mL/min (A) (by C-G formula based on SCr of 1.33 mg/dL (H)).    Results from last 7 days   Lab Units 03/14/25  0346 03/13/25  0503 03/12/25  0640   MAGNESIUM mg/dL  --   --  1.8   PHOSPHORUS mg/dL 3.5 3.2 3.2             Results from last 7 days   Lab Units 03/11/25  0547 03/10/25  0530 03/09/25  0910   WBC 10*3/mm3 5.03 5.09 5.03   HEMOGLOBIN g/dL 12.2* 11.7* 12.7*   PLATELETS 10*3/mm3 191 212 217             Assessment / Plan     ASSESSMENT:    -JANET on ckd 3 with baseline creatinine 1.5-1.7 , likely multifactorial including rhabdomyolysis with elevated CPK after new onset seizure in the setting of sacubitril valsartan.  Urine with small blood, no red cells.  Responded to medical management    - New onset seizure.  Neurology evaluation.  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      -  Prior intracerebral right basal ganglia hemorrhage 11/16/2023 status post craniectomy with subsequent cranioplasty.  Left side spastic hemiplegia.    -  Prior history of heart failure reduced ejection fraction, EF on most recent echo 1/28/2025 normal.    -.  Hypertension. Resume home regimen would suggest to hold diuretics for 48 hours.     - Obstructive sleep apnea    PLAN:    -Patient renal function remained stable his CPKs continue to improve with fluid intake.  -Patient can be safely discharged to continue out patient care   -Can resume Entresto , unlikely to have a negative impact on renal recovery due to the low-dose of valsartan 26 mg  -I would suggest to hold  spironolactone for 48 hours.  While patient increases Fluid intake and his CPK is normalized    Updated family members at bedside    Discussed with Dr. Lee    Thank you for allowing us to precipitate in this patient care      Joe Romero MD  03/14/25  11:27 EDT    [facility]  [phone]    Please note that portions of this note were completed with a voice recognition program.

## 2025-03-14 NOTE — PLAN OF CARE
Goal Outcome Evaluation:         No acute issues overnight. Call light within reach. Plan of care ongoing. Parents at bedside. A/o x 4. CPAP.

## 2025-03-14 NOTE — TELEPHONE ENCOUNTER
Caller: JEFF FARRELL    Relationship to patient: Mother    Best call back number:  [phone]    Patient is needing: PATIENT WAS RELEASED FROM HOSPITAL TODAY 3.14.25. NO NOTE ON WHEN TO SCHEDULE A HOSPITAL FOLLOW UP. PATIENTS MOTHER CHECKING TO SEE IF HE NEEDS TO BE SEEN.

## 2025-03-14 NOTE — OUTREACH NOTE
Prep Survey      Flowsheet Row Responses   [facility] facility patient discharged from? [facility]   Is LACE score < 7 ? No   Eligibility [facility]   Date of Admission 03/09/25   Date of Discharge 03/14/25   Discharge Disposition Home or Self Care   Discharge diagnosis Seizure, late effect of stroke   Does the patient have one of the following disease processes/diagnoses(primary or secondary)? Stroke   Does the patient have Home health ordered? No   Is there a DME ordered? No   Prep survey completed? Yes            JAQUAN A - Registered Nurse

## 2025-03-14 NOTE — CASE MANAGEMENT/SOCIAL WORK
Discharge Planning Assessment  [facility]     Patient Name: John Cooper Jr.  MRN: [id number]  Today's Date: 3/14/2025    Admit Date: 3/9/2025    Plan: Home with family to transport.   Discharge Needs Assessment       Row Name 03/14/25 1035       Living Environment    People in Home parent(s)    Current Living Arrangements home    Potentially Unsafe Housing Conditions none    In the past 12 months has the electric, gas, oil, or water company threatened to shut off services in your home? No    Primary Care Provided by parent(s)    Provides Primary Care For no one    Family Caregiver if Needed parent(s)    Quality of Family Relationships helpful;involved;supportive    Able to Return to Prior Arrangements yes       Resource/Environmental Concerns    Resource/Environmental Concerns none    Transportation Concerns none       Transportation Needs    In the past 12 months, has lack of transportation kept you from medical appointments or from getting medications? no    In the past 12 months, has lack of transportation kept you from meetings, work, or from getting things needed for daily living? No       Food Insecurity    Within the past 12 months, you worried that your food would run out before you got the money to buy more. Never true    Within the past 12 months, the food you bought just didn't last and you didn't have money to get more. Never true       Transition Planning    Patient/Family Anticipates Transition to home with family    Patient/Family Anticipated Services at Transition outpatient care    Transportation Anticipated family or friend will provide       Discharge Needs Assessment    Readmission Within the Last 30 Days no previous admission in last 30 days    Equipment Currently Used at Home cpap;hospital bed    Concerns to be Addressed discharge planning    Do you want help finding or keeping work or a job? I do not need or want help    Do you want help with school or training? For example, starting or  completing job training or getting a high school diploma, GED or equivalent No                   Discharge Plan       Row Name 03/14/25 1036       Plan    Plan Home with family to transport.    Roadmap to Recovery Yes    Patient/Family in Agreement with Plan yes    Plan Comments Spoke with patient and father at bedside. Introduced self and explained CCP role. Verified face sheet and local pharmacy is [employer] choice to enroll in M2B. Patient currently lives with parents in a s/s home with a ramp to enter. Father states prior to patient's discharge from [facility] patient lived alone and was IADL. Patient currently uses a w/c for transport and is unable to transfer by himself. Father states that he and patient's mother provide primary care. Father states that plan is for patient to return home with family to transport and continue outpatient therapy.                    Expected Discharge Date and Time       Expected Discharge Date Expected Discharge Time    Mar 14, 2025            Demographic Summary       Row Name 03/14/25 1018       General Information    Admission Type inpatient    Referral Source admission list    Reason for Consult discharge planning    Preferred Language English                   Functional Status       Row Name 03/14/25 1032       Functional Status    Usual Activity Tolerance fair    Current Activity Tolerance fair       Physical Activity    On average, how many days per week do you engage in moderate to strenuous exercise (like a brisk walk)? 0 days    On average, how many minutes do you engage in exercise at this level? 0 min    Number of minutes of exercise per week 0       Functional Status, IADL    Medications assistive person    Meal Preparation assistive person    Housekeeping assistive person    Laundry assistive person    Shopping completely dependent    If for any reason you need help with day-to-day activities such as bathing, preparing meals, shopping, managing finances, etc., do you  get the help you need? I get all the help I need       Mental Status    General Appearance WDL WDL                   Psychosocial       Row Name 03/14/25 1033       Values/Beliefs    Spiritual, Cultural Beliefs, Tenriism Practices, Values that Affect Care no       Behavior WDL    Behavior WDL WDL       Emotion Mood WDL    Emotion/Mood/Affect WDL WDL       Mental Health    Little interest or pleasure in doing things Several days    Feeling down, depressed, or hopeless Several days       Stress    Do you feel stress - tense, restless, nervous, or anxious, or unable to sleep at night because your mind is troubled all the time - these days? Only a littl       Coping/Stress    Sources of Support parent(s)       Developmental Stage (Eriksson's Stages of Development)    Developmental Stage Stage 7 (35-65 years/Middle Adulthood) Generativity vs. Stagnation                   Abuse/Neglect    No documentation.                  Legal       Row Name 03/14/25 1035       Financial Resource Strain    How hard is it for you to pay for the very basics like food, housing, medical care, and heating? Not hard       Financial/Legal    Financial/Environmental Concerns none       Legal    Criminal Activity/Legal Involvement none                   Substance Abuse    No documentation.                  Patient Forms    No documentation.                     Aleida Caballero RN

## 2025-03-14 NOTE — PAYOR COMM NOTE
"John Cooper Jr. (40 y.o. Male)          DC SUMMARY FOR [id number]         Date of Birth   1985    Social Security Number       Address   [address] [id number]    Home Phone   [phone]    MRN   [id number]       Mandaen   Denominational    Marital Status   Single                            Admission Date   3/9/2025    Admission Type   Emergency    Admitting Provider   Mel Lopez MD    Attending Provider       Department, Room/Bed   [address], [address]       Discharge Date   3/14/2025    Discharge Disposition   Home or Self Care    Discharge Destination                                 Attending Provider: (none)   Allergies: Banana, Latex, Other    Isolation: None   Infection: None   Code Status: CPR    Ht: 172.7 cm (68\")   Wt: 89.8 kg (197 lb 15.9 oz)    Admission Cmt: None   Principal Problem: Seizure, late effect of stroke [I69.398,R56.9]                   Active Insurance as of 3/9/2025       Primary Coverage       Payor Plan Insurance Group Employer/Plan Group    [employer] [place] EXCHANGE [employer] EXCHANGE [id number]       Payor Plan Address Payor Plan Phone Number Payor Plan Fax Number Effective Dates    [address] [phone]  2025 - None Entered    [facility] [id number]         Subscriber Name Subscriber Birth Date Member ID       JOHN COOPER JR. 1985 [id number]                     Emergency Contacts        (Rel.) Home Phone Work Phone Mobile Phone    JEFF COOPER (Mother) [phone]    BOYERJORGE (Friend) -- -- [phone]                 Discharge Summary        Rei Lee MD at 25 1141              Patient Name: John Cooper Jr.  : 1985  MRN: [id number]    Date of Admission: 3/9/2025  Date of Discharge:  3/14/2025  Primary Care Physician: Nancy Mendez, APRN      Chief Complaint:   Seizures      Discharge Diagnoses     Active Hospital Problems    Diagnosis  POA    " **Seizure, late effect of stroke [I69.398, R56.9]  Not Applicable    Acute kidney injury [N17.9]  Yes    Rhabdomyolysis [M62.82]  Yes    New onset seizure [R56.9]  Yes    FAUSTINO (obstructive sleep apnea) [G47.33]  Yes    Primary hypertension [I10]  Yes      Resolved Hospital Problems   No resolved problems to display.        Hospital Course     Mr. Cooper is a 40 y.o. male with a history of HTN, HLD, heart failure, and FAUSTINO who underwent craniectomy at [facility] for hemorrhagic stroke who presented to [facility] initially complaining of new onset seizure.  Please see the admitting history and physical for further details.  He was found to have be postictal with rhabdomyolysis and was admitted to the ICU for further evaluation and treatment.  He was seen by neurology and neurosurgery and started on Keppra.  He seemed to tolerate Keppra well and has had no further seizure activity.  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  They did not recommend any additional imaging and recommended he follow-up with his neurosurgeon at [facility].  Neurologically he is done well and plan will be discharged home on Keppra.    For his rhabdomyolysis he was seen by nephrology and given IV fluids.  Creatinine on admission was 1.8 and has come down to 1.3.  Initial CPK was 9000.  By discharge it is decreased down to 3000.  He was seen by nephrology who is okay with him being discharged home.  He is eating and drinking well and was told to drink plenty of fluids.  Entresto was initially held awaiting renal recovery but has now been cleared to restart at discharge.  He is to hold her spironolactone for 48 hours.      Day of Discharge     Subjective:  No new complaints, eager to discharge home.    Physical Exam:  Temp:  [97.9 °F (36.6 °C)-98.2 °F (36.8 °C)] 97.9 °F (36.6 °C)  Heart Rate:   [67-80] 80  Resp:  [18] 18  BP: (127-136)/(89-92) 134/90  Body mass index is 30.11 kg/m².  Physical Exam  Vitals and nursing note reviewed.   Constitutional:       General: He is not in acute distress.     Appearance: He is ill-appearing (Chronically).   Cardiovascular:      Rate and Rhythm: Normal rate and regular rhythm.   Pulmonary:      Effort: Pulmonary effort is normal.      Breath sounds: Normal breath sounds.   Abdominal:      General: Bowel sounds are normal.      Palpations: Abdomen is soft.      Tenderness: There is no abdominal tenderness.   Musculoskeletal:         General: No swelling.   Skin:     General: Skin is warm and dry.   Neurological:      Mental Status: He is alert.      Comments: Left-sided weakness         Consultants     Consult Orders (all) (From admission, onward)       Start     Ordered    03/12/25 1608  Inpatient Physical Medicine Rehab Consult  Once        Specialty:  Physical Medicine and Rehabilitation  Provider:  Marshall Vicente MD    03/12/25 1607    03/11/25 1522  Inpatient Nephrology Consult  Once        Specialty:  Nephrology  Provider:  Stefany Lopez MD    03/11/25 1522    03/11/25 1352  Inpatient Hospitalist Consult  Once        Specialty:  Hospitalist  Provider:  Skye Barber MD    03/11/25 1351    03/09/25 1151  Inpatient Neurology Consult General  Once        Specialty:  Neurology  Provider:  Robi Vasquez MD    03/09/25 1151    03/09/25 1150  Inpatient Neurosurgery Consult  Once        Specialty:  Neurosurgery  Provider:  Bobby Parker MD    03/09/25 1150    03/09/25 1148  Neurosurgery (on-call MD unless specified)  Once        Specialty:  Neurosurgery  Provider:  (Not yet assigned)    03/09/25 1147    03/09/25 1134  Inpatient Neurology Consult General  Once        Specialty:  Neurology  Provider:  Robi Vasquez MD    03/09/25 1133            Procedures     Imaging Results (All)       Procedure Component Value Units  Date/Time    MRI Brain With & Without Contrast [[id number]] Collected: 03/10/25 0230     Updated: 03/10/25 0230    Narrative:        Patient: BUD, SILVIA  Time Out: 02:29  Exam(s): MRI HEAD W WO Contrast     EXAM:    MR Head Without and With Intravenous Contrast    CLINICAL HISTORY:     Reason for exam: new onset seizure.    TECHNIQUE:    Magnetic resonance images of the head brain without and with   intravenous contrast in multiple planes.    COMPARISON:  3 9 2025    FINDINGS:    Brain:  No mass.  No acute hemorrhage.  No restricted diffusion.  Right-  sided encephalomalacia.  Right sided wallerian degeneration.  Scattered   chronic microhemorrhages.  Right-sided pachymeningeal thickening, likely   reactive from prior surgery.    Ventricles:  No hydrocephalus.    Bones joints: Postop changes.    Sinuses:  No acute sinusitis.    Mastoid air cells:  No effusion.    Orbits:  Unremarkable.    IMPRESSION:         No acute infarct, hemorrhage, or hydrocephalus.      Impression:          Electronically signed by Sarah Ramon MD on 03-10-25 at 0229    CT Outside Films [[id number]] Resulted: 03/09/25 1145     Updated: 03/09/25 1145    Narrative:      This procedure was auto-finalized with no dictation required.    CT Outside Films [[id number]] Resulted: 03/09/25 1145     Updated: 03/09/25 1145    Narrative:      This procedure was auto-finalized with no dictation required.    CT Outside Films [[id number]] Resulted: 03/09/25 1145     Updated: 03/09/25 1145    Narrative:      This procedure was auto-finalized with no dictation required.    CT Head Without Contrast [[id number]] Collected: 03/09/25 1110     Updated: 03/09/25 1124    Narrative:      CT HEAD WO CONTRAST-     DATE OF EXAM: 3/9/2025 10:34 AM     INDICATION: Seizure. Head trauma.     COMPARISON: None available. Correlation is made with the reports from  the prior head CTs performed at an outside facility on 2/21/2024 and  1/23/2024 with the imaging not currently  available for direct  comparison.     TECHNIQUE: Multiple contiguous axial images were acquired through the  head without the intravenous administration of contrast. Reformatted  coronal and sagittal sequences were also reviewed. Radiation dose  reduction techniques were utilized, including automated exposure control  and exposure modulation based on body size.     FINDINGS:  No acute intracranial hemorrhage or mass/mass effect. Postoperative  changes from right calvarial craniotomy and pterional craniectomy with a  4 cm x 1.5 cm subcutaneous/subgaleal CSF attenuation collection  overlying the pterional craniectomy consistent with a pseudomeningocele.  Large region of encephalomalacia in the right frontal and temporal lobes  and the right basal ganglia, likely from old infarct or hemorrhage.  Associated ex vacuo dilatation of the right lateral ventricle. No  hydrocephalus. Minimal 6 mm left to right midline shift. The basilar  cisterns are patent. Gray-white matter differentiation is otherwise  grossly maintained. Limited imaging of the orbits, paranasal sinuses,  and mastoid air cells is unremarkable. No acute osseous abnormality is  identified.       Impression:         1. No acute intracranial hemorrhage or mass/mass effect.  2. Right frontal temporal and right basal ganglia encephalomalacia,  likely from old infarct or hemorrhage, with associated ex vacuo  dilatation of the right lateral ventricle and mild left to right midline  shift.  3. Postoperative changes from right calvarial craniotomy and right  pterional craniectomy with a 4 cm x 1.5 cm subcutaneous/subgaleal CSF  attenuation collection overlying the pterional craniectomy, consistent  with a pseudomeningocele. Recommend comparison to prior imaging if  available     This report was finalized on 3/9/2025 11:21 AM by Mino Estrada MD on  Workstation: [id number]       XR Hip With or Without Pelvis 2 - 3 View Left [[id number]] Collected: 03/09/25 1021      Updated: 03/09/25 1030    Narrative:      XR HIP W OR WO PELVIS 2-3 VIEW LEFT-     INDICATIONS: Pain     TECHNIQUE: 5 VIEWS INCLUDING THE PELVIS AND LEFT HIP     COMPARISON: None available     FINDINGS:     No acute fracture, erosion, or dislocation is identified. Left hip joint  space appears preserved. Mild right hip joint space narrowing is  apparent. Nonspecific elongated sclerotic focus is apparent in the right  intertrochanteric region. Follow-up/further evaluation can be obtained  as indications persist.       Impression:         As described.           This report was finalized on 3/9/2025 10:27 AM by Dr. Maco Wright M.D on Workstation: [id number]          Pertinent Labs     Results from last 7 days   Lab Units 03/11/25  0547 03/10/25  0530 03/09/25  0910   WBC 10*3/mm3 5.03 5.09 5.03   HEMOGLOBIN g/dL 12.2* 11.7* 12.7*   PLATELETS 10*3/mm3 191 212 217     Results from last 7 days   Lab Units 03/14/25  0346 03/13/25  0503 03/12/25  0640 03/11/25  0547   SODIUM mmol/L 138 144 141 142   POTASSIUM mmol/L 3.8 4.1 4.1 4.0   CHLORIDE mmol/L 104 109* 106 108*   CO2 mmol/L 23.7 22.3 21.8* 21.9*   BUN mg/dL 11 11 10 12   CREATININE mg/dL 1.33* 1.30* 1.23 1.35*   GLUCOSE mg/dL 86 88 92 96   EGFR mL/min/1.73 68.9 70.8 75.6 67.6     Results from last 7 days   Lab Units 03/14/25  0346 03/13/25  0503 03/12/25  0640 03/09/25  0910   ALBUMIN g/dL 3.8 3.7 3.7 4.3   BILIRUBIN mg/dL  --   --  0.2 0.2   ALK PHOS U/L  --   --  77 96   AST (SGOT) U/L  --   --  33 21   ALT (SGPT) U/L  --   --  16 17     Results from last 7 days   Lab Units 03/14/25  0346 03/13/25  0503 03/12/25  0640 03/11/25  0547 03/10/25  0530 03/09/25  0910   CALCIUM mg/dL 8.5* 8.6 8.4* 8.3*   < > 9.2   ALBUMIN g/dL 3.8 3.7 3.7  --   --  4.3   MAGNESIUM mg/dL  --   --  1.8  --   --   --    PHOSPHORUS mg/dL 3.5 3.2 3.2  --   --   --     < > = values in this interval not displayed.       Results from last 7 days   Lab Units 03/14/25  0346 03/13/25  050  "03/12/25  0640 03/11/25  1218   CK TOTAL U/L 3,010* 4,149* 6,575* 8,885*           Invalid input(s): \"LDLCALC\"          Test Results Pending at Discharge     Pending Results       None              Discharge Details        Discharge Medications        New Medications        Instructions Start Date   levETIRAcetam 500 MG tablet  Commonly known as: KEPPRA   500 mg, Oral, 2 Times Daily             Continue These Medications        Instructions Start Date   acetaminophen 325 MG tablet  Commonly known as: TYLENOL   650 mg, Every 6 Hours PRN      amLODIPine 5 MG tablet  Commonly known as: NORVASC   5 mg, Oral, Nightly      carvedilol 12.5 MG tablet  Commonly known as: COREG   12.5 mg, Oral, 2 Times Daily With Meals      doxazosin 8 MG tablet  Commonly known as: CARDURA   8 mg, Oral, 2 Times Daily      DULoxetine HCl 40 MG capsule delayed-release particles   40 mg, Daily      Entresto 24-26 MG tablet  Generic drug: sacubitril-valsartan   1 tablet, Oral, 2 Times Daily      gabapentin 300 MG capsule  Commonly known as: NEURONTIN   100 mg, Nightly      spironolactone 25 MG tablet  Commonly known as: ALDACTONE   25 mg, Oral, Daily      tiZANidine 4 MG tablet  Commonly known as: ZANAFLEX   4 mg, Oral, 3 Times Daily      traZODone 50 MG tablet  Commonly known as: DESYREL    mg, Oral, Nightly      vitamin C 250 MG tablet  Commonly known as: ASCORBIC ACID   250 mg, Oral, Daily, Take with iron               Allergies   Allergen Reactions    Banana Unknown - High Severity     no problems with anesthesia    Latex Angioedema    Other Swelling     BANANAS, STATES LIPS BEGAN TO SWELL AND TINGLE       Discharge Disposition:  Home or Self Care      Discharge Diet:  Diet Order   Procedures    Diet: Regular/House; Fluid Consistency: Thin (IDDSI 0)       Discharge Activity:   Activity Instructions       Activity as Tolerated              CODE STATUS:    Code Status and Medical Interventions: CPR (Attempt to Resuscitate); Full Support "   Ordered at: 03/09/25 1151     Code Status (Patient has no pulse and is not breathing):    CPR (Attempt to Resuscitate)     Medical Interventions (Patient has pulse or is breathing):    Full Support     Level Of Support Discussed With:    Patient       Future Appointments   Date Time Provider Department Center   3/27/2025  2:45 PM Nancy Mendez APRN [facility] EASPT [facility]   4/22/2025 10:20 AM Rosa Rocha MD [facility] ESPT [facility]   4/22/2025 11:20 AM Aleida Ga MD [facility] L[facility]   2/23/2026 11:00 AM Robi Mehta MD [facility] SLP [facility] None     Additional Instructions for the Follow-ups that You Need to Schedule       Discharge Follow-up with PCP   As directed       Currently Documented PCP:    Nancy Mendez APRN    PCP Phone Number:    [phone]     Follow Up Details: 1 to 2 weeks (or sooner if problems)               Follow-up Information       Nancy Mendez APRN .    Specialty: Nurse Practitioner  Why: 1 to 2 weeks (or sooner if problems)  Contact information:  [address]  [place] [address]  [phone]                             Additional Instructions for the Follow-ups that You Need to Schedule       Discharge Follow-up with PCP   As directed       Currently Documented PCP:    Nancy Mendez APRN    PCP Phone Number:    [phone]     Follow Up Details: 1 to 2 weeks (or sooner if problems)            Time Spent on Discharge:  Greater than 30 minutes      Rei Lee MD  [facility]  03/14/25  11:42 EDT    Electronically signed by Rei Lee MD at 03/14/25 1656

## 2025-03-14 NOTE — CASE MANAGEMENT/SOCIAL WORK
Case Management Discharge Note      Final Note: Home with family to transport         Selected Continued Care - Discharged on 3/14/2025 Admission date: 3/9/2025 - Discharge disposition: Home or Self Care      Destination    No services have been selected for the patient.                Durable Medical Equipment    No services have been selected for the patient.                Dialysis/Infusion    No services have been selected for the patient.                Home Medical Care    No services have been selected for the patient.                Therapy    No services have been selected for the patient.                Community Resources    No services have been selected for the patient.                Community & DME    No services have been selected for the patient.                         Final Discharge Disposition Code: 01 - home or self-care

## 2025-03-17 ENCOUNTER — TRANSITIONAL CARE MANAGEMENT TELEPHONE ENCOUNTER (OUTPATIENT)
Dept: CALL CENTER | Facility: HOSPITAL | Age: 40
End: 2025-03-17
Payer: COMMERCIAL

## 2025-03-17 NOTE — OUTREACH NOTE
Call Center TCM Note      Flowsheet Row Responses   [facility] patient discharged from? [facility]   Does the patient have one of the following disease processes/diagnoses(primary or secondary)? Stroke   TCM attempt successful? Yes   Call start time 1213   Call end time 1216   Discharge diagnosis Seizure, late effect of stroke   Meds reviewed with patient/caregiver? Yes   Is the patient having any side effects they believe may be caused by any medication additions or changes? No   Does the patient have all medications ordered at discharge? Yes   Is the patient taking all medications as directed (includes completed medication regime)? Yes   Comments Hospital d/c f/u 3/27/25 @2:45pm   Does the patient have an appointment with their PCP within 7-14 days of discharge? Yes   Has home health visited the patient within 72 hours of discharge? N/A   DME comments pt uses a wheelchair   Psychosocial issues? No   Does the patient require any assistance with activities of daily living such as eating, bathing, dressing, walking, etc.? Yes  [same assistance as before this hospital stay]   Does the patient have any residual symptoms from stroke/TIA? No   What is the patient's perception of their health status since discharge? Same   Nursing interventions Nurse provided patient education   Is the patient/caregiver able to teach back the hierarchy of who to call/visit for symptoms/problems? PCP, Specialist, Home health nurse, Urgent Care, ED, 911 Yes   Is the patient able to teach back FAST for Stroke? B alance: Watch for sudden loss of balance, E yes: Check for vision loss, S peech: Listen for slurred speech, T jami: Call 9-1-1 right away, F ace: Look for an uneven smile, A rm: Check if one arm is weak   TCM call completed? Yes   Call end time 1216   Would this patient benefit from a Referral to Amb Social Work? No   Is the patient interested in additional calls from an ambulatory ? No            Cristina Quiroz,  RN    3/17/2025, 12:18 EDT

## 2025-03-17 NOTE — TELEPHONE ENCOUNTER
This patient was seen in the hospital for having a seizure - new dx.  Do you want to see him in the office to follow up since ER visit?  Or normal follow up ok?  Scheduled with  on 4/22/25 at 1120.    Vinnie

## 2025-03-19 ENCOUNTER — TELEPHONE (OUTPATIENT)
Dept: CARDIOLOGY | Age: 40
End: 2025-03-19

## 2025-03-19 NOTE — TELEPHONE ENCOUNTER
3/19/25 Patient rescheduled to 4/30/25 with Isabel. No availability with Dr. Ga until the end of June.

## 2025-03-19 NOTE — TELEPHONE ENCOUNTER
Caller: JEFF FARRELL    Relationship to patient: Mother    Best call back number:  [phone]    Patient is needing: PATIENT SCHEDULED ON 4.22.25 , PATIENT WAS RELEASED FROM THE HOSPITAL ON SUNDAY. PATIENT HAS A NEUROLOGY APPOINTMENT AROUND THE SAME TIME. UNABLE TO FIND ANY FOLLOW UPS UNTIL THE END OF MAY.

## 2025-03-25 ENCOUNTER — READMISSION MANAGEMENT (OUTPATIENT)
Dept: CALL CENTER | Facility: HOSPITAL | Age: 40
End: 2025-03-25
Payer: COMMERCIAL

## 2025-03-25 NOTE — OUTREACH NOTE
Stroke Week 2 Survey      Flowsheet Row Responses   [facility] [place] [facility] patient discharged from? [facility]   Does the patient have one of the following disease processes/diagnoses(primary or secondary)? Stroke   Week 2 attempt successful? No   Unsuccessful attempts Attempt 1            HEBERT CROWLEY - Registered Nurse

## 2025-03-27 ENCOUNTER — OFFICE VISIT (OUTPATIENT)
Dept: FAMILY MEDICINE CLINIC | Facility: CLINIC | Age: 40
End: 2025-03-27
Payer: COMMERCIAL

## 2025-03-27 VITALS
DIASTOLIC BLOOD PRESSURE: 68 MMHG | TEMPERATURE: 97 F | HEIGHT: 68 IN | BODY MASS INDEX: 29.86 KG/M2 | RESPIRATION RATE: 14 BRPM | SYSTOLIC BLOOD PRESSURE: 110 MMHG | WEIGHT: 197 LBS | HEART RATE: 72 BPM | OXYGEN SATURATION: 98 %

## 2025-03-27 DIAGNOSIS — F51.04 PSYCHOPHYSIOLOGICAL INSOMNIA: Chronic | ICD-10-CM

## 2025-03-27 DIAGNOSIS — Z09 HOSPITAL DISCHARGE FOLLOW-UP: Primary | ICD-10-CM

## 2025-03-27 DIAGNOSIS — I10 PRIMARY HYPERTENSION: ICD-10-CM

## 2025-03-27 DIAGNOSIS — R56.9 SEIZURES: ICD-10-CM

## 2025-03-27 DIAGNOSIS — Z99.3 WHEELCHAIR DEPENDENCE: ICD-10-CM

## 2025-03-27 RX ORDER — GABAPENTIN 100 MG/1
100 CAPSULE ORAL 3 TIMES DAILY
COMMUNITY

## 2025-03-27 RX ORDER — TRAZODONE HYDROCHLORIDE 50 MG/1
50-100 TABLET ORAL NIGHTLY
Qty: 180 TABLET | Refills: 1 | Status: SHIPPED | OUTPATIENT
Start: 2025-03-27

## 2025-03-27 RX ORDER — MULTIVIT WITH MINERALS/LUTEIN
250 TABLET ORAL DAILY
Qty: 90 TABLET | Refills: 1 | Status: SHIPPED | OUTPATIENT
Start: 2025-03-27

## 2025-03-27 RX ORDER — AMLODIPINE BESYLATE 5 MG/1
5 TABLET ORAL NIGHTLY
Qty: 90 TABLET | Refills: 1 | Status: SHIPPED | OUTPATIENT
Start: 2025-03-27

## 2025-03-27 RX ORDER — CARVEDILOL 12.5 MG/1
12.5 TABLET ORAL 2 TIMES DAILY WITH MEALS
Qty: 180 TABLET | Refills: 1 | Status: SHIPPED | OUTPATIENT
Start: 2025-03-27

## 2025-03-27 RX ORDER — LEVETIRACETAM 500 MG/1
500 TABLET ORAL 2 TIMES DAILY
Qty: 60 TABLET | Refills: 0 | Status: SHIPPED | OUTPATIENT
Start: 2025-03-27

## 2025-03-27 RX ORDER — DOXAZOSIN 8 MG/1
8 TABLET ORAL 2 TIMES DAILY
Qty: 180 TABLET | Refills: 1 | Status: SHIPPED | OUTPATIENT
Start: 2025-03-27

## 2025-03-27 RX ORDER — SPIRONOLACTONE 25 MG/1
25 TABLET ORAL DAILY
Qty: 90 TABLET | Refills: 1 | Status: SHIPPED | OUTPATIENT
Start: 2025-03-27

## 2025-03-27 NOTE — PROGRESS NOTES
Chief Complaint  Hospital Follow Up Visit    Candace Cooper Jr. presents to [facility]  History of Present Illness    History of Present Illness  The patient presents for evaluation of seizure, sleep disturbance, elevated creatinine kinase, and anemia.    History is reported by patient, mom, dad as patient does not recollect all the details of recent hospitalization.  He experienced a seizure lasting approximately 12 minutes, which necessitated hospitalization. The etiology of the seizure was attributed to scar tissue in the brain, a condition he was previously unaware of. He was informed that alcohol or tobacco use could potentially trigger seizures. The possibility of baclofen as a contributing factor was also considered, so this has been discontinued. He does not recall the seizure or any preceding events. He has been prescribed Keppra, which initially caused significant side effects, including lethargy and withdrawal, but these have since improved. His postictal state has improved by approximately 70 percent, although his full personality has not yet returned.     He reports poor sleep quality, often waking up 2 to 3 times per night due to discomfort in his legs and lower back. He typically wakes up around 3:30 AM and remains awake for the rest of the morning. Despite attending therapy, his activity level has decreased since the seizure. He takes gabapentin 100 mg at 2:00 PM, which was reduced from 300 mg at night due to morning grogginess. He also takes tizanidine, carvedilol, amlodipine, Entresto, doxazosin, and duloxetine in the morning. He takes trazodone 75 mg for sleep, but it has not been effective recently. He occasionally experiences pain after therapy and takes a 2-hour nap in the afternoon. He uses a CPAP machine and reports feeling unmotivated. He does not watch TV or use his phone when he wakes up at night.    His iron supplement was discontinued due to  "diarrhea. He reports no dizziness, chest pain, or palpitations, but does experience occasional headaches. He practices deep breathing during therapy and reports no allergy symptoms. His bowel movements are regular, occurring every other day, and he reports no blood in his stool. He has an upcoming appointment with a nephrologist on 04/04/2025 and a neurologist on 04/22/2025.    His creatinine kinase levels were very elevated during his hospital stay, thought to be due to the prolonged seizure. He experienced soreness post-seizure and was eager to be discharged due to depression.    MEDICATIONS  Current: reconciled       Objective   Vital Signs:  /68   Pulse 72   Temp 97 °F (36.1 °C) (Infrared)   Resp 14   Ht 172.7 cm (68\")   Wt 89.4 kg (197 lb)   SpO2 98%   BMI 29.95 kg/m²   Estimated body mass index is 29.95 kg/m² as calculated from the following:    Height as of this encounter: 172.7 cm (68\").    Weight as of this encounter: 89.4 kg (197 lb).               Physical Exam  Vitals and nursing note reviewed.   Constitutional:       General: He is not in acute distress.     Appearance: He is well-developed. He is not ill-appearing or diaphoretic.   HENT:      Head: Normocephalic and atraumatic.      Right Ear: Tympanic membrane, ear canal and external ear normal.      Left Ear: Tympanic membrane, ear canal and external ear normal.      Mouth/Throat:      Mouth: Mucous membranes are moist.      Pharynx: No posterior oropharyngeal erythema.   Eyes:      General: No scleral icterus.        Right eye: No discharge.         Left eye: No discharge.      Conjunctiva/sclera: Conjunctivae normal.   Cardiovascular:      Rate and Rhythm: Normal rate and regular rhythm.   Pulmonary:      Effort: Pulmonary effort is normal.      Breath sounds: Normal breath sounds.   Abdominal:      General: Bowel sounds are normal. There is no distension.      Palpations: Abdomen is soft.      Tenderness: There is no abdominal " tenderness.   Musculoskeletal:      Cervical back: Neck supple.      Comments: Wheel chair bound, non ambulatory, brace left hand post stroke  Trace bilateral pedal edema   Lymphadenopathy:      Cervical: No cervical adenopathy.   Skin:     General: Skin is warm and dry.   Neurological:      Mental Status: He is alert and oriented to person, place, and time. Mental status is at baseline.   Psychiatric:         Mood and Affect: Mood normal.         Behavior: Behavior normal.      Comments: Short term memory loss just prior to seizure and initial hospitalization          Physical Exam      Vital Signs  Blood pressure is normal.     Result Review :            Results  Laboratory Studies  Creatinine kinase was elevated at 3000. Iron levels were improving but still slightly anemic.                Assessment and Plan     Diagnoses and all orders for this visit:    1. Hospital discharge follow-up (Primary)    2. Seizures  -     levETIRAcetam (KEPPRA) 500 MG tablet; Take 1 tablet by mouth 2 (Two) Times a Day.  Dispense: 60 tablet; Refill: 0    3. Psychophysiological insomnia  -     traZODone (DESYREL) 50 MG tablet; Take 1-2 tablets by mouth Every Night.  Dispense: 180 tablet; Refill: 1    4. Primary hypertension  -     amLODIPine (NORVASC) 5 MG tablet; Take 1 tablet by mouth Every Night.  Dispense: 90 tablet; Refill: 1  -     carvedilol (COREG) 12.5 MG tablet; Take 1 tablet by mouth 2 (Two) Times a Day With Meals.  Dispense: 180 tablet; Refill: 1  -     spironolactone (ALDACTONE) 25 MG tablet; Take 1 tablet by mouth Daily.  Dispense: 90 tablet; Refill: 1  -     doxazosin (CARDURA) 8 MG tablet; Take 1 tablet by mouth 2 (Two) Times a Day.  Dispense: 180 tablet; Refill: 1    5. Wheelchair dependence    Other orders  -     vitamin C (ASCORBIC ACID) 250 MG tablet; Take 1 tablet by mouth Daily. Take with iron  Dispense: 90 tablet; Refill: 1        Assessment & Plan  1. Seizure disorder.   He has been prescribed Keppra and is  currently on a dose of 500 mg BID and been seizure free since hospitalization. A 30-day refill of Keppra will be provided until he sees the neurologist on 04/22/2025. If the neurology appointment is canceled or delayed, he should inform us to continue the prescription until the appointment occurs.    2. Sleep disturbances.  He is experiencing sleep disturbances, likely exacerbated by changes in medication regimen, baclofen d/c and daytime napping. Gabapentin 100 mg will be moved to the evening to see if it helps with sleep. He is advised to reduce his nap duration to no more than an hour and to avoid turning on the TV or other stimulating activities if he wakes up during the night. He has been less active and his mobility has decreased.  Wheel chair bound, benefits from light weight wheelchair to help with transportation to PT/OT therapies which will resume.  He also needs assistive devices for home to help with ADLs, bathing, transferring.     3. Elevated creatinine kinase.  His creatinine kinase levels remain elevated, likely due to the recent seizure. He is advised to maintain adequate hydration, aiming for half his body weight in ounces daily. If he experiences chest congestion, Mucinex may be used as needed.    4. Anemia.  His iron levels have shown improvement but remain slightly low. He is advised to continue taking vitamin C supplements and to incorporate more iron-rich foods into his diet. The iron pill was discontinued due to causing diarrhea.    Follow-up  The patient will follow up in 6 months or sooner if necessary.            Follow Up     No follow-ups on file.  Patient was given instructions and counseling regarding his condition or for health maintenance advice. Please see specific information pulled into the AVS if appropriate.    Patient or patient representative verbalized consent for the use of Ambient Listening during the visit with  MINESH Gupta for chart documentation. 4/7/2025   06:41 EDT

## 2025-03-28 ENCOUNTER — TELEPHONE (OUTPATIENT)
Dept: FAMILY MEDICINE CLINIC | Facility: CLINIC | Age: 40
End: 2025-03-28

## 2025-03-28 NOTE — TELEPHONE ENCOUNTER
Caller: JEFF FARRELL    Relationship: Mother    Best call back number: [phone]     What orders are you requesting (i.e. lab or imaging): WHEELCHAIR, MATTRESS FOR BED      Additional notes: PLEASE SEND TO [facility] [employer] [place] [address]  PHONE NUMBER [phone]

## 2025-04-01 NOTE — TELEPHONE ENCOUNTER
Caller: JEFF FARRELL    Relationship: Mother    Best call back number: [phone]     What was the call regarding: JEFF IS CALLING TO GET AN UPDATE ON THIS ORDER

## 2025-04-02 NOTE — TELEPHONE ENCOUNTER
Spoke to pt mother letting her know I am waiting for quinn to sign pt ov notes and will fax over paperwork. Advise pt mother I will let her know once paperwork is faxed

## 2025-04-08 ENCOUNTER — READMISSION MANAGEMENT (OUTPATIENT)
Dept: CALL CENTER | Facility: HOSPITAL | Age: 40
End: 2025-04-08
Payer: COMMERCIAL

## 2025-04-08 NOTE — OUTREACH NOTE
Stroke Week 3 Survey      Flowsheet Row Responses   [facility] [place] facility patient discharged from? [place]   Does the patient have one of the following disease processes/diagnoses(primary or secondary)? Stroke   Week 3 attempt successful? No   Unsuccessful attempts Attempt 1            Lakeshia MARTÍNEZ - Registered Nurse

## 2025-04-10 ENCOUNTER — TELEPHONE (OUTPATIENT)
Dept: FAMILY MEDICINE CLINIC | Facility: CLINIC | Age: 40
End: 2025-04-10
Payer: COMMERCIAL

## 2025-04-10 NOTE — TELEPHONE ENCOUNTER
Patient mom called in through hub wanting to follow up on Martine's paperwork for a wheelchair and mattress. Spoke with MA and stated will be faxing today. Patient mother understood.

## 2025-04-14 ENCOUNTER — READMISSION MANAGEMENT (OUTPATIENT)
Dept: CALL CENTER | Facility: HOSPITAL | Age: 40
End: 2025-04-14
Payer: COMMERCIAL

## 2025-04-14 NOTE — OUTREACH NOTE
Stroke Week 3 Survey      Flowsheet Row Responses   [facility] patient discharged from? [place]   Does the patient have one of the following disease processes/diagnoses(primary or secondary)? Stroke   Week 3 attempt successful? No   Unsuccessful attempts Attempt 2   Revoke Chapito Antunez H - Registered Nurse

## 2025-04-22 ENCOUNTER — PATIENT ROUNDING (BHMG ONLY) (OUTPATIENT)
Dept: NEUROLOGY | Facility: CLINIC | Age: 40
End: 2025-04-22
Payer: COMMERCIAL

## 2025-04-22 ENCOUNTER — OFFICE VISIT (OUTPATIENT)
Dept: NEUROLOGY | Facility: CLINIC | Age: 40
End: 2025-04-22
Payer: COMMERCIAL

## 2025-04-22 VITALS
HEIGHT: 68 IN | DIASTOLIC BLOOD PRESSURE: 84 MMHG | SYSTOLIC BLOOD PRESSURE: 132 MMHG | HEART RATE: 78 BPM | OXYGEN SATURATION: 98 % | BODY MASS INDEX: 29.96 KG/M2

## 2025-04-22 DIAGNOSIS — G40.109 FOCAL EPILEPSY: Primary | ICD-10-CM

## 2025-04-22 RX ORDER — MULTIVITAMIN WITH IRON
100 TABLET ORAL DAILY
Qty: 30 TABLET | Refills: 1 | Status: SHIPPED | OUTPATIENT
Start: 2025-04-22

## 2025-04-22 RX ORDER — LEVETIRACETAM 500 MG/1
500 TABLET ORAL 2 TIMES DAILY
Qty: 60 TABLET | Refills: 1 | Status: SHIPPED | OUTPATIENT
Start: 2025-04-22

## 2025-04-22 NOTE — PATIENT INSTRUCTIONS
In general, we recommend using good judgement when you are doing certain activities and to avoid those activities that if you were to have a seizure, you could harm yourself or others. In the state of [place], it is the law that you cannot drive within 90 days of a seizure. We also recommend not standing over open flames, not getting on high ladders or the roof, not swimming or taking baths by yourself (showers are ok) and not operating heavy machinery or power tools.  [facility]  Rosa Rocha MD  Neurology clinic  [phone]    With anti-seizure medications, you may initially notice side effects of fatigue, drowsiness, unsteadiness, and dizziness.  Other possible side effects include nausea, abdominal pain, headache, blurry or double vision, slurred speech and mood changes.  Generally, patients will noticed these symptoms when the medication is first started or with higher doses and will go away with time.    It is import to consistently take your medication every day.  Missing just one dose may put you at risk for a breakthrough seizure.  Consider using reminders on your phone or a pill box.    If you develop a rash, please call the neurology clinic immediately or notify another healthcare professional, as this may be potentially life-threatening.  If you are unable to reach a healthcare professional, go to the emergency room immediately for further evaluation.    If you develop thoughts of wanting to hurt yourself or others, please call the neurology clinic immediately to notify another healthcare professional.  If you are unable to reach a healthcare professional, go to the emergency room immediately for further evaluation.    It is the [place] state law that you cannot drive within 90 days of a seizure.    You should avoid certain activities that if you were to have a seizure, you could harm yourself or others. In general, it is recommended that you avoid operating heavy machinery or  power tools, swimming or taking baths by yourself (showers are ok), don't stand over open flames, don't get on high ladders or the roof.  I also recommend to avoid sleeping on your stomach.    For further information on epilepsy and resources available to patients and their families, please visit the [facility] at [email or web address] or call [phone].    **Check out the [facility]'s monthly Art Group Gathering.  [facility] [address]  Call Rajni Tenorio at [phone] or email her at [email or web address] for the dates of future gatherings.**      **If you have having memory problems, consider HOB[facility]OTCH (Home-Based Self-management and Cognitive Training Changes lives).  It is an 8 week self-management program for adults with epilepsy and memory problems.  The program is free at the [facility].  Contact Iqra Dove at [phone] or [email or web address].**

## 2025-04-22 NOTE — PROGRESS NOTES
Chief Complaint  Seizures (Here with mom dad- on keppra- reports he hasn't noticed any change in attitude. Parents report he has gotten - more quiet. Here for sz f/u - mom reports fluid on his head that was to be followed up on-)    Subjective          John Cooper Jr. presents to [facility] NEUROLOGY for   HISTORY OF PRESENT ILLNESS:    John Cooper Jr is a 40 year old right handed man who presents to neurology clinic with his father, John Quevedo, and mother, Lakeshia, for initial evaluation and hospital follow up for seizures.  Of note he was evaluated by my colleagues at [facility] on 3/9/2025 for new onset seizure.  On 3/9/2025 they were having breakfast and and his mother and father witnessed patient having seizure activity.  Patient was very stiff and gurgling from the mouth and his head was turned to the right side (mom thinks).  He was tight and convulsing and lasted for about 12 minutes in duration and he was confused and does not have memory of the seizure till the next day.  He has a history of intracerebral hemorrhage on 11/15/2023 for which he was seen previously for this at [facility] where he underwent craniectomy.  His head CT scan from reports from [facility] demonstrated large right basal ganglia acute hemorrhage with mass effect. He recovered with residual left sided weakness and subsequently had a cranioplasty.  He apparently had some seizure-like spells during his initial hospitalization and they tell me he required sedation for further assistance.  They had not noticed any further seizure like activity until more recently.  He sees Zack for management of spasticity with Botox and he is also seeing neuropsychologist as well.  He was on baclofen when he first went to the hospital and this was discontinued.  He lives at home with his parents who assist him with mobility and toileting.  He is doing better with his speech and able to get his speech out pretty well   significant hemiparesis.  He had a head CT scan and a brain MRI scan at [facility] which did not demonstrate any acute intracranial abnormalities. I did review the most recent brain MRI scan independently with them today.  He had an EEG at [facility] which I read as abnormal due to the presence of mild to moderate diffuse background slowing with more focal right hemispheric slowing and interictal discharges.  The results of this study may indicate mild to moderate encephalopathy, medication effect, excessive drowsiness or bi-hemispheric dysfunction.  The focal right hemispheric slowing is indicative of underlying structural or functional abnormality and the right parietal-temporal discharges place patient at increased risk for focal and secondarily generalized seizures.  He was started on levetiracetam 500 mg BID which he has been taking since being discharged from the hospital and he has not had any further seizures that they are aware of but he is having some mood issues but patient does not feel this is related to the levetiracetam necessarily.  He has been evaluated for FAUSTINO and was started on CPAP.      Past Medical History:   Diagnosis Date    Allergic     Hypertension 6/01/2023    Stroke         Family History   Problem Relation Age of Onset    Migraines Mother     Stroke Father     Hypertension Father     Diabetes Father     Bone cancer Brother     Colon cancer Maternal Grandmother         Social History     Socioeconomic History    Marital status: Single   Tobacco Use    Smoking status: Never     Passive exposure: Never    Smokeless tobacco: Never    Tobacco comments:     Social cigar smoker   Vaping Use    Vaping status: Never Used   Substance and Sexual Activity    Alcohol use: Yes     Comment: social    Drug use: Not Currently    Sexual activity: Defer        I have reviewed and confirmed the accuracy of the ROS as documented by the MA/NEELA/RN Rosa Rocha MD   Review of Systems   Constitutional:   "Positive for appetite change (when starting new med), chills and fatigue. Negative for activity change.   HENT:  Positive for ear pain, sore throat and voice change.    Eyes:  Negative for blurred vision and double vision.   Respiratory:  Negative for cough, shortness of breath, wheezing and stridor.    Cardiovascular:  Negative for chest pain and leg swelling.   Gastrointestinal:  Negative for nausea and vomiting.   Endocrine: Positive for cold intolerance.   Musculoskeletal:  Positive for back pain and gait problem (doesnt walk at home- PT walks him at therapy). Negative for bursitis.   Neurological:  Positive for seizures, speech difficulty and weakness. Negative for dizziness, tremors, syncope, facial asymmetry, light-headedness, numbness, headache, memory problem and confusion.   Psychiatric/Behavioral:  Positive for agitation, sleep disturbance and depressed mood. Negative for behavioral problems, decreased concentration, dysphoric mood, hallucinations, self-injury, suicidal ideas, negative for hyperactivity and stress. The patient is not nervous/anxious.         Objective   Vital Signs:   /84   Pulse 78   Ht 172.7 cm (67.99\")   SpO2 98%   BMI 29.96 kg/m²       PHYSICAL EXAM:    General   Mental Status - Alert. General Appearance - Well developed, Well groomed, Oriented and Cooperative. Orientation - Oriented X3.       Head and Neck  Head - normocephalic, atraumatic with no lesions or palpable masses.  Neck    Global Assessment - supple.       Eye   Sclera/Conjunctiva - Bilateral - Normal.    ENMT  Mouth and Throat   Oral Cavity/Oropharynx: Oropharynx - the soft palate,uvula and tongue are normal in appearance.    Chest and Lung Exam   Chest - lung clear to auscultation bilaterally.    Cardiovascular   Cardiovascular examination reveals  - normal heart sounds, regular rate and rhythm.    Neurologic   Mental Status: Speech - Slow. Cognitive function - able to provide some history.  Cranial Nerves: "   II Optic: Visual acuity - Left - Normal. Right - Normal. Visual fields - Normal (to confrontation).  III Oculomotor: Pupillary constriction - Left - Normal. Right - Normal.  VII Facial: - Mildly weaker on the left side with smile.   IX Glossopharyngeal / X Vagus - Normal.  XI Accessory: Trapezius - Bilateral - Normal. Sternocleidomastoid - Bilateral - Normal.  XII Hypoglossal - Bilateral - Normal.  Eye Movements: - Normal Bilaterally.  Sensory:   Light Touch: Intact - Globally.  Motor:   Bulk and Contour: - Normal.  Tone: - Spastic on left side.   Tremor: Not present.  Strength: 5/5 normal muscle strength on right side and 3/5 strength in left upper extremity and left lower extremity.   General Assessment of Reflexes: - deep tendon reflexes are increased on the left side. Coordination - No Impairment of finger-to-nose or Impairment of rapid alternating movements other than on the left side due to weakness. Gait - Sitting in wheelchair.       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  Of note he was evaluated by my colleagues at [facility] on 3/9/2025 for new onset seizure.  On 3/9/2025 they were having breakfast and and his mother and father witnessed patient having seizure activity.  Patient was very stiff and gurgling from the mouth and his head was turned to the right side (mom thinks).  He was tight and convulsing and lasted for about 12 minutes in duration and he was confused and does not have memory of the seizure till the next day.  He has a history of intracerebral hemorrhage on 11/15/2023 for which he was seen previously for this at [facility] where he underwent craniectomy.  His head CT scan from reports from of demonstrated large right basal ganglia acute hemorrhage with  mass effect. He recovered with residual left sided weakness and subsequently had a cranioplasty.  He apparently had some seizure-like spells during his initial hospitalization and they tell me he required sedation for further assistance.  They had not noticed any further seizure like activity until more recently.  He sees Zack for management of spasticity with Botox and he is also seeing neuropsychologist as well.  He was on baclofen when he first went to the hospital and this was discontinued.  He lives at home with his parents who assist him with mobility and toileting.  He is doing better with his speech and able to get his speech out pretty well  significant hemiparesis.  He had a head CT scan and a brain MRI scan at [facility] which did not demonstrate any acute intracranial abnormalities. I did review the most recent brain MRI scan independently with them today.  He had an EEG at [facility] which I read as abnormal due to the presence of mild to moderate diffuse background slowing with more focal right hemispheric slowing and interictal discharges.  The results of this study may indicate mild to moderate encephalopathy, medication effect, excessive drowsiness or bi-hemispheric dysfunction.  The focal right hemispheric slowing is indicative of underlying structural or functional abnormality and the right parietal-temporal discharges place patient at increased risk for focal and secondarily generalized seizures.  He was started on levetiracetam 500 mg BID which he has been taking since being discharged from the hospital and he has not had any further seizures that they are aware of but he is having some mood issues but patient does not feel this is related to the levetiracetam necessarily.  He has been evaluated for FAUSTINO and was started on CPAP.  I will continue the levetiracetam which he wants to continue at this time and will start him on Vit B6 as well for further assistance with mood issues.  I will  check a trough levetiracetam level and if needed discussed potentially increasing the dose to be sure he is therapeutic.  Advised him to take the medication as prescribed without missing any doses as epileptic seizures can be potentially fatal.  Discussed seizure precautions.  I will provide them with an acute rescue medication Nayzilam to use in case of seizure lasting more than 2 to 3 minutes in duration.  Provided patient education information on seizures today and answered all of their questions.  I informed them that he can use melatonin to help him sleep.          Relevant Medications    levETIRAcetam (KEPPRA) 500 MG tablet    vitamin B-6 (PYRIDOXINE) 100 MG tablet    Other Relevant Orders    Levetiracetam Level (Keppra)       I spent 61 minutes caring for John on this date of service. This time includes time spent by me in the following activities:preparing for the visit, reviewing tests, obtaining and/or reviewing a separately obtained history, performing a medically appropriate examination and/or evaluation , counseling and educating the patient/family/caregiver, ordering medications, tests, or procedures, documenting information in the medical record, independently interpreting results and communicating that information with the patient/family/caregiver, and care coordination    Follow Up   Return in about 2 months (around 6/22/2025).  Patient was given instructions and counseling regarding his condition or for health maintenance advice. Please see specific information pulled into the AVS if appropriate.

## 2025-04-22 NOTE — ASSESSMENT & PLAN NOTE
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  Of note he was evaluated by my colleagues at [facility] on 3/9/2025 for new onset seizure.  On 3/9/2025 they were having breakfast and and his mother and father witnessed patient having seizure activity.  Patient was very stiff and gurgling from the mouth and his head was turned to the right side (mom thinks).  He was tight and convulsing and lasted for about 12 minutes in duration and he was confused and does not have memory of the seizure till the next day.  He has a history of intracerebral hemorrhage on 11/15/2023 for which he was seen previously for this at [facility] where he underwent craniectomy.  His head CT scan from reports from [facility] demonstrated large right basal ganglia acute hemorrhage with mass effect. He recovered with residual left sided weakness and subsequently had a cranioplasty.  He apparently had some seizure-like spells during his initial hospitalization and they tell me he required sedation for further assistance.  They had not noticed any further seizure like activity until more recently.  He sees Zack for management of spasticity with Botox and he is also seeing neuropsychologist as well.  He was on baclofen when he first went to the hospital and this was discontinued.  He lives at home with his parents who assist him with mobility and toileting.  He is doing better with his speech and able to get his speech out pretty well  significant hemiparesis.  He had a head CT scan and a brain MRI scan at [facility] which did not demonstrate any acute intracranial abnormalities. I did review the most recent brain MRI scan independently with them today.  He had an EEG at [facility] which I read as abnormal due to the presence of mild to moderate diffuse background slowing with more focal right hemispheric slowing and interictal discharges.  The results of this study may  indicate mild to moderate encephalopathy, medication effect, excessive drowsiness or bi-hemispheric dysfunction.  The focal right hemispheric slowing is indicative of underlying structural or functional abnormality and the right parietal-temporal discharges place patient at increased risk for focal and secondarily generalized seizures.  He was started on levetiracetam 500 mg BID which he has been taking since being discharged from the hospital and he has not had any further seizures that they are aware of but he is having some mood issues but patient does not feel this is related to the levetiracetam necessarily.  He has been evaluated for FAUSTINO and was started on CPAP.  I will continue the levetiracetam which he wants to continue at this time and will start him on Vit B6 as well for further assistance with mood issues.  I will check a trough levetiracetam level and if needed discussed potentially increasing the dose to be sure he is therapeutic.  Advised him to take the medication as prescribed without missing any doses as epileptic seizures can be potentially fatal.  Discussed seizure precautions.  I will provide them with an acute rescue medication Nayzilam to use in case of seizure lasting more than 2 to 3 minutes in duration.  Provided patient education information on seizures today and answered all of their questions.  I informed them that he can use melatonin to help him sleep.

## 2025-04-28 ENCOUNTER — TELEPHONE (OUTPATIENT)
Dept: FAMILY MEDICINE CLINIC | Facility: CLINIC | Age: 40
End: 2025-04-28

## 2025-04-28 NOTE — TELEPHONE ENCOUNTER
Caller: JEFF FARRELL    Relationship: Mother    Best call back number: [phone]     What form or medical record are you requesting: MEDICAL STATEMENTS OF PATIENTS HEALTH CONDITION    Who is requesting this form or medical record from you: MOTHER    How would you like to receive the form or medical records (pick-up, mail, fax):     MOTHER WILL  STATEMENTS WHEN IT IS READY    Timeframe paperwork needed: ASAP    Additional notes:     MOTHER NEEDS THIS INFORMATION FOR INSURANCE PURPOSES.    MOTHER WOULD LIKE A CALL BACK WITH DOCUMENTS ARE READY    ANY QUESTIONS OR CONCERNS PLEASE CALL JEFF.

## 2025-04-30 ENCOUNTER — OFFICE VISIT (OUTPATIENT)
Dept: CARDIOLOGY | Age: 40
End: 2025-04-30
Payer: COMMERCIAL

## 2025-04-30 ENCOUNTER — DOCUMENTATION (OUTPATIENT)
Dept: FAMILY MEDICINE CLINIC | Facility: CLINIC | Age: 40
End: 2025-04-30
Payer: COMMERCIAL

## 2025-04-30 VITALS
OXYGEN SATURATION: 99 % | WEIGHT: 189 LBS | BODY MASS INDEX: 28.74 KG/M2 | HEART RATE: 63 BPM | DIASTOLIC BLOOD PRESSURE: 80 MMHG | SYSTOLIC BLOOD PRESSURE: 120 MMHG

## 2025-04-30 DIAGNOSIS — E78.2 MIXED HYPERLIPIDEMIA: ICD-10-CM

## 2025-04-30 DIAGNOSIS — I10 PRIMARY HYPERTENSION: ICD-10-CM

## 2025-04-30 DIAGNOSIS — R94.31 ABNORMAL ECG: ICD-10-CM

## 2025-04-30 DIAGNOSIS — I42.8 NICM (NONISCHEMIC CARDIOMYOPATHY): Primary | ICD-10-CM

## 2025-04-30 PROCEDURE — 99214 OFFICE O/P EST MOD 30 MIN: CPT | Performed by: FAMILY MEDICINE

## 2025-04-30 PROCEDURE — 93000 ELECTROCARDIOGRAM COMPLETE: CPT | Performed by: FAMILY MEDICINE

## 2025-04-30 NOTE — PROGRESS NOTES
Date of Office Visit: 2025  Encounter Provider: MINESH Shields  Place of Service: [facility] CARDIOLOGY  Established cardiologist: Aleida Ga MD  Patient Name: John Cooper Jr.  :1985      Patient ID:  John Cooper Jr. is a 40 y.o. male is here for  followup    With a pertinent medical history of hypertension, nonischemic cardiomyopathy, intracranial hemorrhage 2023 with left-sided hemiaplasia, hyperlipidemia     There is a family history of hypertension, cancer and nonischemic cardiomyopathy. 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     History of Present Illness  Echocardiogram 2023 with an ejection fraction 37%, moderate LV dilation, moderate concentric LVH, normal RVSP.     Stress nuclear study 2023 showed ejection fraction of 34% with LV dilation and moderate hypokinesis, no evidence of ischemia.     In 2023 he presented with an unsteady gait and developed left-sided weakness and jumbled speech.  He was brought to the emergency department and had left hemiplegia with spasticity and a right basal ganglia intracranial hemorrhage requiring decompression evacuation 2023.  He required tracheostomy and a PEG tube placement.  He developed a severe tongue ulcer.  He underwent elective right cranioplasty 2024.  Following this he has had impaired mobility and impaired ADLs.  He has been living with his mother and father.  He follows with Dr. Figueroa at [facility] physical medicine.     Echocardiogram done at [facility] 2023 showed an ejection fraction of 30 to 35%, moderate LVH, moderate global hypokinesis of the left ventricle.  Trace mitral regurgitation.  Trace tricuspid regurgitation.  Trace pulmonic regurgitation.     He has sleep apnea and is being followed by sleep  medicine      He saw Dr. Ga in the office 11/19/2024.  Amlodipine was decreased to 5 mg nightly and Entresto 24/26 mg was added twice daily.  He had vocal sounds present during this exam and it was recommended he follow with ENT for questionable stenosis.      He completed an echocardiogram today prior to our appointment 1/28/2025 and this shows recovered ejection fraction 57.3%.  Normal LV cavity size, mild concentric left ventricular hypertrophy, normal diastolic function.  Calcified aortic valve.  Mild MAC and mild anterior mitral leaflet thickening.  Trace tricuspid regurgitation.  Normal RVSP.    I saw Kenneth 1/28/2025.  He was undergoing physical therapy that was intensive and doing well with this.  He had seen ENT who felt there was some vocal cord weakness and had recommended speech therapy.  His Entresto was very expensive but he was planning to have new insurance coverage in the fall.  Samples and co-pay card were provided as well as a referral to pharmacy.    3/9 through 3/14/2025 he was admitted with a chief complaint of seizure activity.  He lives with his parents he got up in the morning to talk to them his mother was preparing breakfast and she heard noises coming from his room went to go check on him and he was having a seizure.  His right side of the body was banging against the rail of his bed.  His left side was very stiff.  He was unresponsive and foaming at the mouth.  His mother said the seizure lasted for very long time.  When paramedics arrived he was postictal and required bag ventilation assistance.  Upon arrival he did not recall any of the episode.His lactic acid was 7.3.  CK was 806.  CBC showed normocytic anemia.  BMP showed creatinine 1.65.  Repeat CK was significantly elevated the following day at 8797.  MRI done showed no acute infarct, hemorrhage, or hydrocephalus.  CT head showed no acute intracranial hemorrhage or mass or mass effect.  Right frontal temporal and right basal  ganglia encephalomalacia from old infarct likely postop changes from right craniotomy and craniectomy with a 4 cm area of CSF attenuation overlying pterional craniectomy consistent with pseudomeningocele.  EEG was done and it was abnormal with mild to moderate diffuse background slowing with more focal right hemispheric slowing and interictal discharges.  May indicate mild to moderate encephalopathy medication effect excessive drowsiness or bihemispheric dysfunction.  Focal right hemispheric slowing indicative of underlying structural or functional abnormality of the right parietal temporal discharges which places him at increased risk for focal and secondarily generalized seizures.  No seizure was recorded during the EEG.He was dismissed on Keppra and following with neurosurgery and neurology.     Today John is here with his parents. He is doing well with physical therapy and will be starting speech therapy soon. He has not had any recurrent seizure activity.he has remained on Keppra.  He is following with neurology.  There is no chest pain or pressure, soa, canada, pnd, lightheadedness, dizziness, presyncope/syncope, leg swelling, heart racing, or palpitations.  They are now able to get Entresto at an affordable price which is great.   3/14/2025 creatinine 1.33 otherwise unremarkable renal function panel.     Current Outpatient Medications on File Prior to Visit   Medication Sig Dispense Refill    acetaminophen (TYLENOL) 325 MG tablet Take 2 tablets by mouth Every 6 (Six) Hours As Needed for Mild Pain.      amLODIPine (NORVASC) 5 MG tablet Take 1 tablet by mouth Every Night. 90 tablet 1    carvedilol (COREG) 12.5 MG tablet Take 1 tablet by mouth 2 (Two) Times a Day With Meals. 180 tablet 1    doxazosin (CARDURA) 8 MG tablet Take 1 tablet by mouth 2 (Two) Times a Day. 180 tablet 1    DULoxetine HCl 40 MG capsule delayed-release particles Take 1 capsule by mouth Daily.      gabapentin (NEURONTIN) 100 MG capsule Take 1  capsule by mouth 3 (Three) Times a Day. Pt reports taking once daily      levETIRAcetam (KEPPRA) 500 MG tablet Take 1 tablet by mouth 2 (Two) Times a Day. 60 tablet 1    sacubitril-valsartan (Entresto) 24-26 MG tablet Take 1 tablet by mouth 2 (Two) Times a Day. 180 tablet 3    spironolactone (ALDACTONE) 25 MG tablet Take 1 tablet by mouth Daily. 90 tablet 1    tiZANidine (ZANAFLEX) 4 MG tablet Take 1 tablet by mouth 3 (Three) Times a Day. 180 tablet 1    traZODone (DESYREL) 50 MG tablet Take 1-2 tablets by mouth Every Night. 180 tablet 1    vitamin B-6 (PYRIDOXINE) 100 MG tablet Take 1 tablet by mouth Daily. 30 tablet 1    vitamin C (ASCORBIC ACID) 250 MG tablet Take 1 tablet by mouth Daily. Take with iron 90 tablet 1     No current facility-administered medications on file prior to visit.         Procedures    ECG 12 Lead    Date/Time: 4/30/2025 2:08 PM  Performed by: Isabel Mendoza APRN    Authorized by: Isabel Mendoza APRN  Comparison: compared with previous ECG from 1/28/2025  Rhythm: sinus rhythm  BPM: 63              Objective:      Vitals:    04/30/25 1129   BP: 120/80   Pulse: 63   SpO2: 99%   Weight: 85.7 kg (189 lb)     Body mass index is 28.74 kg/m².  Wt Readings from Last 3 Encounters:   04/30/25 85.7 kg (189 lb)   03/27/25 89.4 kg (197 lb)   03/13/25 89.8 kg (197 lb 15.9 oz)     Constitutional:       Comments: Kenneth is a 40-year-old -American male who is well-appearing, in a wheelchair today, he with evident left sided residual weakness, he is in no acute distress   Eyes:      Pupils: Pupils are equal, round, and reactive to light.   Pulmonary:      Effort: Pulmonary effort is normal.      Breath sounds: Normal breath sounds.   Cardiovascular:      Normal rate. Regular rhythm.      Murmurs: There is no murmur.   Pulses:     Radial: 2+ bilaterally.     Dorsalis pedis: 2+ bilaterally.     Posterior tibial: 2+ bilaterally.  Edema:     Peripheral edema absent.   Abdominal:      Palpations:  Abdomen is soft.   Musculoskeletal:      Cervical back: Normal range of motion. Skin:     General: Skin is warm and dry.   Neurological:      Mental Status: Alert and oriented to person, place and time.        Assessment:     1. NICM (nonischemic cardiomyopathy)    2. Primary hypertension    3. Mixed hyperlipidemia    4. Abnormal ECG      NICM, due to hypertension.  He now has recovered EF on TTE 1/28/2025.  Maintain his present medication regimen.  Hypertension is controlled.  Hyperlipidemia, lipid panel is pending.   Abnormal ECG, appears unchanged from study August 2023.   Hemorrhagic stroke November 2023, status postcraniotomy with mild left-sided hemiaplasia.  He is undergoing frequent physical therapy and receiving Botox for spasticity.  History of tracheostomy saw ENT for vocal cord weakness and will be doing therapy for that  FAUSTINO on CPAP  Family history of cardiomyopathy  Seizure on Keppra hospitalized for this March 2025 following with neurology    Plan:   No medication changes were made  He has remained stable from a cardiac standpoint  He will see Dr. Ga in 6 months      Thank you for allowing me to participate in this patient's care. Please call with any questions or concerns.          Dragon dictation device was utilized in this note.

## 2025-06-05 ENCOUNTER — TELEPHONE (OUTPATIENT)
Dept: FAMILY MEDICINE CLINIC | Facility: CLINIC | Age: 40
End: 2025-06-05

## 2025-06-05 NOTE — TELEPHONE ENCOUNTER
Caller: JEFF FARRELL    Relationship: Mother    Best call back number: [phone]       What was the call regarding: PATIENT IS INTERESTED IN A GLP1.  PLEASE CALL TO DISCUSS

## 2025-06-11 ENCOUNTER — OFFICE VISIT (OUTPATIENT)
Dept: FAMILY MEDICINE CLINIC | Facility: CLINIC | Age: 40
End: 2025-06-11
Payer: COMMERCIAL

## 2025-06-11 VITALS
HEART RATE: 82 BPM | TEMPERATURE: 97.7 F | DIASTOLIC BLOOD PRESSURE: 70 MMHG | BODY MASS INDEX: 36.6 KG/M2 | OXYGEN SATURATION: 98 % | HEIGHT: 68 IN | WEIGHT: 241.5 LBS | SYSTOLIC BLOOD PRESSURE: 132 MMHG

## 2025-06-11 DIAGNOSIS — Z76.89 ENCOUNTER FOR WEIGHT MANAGEMENT: Primary | ICD-10-CM

## 2025-06-11 PROCEDURE — 99213 OFFICE O/P EST LOW 20 MIN: CPT | Performed by: NURSE PRACTITIONER

## 2025-06-11 RX ORDER — ERGOCALCIFEROL 1.25 MG/1
1 CAPSULE ORAL WEEKLY
COMMUNITY
Start: 2025-06-04 | End: 2026-06-04

## 2025-06-25 NOTE — PROGRESS NOTES
"Chief Complaint  Weight Loss    Subjective        John Kenneth Miranda presents to [facility]  Weight Loss      History of Present Illness  The patient presents for weight management. Here with mom and dad who assist with history    He is seeking advice on the use of weight loss injections, having previously consulted with his nephrologist who expressed no objections. He was advised to discuss this further with his primary care provider. Mom has been utilizing [place] for weight management, as prescribed by her primary care provider, and is currently in her first month of treatment. Not yet started it.     He reports no respiratory symptoms such as cough or shortness of breath, except during physical therapy sessions. He also reports no chest pain or palpitations. His physical therapy is progressing well, and he is able to ambulate with the aid of a cane. He has noticed redness inside his nose, which he attributes to allergies or mask irritation from CPAP.    He reports no gastrointestinal issues and considers his bowel movements, occurring 3 to 4 times per week, to be normal. He maintains a balanced diet, typically consuming two turkey sausage patties, one egg, and one hash brown for breakfast. He does not snack between meals. He has expressed interest in modifying his diet over the next one to two months.    He had his kidney function checked recently and saw the kidney doctor. He gave blood the day before that. He is supposed to do blood work on 06/27/2025 and would like to do it today.         Objective   Vital Signs:  /70 (BP Location: Left arm, Patient Position: Sitting, Cuff Size: Large Adult)   Pulse 82   Temp 97.7 °F (36.5 °C) (Temporal)   Ht 172.7 cm (67.99\")   Wt 110 kg (241 lb 8 oz)   SpO2 98%   BMI 36.73 kg/m²   Estimated body mass index is 36.73 kg/m² as calculated from the following:    Height as of this encounter: 172.7 cm (67.99\").    Weight as of this " encounter: 110 kg (241 lb 8 oz).               Physical Exam  Vitals and nursing note reviewed.   Constitutional:       General: He is not in acute distress.     Appearance: He is well-developed. He is not ill-appearing.   HENT:      Head: Normocephalic and atraumatic.      Right Ear: Tympanic membrane, ear canal and external ear normal.      Left Ear: Tympanic membrane, ear canal and external ear normal.      Nose: Mucosal edema and rhinorrhea present.      Mouth/Throat:      Mouth: Mucous membranes are moist.      Pharynx: Uvula midline.   Eyes:      General: No scleral icterus.        Right eye: No discharge.         Left eye: No discharge.      Conjunctiva/sclera: Conjunctivae normal.      Pupils: Pupils are equal, round, and reactive to light.   Neck:      Thyroid: No thyromegaly.   Cardiovascular:      Rate and Rhythm: Normal rate and regular rhythm.   Pulmonary:      Effort: Pulmonary effort is normal.      Breath sounds: Normal breath sounds.   Abdominal:      General: Bowel sounds are normal.      Palpations: Abdomen is soft.   Musculoskeletal:      Cervical back: Neck supple.      Comments: Wheelchair  Left sided weakness   Lymphadenopathy:      Cervical: No cervical adenopathy.   Skin:     General: Skin is warm and dry.   Neurological:      General: No focal deficit present.      Mental Status: He is alert and oriented to person, place, and time.   Psychiatric:         Mood and Affect: Mood normal.         Behavior: Behavior normal.         Thought Content: Thought content normal.          Physical Exam       Result Review :            Results                  Assessment and Plan     Diagnoses and all orders for this visit:    1. Encounter for weight management (Primary)    2. BMI 36.0-36.9,adult        Assessment & Plan  1. Weight management for obesity BMI 36.73  - His current medication regimen may contribute to weight gain, necessitating careful monitoring.  - The potential side effects of weight loss  medications, including nausea and constipation, were discussed. The mechanism of action of these medications was explained in detail.  - He was advised to maintain adequate hydration and monitor for any signs of kidney issues. The importance of a balanced diet, particularly one rich in protein, was emphasized.  - He was encouraged to engage in physical therapy to strengthen his upper body. The benefits of a ketogenic diet were also discussed. He was advised to avoid high-glycemic foods such as apples and bananas, and instead opt for low-glycemic fruits like strawberries and other berries. He was also advised to limit his intake of potatoes and fries. If necessary, weight loss medication can be prescribed to assist in his efforts.  -After discussion, both pt and family have decided to try diet mods first with higher protein, lower carbs    2. Health maintenance.  - Fasting labs will be conducted in July 2025 to assess his insulin resistance, given his normal A1c levels.  - He was advised to drink plenty of water before the test and avoid food after midnight.  - Discussion included the importance of maintaining muscle mass to boost metabolism and the potential need for metformin if insulin resistance is detected.            I spent 25 minutes caring for John on this date of service. This time includes time spent by me in the following activities:preparing for the visit, reviewing tests, obtaining and/or reviewing a separately obtained history, performing a medically appropriate examination and/or evaluation , counseling and educating the patient/family/caregiver, and documenting information in the medical record  Follow Up     No follow-ups on file.  Patient was given instructions and counseling regarding his condition or for health maintenance advice. Please see specific information pulled into the AVS if appropriate.    Patient or patient representative verbalized consent for the use of Ambient Listening during the  visit with  MINESH Gupta for chart documentation. 6/25/2025  06:11 EDT

## 2025-07-21 RX ORDER — DULOXETINE 40 MG/1
1 CAPSULE, DELAYED RELEASE ORAL DAILY
Qty: 90 CAPSULE | Refills: 1 | OUTPATIENT
Start: 2025-07-21

## 2025-07-21 NOTE — TELEPHONE ENCOUNTER
Rx Refill Note  Requested Prescriptions     Pending Prescriptions Disp Refills    DULoxetine HCl 40 MG capsule delayed-release particles [Pharmacy Med Name: DULoxetine HCl 40 MG Oral Capsule Delayed Release Particles] 90 capsule 1     Sig: Take 1 capsule by mouth once daily      Last office visit with prescribing clinician: 6/11/2025   Last telemedicine visit with prescribing clinician: Visit date not found   Next office visit with prescribing clinician: 9/29/2025                         Would you like a call back once the refill request has been completed: [] Yes [] No    If the office needs to give you a call back, can they leave a voicemail: [] Yes [] No    Geovanny Mckeon Rep  07/21/25, 10:24 EDT

## 2025-07-23 ENCOUNTER — TELEPHONE (OUTPATIENT)
Dept: FAMILY MEDICINE CLINIC | Facility: CLINIC | Age: 40
End: 2025-07-23
Payer: COMMERCIAL

## 2025-08-06 ENCOUNTER — TELEPHONE (OUTPATIENT)
Dept: FAMILY MEDICINE CLINIC | Facility: CLINIC | Age: 40
End: 2025-08-06
Payer: COMMERCIAL

## 2025-08-06 DIAGNOSIS — Z99.3 WHEELCHAIR DEPENDENCE: Primary | ICD-10-CM

## 2025-08-06 DIAGNOSIS — Z86.73 HISTORY OF STROKE: ICD-10-CM

## 2025-08-06 DIAGNOSIS — Z74.09 LIMITED MOBILITY: ICD-10-CM
